# Patient Record
Sex: FEMALE | Race: WHITE | NOT HISPANIC OR LATINO | Employment: FULL TIME | URBAN - METROPOLITAN AREA
[De-identification: names, ages, dates, MRNs, and addresses within clinical notes are randomized per-mention and may not be internally consistent; named-entity substitution may affect disease eponyms.]

---

## 2017-02-13 ENCOUNTER — GYNECOLOGY VISIT (OUTPATIENT)
Dept: OBGYN | Facility: CLINIC | Age: 32
End: 2017-02-13
Payer: OTHER MISCELLANEOUS

## 2017-02-13 DIAGNOSIS — N93.8 DUB (DYSFUNCTIONAL UTERINE BLEEDING): ICD-10-CM

## 2017-02-13 LAB — IN CLINIC OB SCAN: NORMAL

## 2017-02-13 PROCEDURE — 99203 OFFICE O/P NEW LOW 30 MIN: CPT | Mod: 25 | Performed by: OBSTETRICS & GYNECOLOGY

## 2017-02-13 PROCEDURE — 76830 TRANSVAGINAL US NON-OB: CPT | Performed by: OBSTETRICS & GYNECOLOGY

## 2017-02-13 NOTE — MR AVS SNAPSHOT
Murray Chavez   2017 9:00 AM   Gynecology Visit   MRN: [id number]    Department:  [facility]   Dept Phone:  [phone]    Description:  Female : 1985   Provider:  Barbra Ulloa M.D.           Allergies as of 2017     No Known Allergies      You were diagnosed with     DUB (dysfunctional uterine bleeding)   [[id number]]         Vital Signs     Smoking Status                   Never Smoker            Basic Information     Date Of Birth Sex Race Ethnicity Preferred Language    1985 Female White Non- English      Health Maintenance     Patient has no pending health maintenance at this time      Results     POCT US - In Clinic OB Scan                   Current Immunizations     Tuberculin Skin Test 10/29/2013  4:25 PM      Below and/or attached are the medications your provider expects you to take. Review all of your home medications and newly ordered medications with your provider and/or pharmacist. Follow medication instructions as directed by your provider and/or pharmacist. Please keep your medication list with you and share with your provider. Update the information when medications are discontinued, doses are changed, or new medications (including over-the-counter products) are added; and carry medication information at all times in the event of emergency situations     Allergies:  No Known Allergies          Medications  Valid as of: 2017 -  9:44 AM    Generic Name Brand Name Tablet Size Instructions for use    Prenatal MV-Min-Fe Fum-FA-DHA   Take  by mouth.        .                 Medicines prescribed today were sent to:     [facility]/PHARMACY #[id number] - [place] - [address] [address]    Phone: [phone] Fax: [phone]    Open 24 Hours?: No      Medication refill instructions:       If your prescription bottle indicates you have medication refills left, it is not necessary to call your provider’s office.  Please contact your pharmacy and they will refill your medication.    If your prescription bottle indicates you do not have any refills left, you may request refills at any time through one of the following ways: The online [employer] system (except Urgent Care), by calling your provider’s office, or by asking your pharmacy to contact your provider’s office with a refill request. Medication refills are processed only during regular business hours and may not be available until the next business day. Your provider may request additional information or to have a follow-up visit with you prior to refilling your medication.   *Please Note: Medication refills are assigned a new Rx number when refilled electronically. Your pharmacy may indicate that no refills were authorized even though a new prescription for the same medication is available at the pharmacy. Please request the medicine by name with the pharmacy before contacting your provider for a refill.           [employer] Access Code: Activation code not generated  Current [employer] Status: Active

## 2017-02-13 NOTE — PROGRESS NOTES
CC: Confirmation of Pregnancy    HPI: Pt is a 32 yo  lmp 12/3/16 (irregular cycle)  who presents for evaluation of amenorrhea.  She has had no bleeding since her last menses.  A urine pregnancy test was positive.  She denies vaginal spotting or pain.  She notes nausea and vomiting.      ROS: negative for dizziness, SOB, chest pain, palpitations, dysuria, vaginal discharge.    There were no vitals taken for this visit.    GENERAL: Alert, in no apparent distress  PSYCHIATRIC: Appropriate affect, intact insight and judgement.  ABDOMEN: Soft, nontender, nondistended.  No palpable masses. No hepatosplenomegaly.   No rebound or guarding.  No inguinal lymphadenopathy.  BACK: No CVA tenderness  EXTREMITIES: No edema, no calf tenderness.    GENITOURINARY:  Normal external genitalia, no lesions.  Normal urethral meatus, no masses or tenderness.  Normal bladder without fullness or masses.  Vagina well estrogenized, no vaginal discharge or lesions.  Cervix normal length, nontender.  Uterus normal size, shape, and contour, nontender.  Adnexa nontender, no masses.  Normal anus and perineum.      TRANSVAGINAL ULTRASOUND - performed and interpreted by me    Clement intrauterine pregnancy with CRL measuring 38.1 mm, c/w 11 +1/7 weeks EGA, positive fetal cardiac activity noted. EDC 9/3/17     No fluid in cul de sac.    Ovaries and cervix appear grossly normal.  Cervix 3.8 cm.    ASSESSMENT/PLAN:  1. DUB visit - Clement IUP at 11+1/7  wks.  Prenatal Vitamins.  F/U for NOB appointment. EDC  Will be 9/3/17, as LMP irregular.   2. Nausea and vomiting of pregnancy - Declegis samples given.

## 2017-03-07 ENCOUNTER — TELEPHONE (OUTPATIENT)
Dept: OBGYN | Facility: CLINIC | Age: 32
End: 2017-03-07

## 2017-03-07 NOTE — TELEPHONE ENCOUNTER
----- Message from Your Healthcare Team sent at 3/6/2017  7:36 PM PST -----  Regarding: Non-Urgent Medical Question  Contact: [phone]  I have been having some very strong painful cramps all over my stomach. At first it started as left sided pain and pelvis pressure. Its been going on since friday. It is not constant, but it takes my breath away. too early for those supriya hick,  I cannot say I have had these types of pains before. Still having some issues with bloating, and loose stool Nirali just learned to live with. I am not spotting at all, just hoping it my body arranging for the baby. This was unlike either 2 pregnancies.

## 2017-03-07 NOTE — TELEPHONE ENCOUNTER
Patient called back I asked her symptoms today and she is not having any as of today, but I stated if you begin to feel like this again to go to [facility] to be evaluated. Pt states this was her determining day as to if she was going to go to the hospital, but so far so good. i told the pt she may need to be monitored or give IV fluid so please do not wait around to see if she cn get in to the office when going through that much pain. Dr. Ulloa was on call yesterday 3/6/17 but pt did not return the cll until today. Pt verbalized understanding and had no further questions

## 2017-03-17 ENCOUNTER — HOSPITAL ENCOUNTER (OUTPATIENT)
Facility: MEDICAL CENTER | Age: 32
End: 2017-03-17
Attending: OBSTETRICS & GYNECOLOGY
Payer: COMMERCIAL

## 2017-03-17 ENCOUNTER — TELEPHONE (OUTPATIENT)
Dept: OBGYN | Facility: CLINIC | Age: 32
End: 2017-03-17

## 2017-03-17 ENCOUNTER — INITIAL PRENATAL (OUTPATIENT)
Dept: OBGYN | Facility: CLINIC | Age: 32
End: 2017-03-17
Payer: COMMERCIAL

## 2017-03-17 VITALS — SYSTOLIC BLOOD PRESSURE: 122 MMHG | WEIGHT: 223 LBS | DIASTOLIC BLOOD PRESSURE: 82 MMHG

## 2017-03-17 DIAGNOSIS — Z34.82 ENCOUNTER FOR SUPERVISION OF OTHER NORMAL PREGNANCY IN SECOND TRIMESTER: ICD-10-CM

## 2017-03-17 PROCEDURE — 59401 PR NEW OB VISIT: CPT | Performed by: OBSTETRICS & GYNECOLOGY

## 2017-03-17 PROCEDURE — 88175 CYTOPATH C/V AUTO FLUID REDO: CPT

## 2017-03-17 PROCEDURE — 87491 CHLMYD TRACH DNA AMP PROBE: CPT

## 2017-03-17 PROCEDURE — 87591 N.GONORRHOEAE DNA AMP PROB: CPT

## 2017-03-17 NOTE — TELEPHONE ENCOUNTER
"Genevieve called from lab said that the Thinprep Vial was not closed all the way and leaked all over the bag. Going to attempt to run the pap smear but may need possible recollect.   Genevieve faxed over form stating that the pap is ours and it does say \"Murray Chavez\" and has her \"MRN\"    "

## 2017-03-17 NOTE — PROGRESS NOTES
Subjective:      Murray Chavez is a 31 y.o. female who presents with No chief complaint on file.        CC: NOB exam    HPI 32 yo  @ 15+5/7 wks, ARANZA 17 by 11 wk sono presents for NOB exam.  Nausea and vomiting resolved.  Having some migraines - stopped taking Gabapentin and Zoloft.     complications:  1. Migraines  2. Hx of gestational htn    ROS REVIEW OF SYSTEMS:    Pertinent positives and negatives mentioned in HPI.    All other systems reviewed and are negative.     Objective:     /82 mmHg  Wt 101.152 kg (223 lb)     Physical Exam     See prenatal physical.        Assessment/Plan:     1. Encounter for supervision of other normal pregnancy in second trimester  Continue PNV.  Declines CF.   - PRENATAL PANEL 3+HIV+UACXI; Future  - THINPREP RFLX HPV ASCUS W/CTNG; Future  - AFP TETRA; Future  - US-OB 2ND 3RD TRI COMPLETE; Future    F/U 4 wks.

## 2017-03-17 NOTE — MR AVS SNAPSHOT
Murray Chavez   3/17/2017 8:45 AM   Initial Prenatal   MRN: [id number]    Department:  [facility]   Dept Phone:  [phone]    Description:  Female : 1985   Provider:  Barbra Ulloa M.D.           Allergies as of 3/17/2017     No Known Allergies      You were diagnosed with     Encounter for supervision of other normal pregnancy in second trimester   [[id number]]         Vital Signs     Blood Pressure Weight Smoking Status             122/82 mmHg 101.152 kg (223 lb) Never Smoker          Basic Information     Date Of Birth Sex Race Ethnicity Preferred Language    1985 Female White Non- English      Your appointments     May 08, 2017 10:45 AM   OB Follow Up with Barbra Ulloa M.D.   [facility] ([address])    [address] [place] [address], [facility] [address]  [facility] [id number] [phone]            2017  8:30 AM   OB Follow Up with Barbra Ulloa M.D.   [facility] ([address])    [address] [place] [address], [address] [id number] [phone]              Health Maintenance        Date Due Completion Dates    IMM DTaP/Tdap/Td Vaccine (1 - Tdap) 2006 ---    PAP SMEAR 2008 ---    IMM INFLUENZA (1) 2016 ---            Current Immunizations     Tuberculin Skin Test 10/29/2013  4:25 PM      Below and/or attached are the medications your provider expects you to take. Review all of your home medications and newly ordered medications with your provider and/or pharmacist. Follow medication instructions as directed by your provider and/or pharmacist. Please keep your medication list with you and share with your provider. Update the information when medications are discontinued, doses are changed, or new medications (including over-the-counter products) are added; and carry medication information at all times in the event of emergency situations     Allergies:  No Known Allergies          Medications  Valid as of: March  17, 2017 -  9:08 AM    Generic Name Brand Name Tablet Size Instructions for use    Prenatal MV-Min-Fe Fum-FA-DHA   Take  by mouth.        .                 Medicines prescribed today were sent to:     [facility]/PHARMACY #[id number] - [place], [place] - [address] [facility]    [address] [facility] [place] [address]    Phone: [phone] Fax: [phone]    Open 24 Hours?: No      Medication refill instructions:       If your prescription bottle indicates you have medication refills left, it is not necessary to call your provider’s office. Please contact your pharmacy and they will refill your medication.    If your prescription bottle indicates you do not have any refills left, you may request refills at any time through one of the following ways: The online [employer] system (except Urgent Care), by calling your provider’s office, or by asking your pharmacy to contact your provider’s office with a refill request. Medication refills are processed only during regular business hours and may not be available until the next business day. Your provider may request additional information or to have a follow-up visit with you prior to refilling your medication.   *Please Note: Medication refills are assigned a new Rx number when refilled electronically. Your pharmacy may indicate that no refills were authorized even though a new prescription for the same medication is available at the pharmacy. Please request the medicine by name with the pharmacy before contacting your provider for a refill.        Your To Do List     Future Labs/Procedures Complete By Expires    AFP TETRA  As directed 3/17/2018    PRENATAL PANEL 3+HIV+UACXI  As directed 3/17/2018    THINPREP RFLX HPV ASCUS W/CTNG  As directed 3/17/2018    US-OB 2ND 3RD TRI COMPLETE  As directed 3/17/2018         [employer] Access Code: Activation code not generated  Current [employer] Status: Active

## 2017-03-20 DIAGNOSIS — Z34.82 ENCOUNTER FOR SUPERVISION OF OTHER NORMAL PREGNANCY IN SECOND TRIMESTER: ICD-10-CM

## 2017-03-22 LAB
C TRACH DNA GENITAL QL NAA+PROBE: NEGATIVE
CYTOLOGY REG CYTOL: NORMAL
N GONORRHOEA DNA GENITAL QL NAA+PROBE: NEGATIVE
SPECIMEN SOURCE: NORMAL

## 2017-04-03 ENCOUNTER — TELEPHONE (OUTPATIENT)
Dept: OBGYN | Facility: CLINIC | Age: 32
End: 2017-04-03

## 2017-04-05 NOTE — TELEPHONE ENCOUNTER
Consulted with Dr funk he advised pt to go to Urgent care to get evlauated   Pt has been drinking 8-10 cups a day or more of water  BP: 04/04/17 - 120/84  BP: 04/05/17 - 138/78  Heartrate: 04/05/17 - 140    Pt verbalized understanding about going to get evaluated

## 2017-04-06 LAB
BUN SERPL-MCNC: 7 MG/DL
CREAT SERPL-MCNC: 0.6 MG/DL
HBV SURFACE AG SERPL QL IA: NON REACTIVE
HCT VFR BLD AUTO: 36.1 %
HGB BLD-MCNC: 12.5 G/DL
HIV 1 0 2 IC ZHVIC: NON REACTIVE
PLATELET # BLD AUTO: 303 10*3/UL
RUBV IGG SERPL IA-ACNC: NORMAL

## 2017-04-07 ENCOUNTER — HOSPITAL ENCOUNTER (OUTPATIENT)
Dept: RADIOLOGY | Facility: MEDICAL CENTER | Age: 32
End: 2017-04-07
Attending: OBSTETRICS & GYNECOLOGY
Payer: COMMERCIAL

## 2017-04-07 DIAGNOSIS — Z34.82 ENCOUNTER FOR SUPERVISION OF OTHER NORMAL PREGNANCY IN SECOND TRIMESTER: ICD-10-CM

## 2017-04-07 PROCEDURE — 76805 OB US >/= 14 WKS SNGL FETUS: CPT

## 2017-04-09 DIAGNOSIS — Z34.82 ENCOUNTER FOR SUPERVISION OF OTHER NORMAL PREGNANCY IN SECOND TRIMESTER: ICD-10-CM

## 2017-04-10 ENCOUNTER — TELEPHONE (OUTPATIENT)
Dept: OBGYN | Facility: CLINIC | Age: 32
End: 2017-04-10

## 2017-04-10 NOTE — TELEPHONE ENCOUNTER
----- Message from Barbra Ulloa M.D. sent at 4/9/2017  9:15 AM PDT -----  Please call patient and inform her that the US looks good, but they were not able to see everything.  I have ordered a repeat US to be done in 2-3 weeks to complete the fetal survey.

## 2017-04-11 ENCOUNTER — ROUTINE PRENATAL (OUTPATIENT)
Dept: OBGYN | Facility: CLINIC | Age: 32
End: 2017-04-11
Payer: COMMERCIAL

## 2017-04-11 VITALS — DIASTOLIC BLOOD PRESSURE: 60 MMHG | SYSTOLIC BLOOD PRESSURE: 98 MMHG | WEIGHT: 226 LBS

## 2017-04-11 DIAGNOSIS — R00.2 PALPITATIONS: ICD-10-CM

## 2017-04-11 DIAGNOSIS — O13.2 GESTATIONAL HYPERTENSION WITHOUT SIGNIFICANT PROTEINURIA IN SECOND TRIMESTER: ICD-10-CM

## 2017-04-11 PROBLEM — O13.9 GESTATIONAL HYPERTENSION WITHOUT SIGNIFICANT PROTEINURIA: Status: ACTIVE | Noted: 2017-04-11

## 2017-04-11 PROBLEM — G43.909 MIGRAINES: Status: ACTIVE | Noted: 2017-04-11

## 2017-04-11 PROCEDURE — 90040 PR PRENATAL FOLLOW UP: CPT | Performed by: OBSTETRICS & GYNECOLOGY

## 2017-04-11 NOTE — Clinical Note
April 11, 2017       Patient: Murray Chavez   YOB: 1985   Date of Visit: 4/11/2017         To Whom It May Concern:    It is my medical opinion that Murray Chavez must ambulate every 2 hours when traveling.    If you have any questions or concerns, please don't hesitate to call [phone]          Sincerely,          Barbra Ulloa M.D.  Electronically Signed

## 2017-04-11 NOTE — PROGRESS NOTES
31 y.o.  19w2d The patient is here for routine obstetrical followup.She denies contractions, vaginal bleeding, or leaking of fluid.  She c/o intermittent palpitations with pulse in 130s-140s.  Also notes sporadic elevation in BP at work.      She was seen at urgent care and started on propranolol and hctz for /70.  Now BP is 98/60.  I have advised her to stop these medications immediately. Plan to take BP at work daily, and f/u in office for BP check in one week.  If BPs 150s/90s-100s, will consider starting labetalol.      Referal submitted to cardiology for palpitations and intermittent tachycardia.    Fetal survey reviewed - incomplete.  Repeat study ordered 2-3 weeks.  Prenatal labs reviewed.     Declines AFP and CF testing.     Followup in 1 week

## 2017-04-11 NOTE — PROGRESS NOTES
Pt here for OB F/V. Good fetal movement. Denies LOF, VB.  PAP WNL  PNP done at PCP - pt bring in copy today  Decided to not get AFP done  Need US follow up- order slip given  PCP put her on BP meds- brings them with her today

## 2017-04-11 NOTE — MR AVS SNAPSHOT
Murray Chavez   2017 10:15 AM   Routine Prenatal   MRN: [id number]    Department:  [facility]   Dept Phone:  [phone]    Description:  Female : 1985   Provider:  Barbra Ulloa M.D.           Allergies as of 2017     No Known Allergies      Vital Signs     Blood Pressure Weight Smoking Status             98/60 mmHg 102.513 kg (226 lb) Never Smoker          Basic Information     Date Of Birth Sex Race Ethnicity Preferred Language    1985 Female White Non- English      Your appointments     2017  1:15 PM   OB Follow Up with Barbra Ulloa M.D.   [facility] ([address])    [address] [place] [address], [address] [place] [id number] [phone]            2017  8:45 AM   OB Follow Up with Barbra Ulloa M.D.   [facility] ([address])    [address] [place] [address], [address] [place] [phone]            2017  8:45 AM   OB Follow Up with Barbra Ulloa M.D.   [facility] ([address])    [address] [place] [address], [address] [place] [phone]              Health Maintenance        Date Due Completion Dates    IMM DTaP/Tdap/Td Vaccine (1 - Tdap) 2006 ---    PAP SMEAR 3/17/2020 3/17/2017            Current Immunizations     Tuberculin Skin Test 10/29/2013  4:25 PM      Below and/or attached are the medications your provider expects you to take. Review all of your home medications and newly ordered medications with your provider and/or pharmacist. Follow medication instructions as directed by your provider and/or pharmacist. Please keep your medication list with you and share with your provider. Update the information when medications are discontinued, doses are changed, or new medications (including over-the-counter products) are added; and carry medication information at all times in the event of emergency situations     Allergies:  No Known Allergies            Medications  Valid as of: April 11, 2017 - 10:48 AM    Generic Name Brand Name Tablet Size Instructions for use    Prenatal MV-Min-Fe Fum-FA-DHA   Take  by mouth.        .                 Medicines prescribed today were sent to:     [facility]/PHARMACY #[id number] - [place], [place] - [address] [facility]    [address] [facility] [place] [address]    Phone: [phone] Fax: [phone]    Open 24 Hours?: No      Medication refill instructions:       If your prescription bottle indicates you have medication refills left, it is not necessary to call your provider’s office. Please contact your pharmacy and they will refill your medication.    If your prescription bottle indicates you do not have any refills left, you may request refills at any time through one of the following ways: The online [employer] system (except Urgent Care), by calling your provider’s office, or by asking your pharmacy to contact your provider’s office with a refill request. Medication refills are processed only during regular business hours and may not be available until the next business day. Your provider may request additional information or to have a follow-up visit with you prior to refilling your medication.   *Please Note: Medication refills are assigned a new Rx number when refilled electronically. Your pharmacy may indicate that no refills were authorized even though a new prescription for the same medication is available at the pharmacy. Please request the medicine by name with the pharmacy before contacting your provider for a refill.           [employer] Access Code: Activation code not generated  Current [employer] Status: Active

## 2017-04-19 ENCOUNTER — ROUTINE PRENATAL (OUTPATIENT)
Dept: OBGYN | Facility: CLINIC | Age: 32
End: 2017-04-19
Payer: COMMERCIAL

## 2017-04-19 VITALS — SYSTOLIC BLOOD PRESSURE: 138 MMHG | WEIGHT: 226 LBS | DIASTOLIC BLOOD PRESSURE: 76 MMHG

## 2017-04-19 DIAGNOSIS — G43.919 INTRACTABLE MIGRAINE WITHOUT STATUS MIGRAINOSUS, UNSPECIFIED MIGRAINE TYPE: ICD-10-CM

## 2017-04-19 PROCEDURE — 90040 PR PRENATAL FOLLOW UP: CPT | Performed by: OBSTETRICS & GYNECOLOGY

## 2017-04-19 RX ORDER — BUTALBITAL, ACETAMINOPHEN AND CAFFEINE 50; 325; 40 MG/1; MG/1; MG/1
1 TABLET ORAL EVERY 4 HOURS PRN
Qty: 10 TAB | Refills: 0 | Status: ON HOLD | OUTPATIENT
Start: 2017-04-19 | End: 2017-08-31

## 2017-04-19 NOTE — MR AVS SNAPSHOT
Murray Chavez   2017 1:15 PM   Routine Prenatal   MRN: [id number]    Department[facility]   Dept Phone:  [phone]    Description:  Female : 1985   Provider:  Barbra Ulloa M.D.           Allergies as of 2017     No Known Allergies      You were diagnosed with     Intractable migraine without status migrainosus, unspecified migraine type   [[id number]]         Vital Signs     Blood Pressure Weight Smoking Status             138/76 mmHg 102.513 kg (226 lb) Never Smoker          Basic Information     Date Of Birth Sex Race Ethnicity Preferred Language    1985 Female White Non- English      Your appointments     2017  3:30 PM   US PREG 30 with [facility] US 1   [facility] ([place])    [address] [address]  [facility] [phone]           For [facility] patients only: 1. Please arrive 15 min prior to your appointment time. 2. If you're late, you will be rescheduled for the next available appointment. 3. If you need to reschedule your appointment, please call us at [phone] 48 hours prior to your appointment. 4. Do not bring children as they will not be allowed in exam room. 5. Only one family member may be present in room during exam. 6. The exam will be 30-60 minutes depending on the exam ordered by the physician. 7. The sonographer is not allowed to discuss findings during the exam. Your provider will go over the results with you at your next appointment. 8. The purpose of this ultrasound is to determine if baby is healthy. Diagnostic ultrasounds are NOT to determine the gender of the baby. 9. NO photography or video recording is allowed in exam room. 10. NO cell phones allowed in the exam room. INFORMACION SOBRE CAMPOS ULTRASONIDO 1. Por favor de llegar 15 minutos antes de campos lobo. 2. Si llega tarde, le tenemos que cambiar la lobo para otra fecha. 3. Si necesita cambiar campos lobo, por favor llame 48 horas antes de la  lobo. [phone] 4. Por favor no traer niños. No se permiten en cuarto de Ultrasonido. 5. Solamente se permite xochitl persona en el cuarto danyel el examen. 6. El examen dura 30-60 minutos, dependiendo del examen ordenado por el Doctor. 7. El Sonógrafo no está autorizado hablar sobre burger examen. Burger doctor o partera le va explicar los resultados en burger próxima lobo. 8. El propósito del Ultrasonido es para determinar si burger benita viene saludable. No es para determinar el sexo de burger benita. 9. Por favor no fotos o cámaras de grabar. 10. No celulares permitidos en el cuarto de examen.            Jun 01, 2017  8:45 AM   OB Follow Up with Barbra Ulloa M.D.   [facility] [address] [address]   [phone]              Problem List              ICD-10-CM Priority Class Noted - Resolved    Migraines G43.909   4/11/2017 - Present    Gestational hypertension without significant proteinuria O13.9   4/11/2017 - Present      Health Maintenance        Date Due Completion Dates    IMM DTaP/Tdap/Td Vaccine (1 - Tdap) 12/25/2006 ---    PAP SMEAR 3/17/2020 3/17/2017            Current Immunizations     Tuberculin Skin Test 10/29/2013  4:25 PM      Below and/or attached are the medications your provider expects you to take. Review all of your home medications and newly ordered medications with your provider and/or pharmacist. Follow medication instructions as directed by your provider and/or pharmacist. Please keep your medication list with you and share with your provider. Update the information when medications are discontinued, doses are changed, or new medications (including over-the-counter products) are added; and carry medication information at all times in the event of emergency situations     Allergies:  No Known Allergies          Medications  Valid as of: April 19, 2017 -  2:25 PM    Generic Name Brand Name Tablet Size Instructions for use     Butalbital-APAP-Caffeine (Tab) FIORICET -40 MG Take 1 Tab by mouth every four hours as needed for Headache.        Prenatal MV-Min-Fe Fum-FA-DHA   Take  by mouth.        .                 Medicines prescribed today were sent to:     [facility]/PHARMACY #[id number] - [place], [place] - [address] [facility]    [address] [facility] [place] [address]    Phone: [phone] Fax: [phone]    Open 24 Hours?: No      Medication refill instructions:       If your prescription bottle indicates you have medication refills left, it is not necessary to call your provider’s office. Please contact your pharmacy and they will refill your medication.    If your prescription bottle indicates you do not have any refills left, you may request refills at any time through one of the following ways: The online [employer] system (except Urgent Care), by calling your provider’s office, or by asking your pharmacy to contact your provider’s office with a refill request. Medication refills are processed only during regular business hours and may not be available until the next business day. Your provider may request additional information or to have a follow-up visit with you prior to refilling your medication.   *Please Note: Medication refills are assigned a new Rx number when refilled electronically. Your pharmacy may indicate that no refills were authorized even though a new prescription for the same medication is available at the pharmacy. Please request the medicine by name with the pharmacy before contacting your provider for a refill.           [employer] Access Code: Activation code not generated  Current [employer] Status: Active

## 2017-04-19 NOTE — Clinical Note
April 19, 2017       Patient: Murray Chavez   YOB: 1985   Date of Visit: 4/19/2017         To Whom It May Concern:  Murray Chavez to be off Monday thru Thursday (04/17-04/20/17) this week due to complications of pregnancy; returning on Friday 04/21/2017    If you have any questions or concerns, please don't hesitate to call [phone]          Sincerely,          Barbra Ulloa M.D.  Electronically Signed

## 2017-04-25 ENCOUNTER — ROUTINE PRENATAL (OUTPATIENT)
Dept: OBGYN | Facility: CLINIC | Age: 32
End: 2017-04-25
Payer: COMMERCIAL

## 2017-04-25 VITALS — DIASTOLIC BLOOD PRESSURE: 72 MMHG | WEIGHT: 228 LBS | SYSTOLIC BLOOD PRESSURE: 120 MMHG

## 2017-04-25 DIAGNOSIS — O09.92 SUPERVISION OF HIGH-RISK PREGNANCY, SECOND TRIMESTER: ICD-10-CM

## 2017-04-25 PROCEDURE — 90040 PR PRENATAL FOLLOW UP: CPT | Performed by: OBSTETRICS & GYNECOLOGY

## 2017-04-25 NOTE — MR AVS SNAPSHOT
Murray Chavez   2017 8:45 AM   Routine Prenatal   MRN: [id number]    Department:  [facility]   Dept Phone:  [phone]    Description:  Female : 1985   Provider:  Barbra Ulloa M.D.           Allergies as of 2017     No Known Allergies      Vital Signs     Blood Pressure Weight Smoking Status             120/72 mmHg 103.42 kg (228 lb) Never Smoker          Basic Information     Date Of Birth Sex Race Ethnicity Preferred Language    1985 Female White Non- English      Your appointments     2017  3:30 PM   US PREG 30 with [facility] US 1   [facility]AGING [place] ([place])    [address] [place] [address]  [facility] [address] [phone]           For [facility] patients only: 1. Please arrive 15 min prior to your appointment time. 2. If you're late, you will be rescheduled for the next available appointment. 3. If you need to reschedule your appointment, please call us at [phone] 48 hours prior to your appointment. 4. Do not bring children as they will not be allowed in exam room. 5. Only one family member may be present in room during exam. 6. The exam will be 30-60 minutes depending on the exam ordered by the physician. 7. The sonographer is not allowed to discuss findings during the exam. Your provider will go over the results with you at your next appointment. 8. The purpose of this ultrasound is to determine if baby is healthy. Diagnostic ultrasounds are NOT to determine the gender of the baby. 9. NO photography or video recording is allowed in exam room. 10. NO cell phones allowed in the exam room. INFORMACION SOBRE CAMPOS ULTRASONIDO 1. Por favor de llegar 15 minutos antes de campos lobo. 2. Si llega tarde, le tenemos que cambiar la lobo para otra fecha. 3. Si necesita cambiar campos lobo, por favor llame 48 horas antes de la lobo. [phone] 4. Por favor no traer niños. No se permiten en cuarto de Ultrasonido. 5. Solamente se permite xochitl persona  en el cuarto danyel el examen. 6. El examen dura 30-60 minutos, dependiendo del examen ordenado por el Doctor. 7. El Sonógrafo no está autorizado hablar sobre burger examen. Burger doctor o partera le va explicar los resultados en burger próxima lobo. 8. El propósito del Ultrasonido es para determinar si burger benita viene saludable. No es para determinar el sexo de burger benita. 9. Por favor no fotos o cámaras de grabar. 10. No celulares permitidos en el cuarto de examen.            Jun 01, 2017  8:45 AM   OB Follow Up with Barbra Ulloa M.D.   [facility] [address], [address]  [facility] [address]   [phone]              Problem List              ICD-10-CM Priority Class Noted - Resolved    Migraines G43.909   4/11/2017 - Present    Gestational hypertension without significant proteinuria O13.9   4/11/2017 - Present      Health Maintenance        Date Due Completion Dates    IMM DTaP/Tdap/Td Vaccine (1 - Tdap) 12/25/2006 ---    PAP SMEAR 3/17/2020 3/17/2017            Current Immunizations     Tuberculin Skin Test 10/29/2013  4:25 PM      Below and/or attached are the medications your provider expects you to take. Review all of your home medications and newly ordered medications with your provider and/or pharmacist. Follow medication instructions as directed by your provider and/or pharmacist. Please keep your medication list with you and share with your provider. Update the information when medications are discontinued, doses are changed, or new medications (including over-the-counter products) are added; and carry medication information at all times in the event of emergency situations     Allergies:  No Known Allergies          Medications  Valid as of: April 25, 2017 -  9:37 AM    Generic Name Brand Name Tablet Size Instructions for use    Butalbital-APAP-Caffeine (Tab) FIORICET -40 MG Take 1 Tab by mouth every four hours as needed for Headache.        Prenatal MV-Min-Fe  Fum-FA-DHA   Take  by mouth.        .                 Medicines prescribed today were sent to:     [facility]ARMACY #[id number] - [place], [place] - [address]    [address] [place] [address]    Phone: [phone] Fax: [phone]    Open 24 Hours?: No      Medication refill instructions:       If your prescription bottle indicates you have medication refills left, it is not necessary to call your provider’s office. Please contact your pharmacy and they will refill your medication.    If your prescription bottle indicates you do not have any refills left, you may request refills at any time through one of the following ways: The online Enevate system (except Urgent Care), by calling your provider’s office, or by asking your pharmacy to contact your provider’s office with a refill request. Medication refills are processed only during regular business hours and may not be available until the next business day. Your provider may request additional information or to have a follow-up visit with you prior to refilling your medication.   *Please Note: Medication refills are assigned a new Rx number when refilled electronically. Your pharmacy may indicate that no refills were authorized even though a new prescription for the same medication is available at the pharmacy. Please request the medicine by name with the pharmacy before contacting your provider for a refill.           Enevate Access Code: Activation code not generated  Current Enevate Status: Active

## 2017-04-25 NOTE — PROGRESS NOTES
31 y.o.  21w2d The patient is here for routine obstetrical followup. She reports good fetal movement. She denies contractions, vaginal bleeding, or leaking of fluid.  Continues with migraines, but they are less frequent.  Palpitations have improved and she is feeling much better.     The patient's pregnancy is complicated by   Patient Active Problem List    Diagnosis Date Noted   • Migraines 2017   • Gestational hypertension without significant proteinuria 2017     Declines quad screen.  Fetal survey scheduled this week.      Followup in 4 weeks

## 2017-04-27 ENCOUNTER — HOSPITAL ENCOUNTER (OUTPATIENT)
Dept: RADIOLOGY | Facility: MEDICAL CENTER | Age: 32
End: 2017-04-27
Attending: OBSTETRICS & GYNECOLOGY
Payer: COMMERCIAL

## 2017-04-27 DIAGNOSIS — Z34.82 ENCOUNTER FOR SUPERVISION OF OTHER NORMAL PREGNANCY IN SECOND TRIMESTER: ICD-10-CM

## 2017-04-27 PROCEDURE — 76815 OB US LIMITED FETUS(S): CPT

## 2017-05-01 NOTE — PROGRESS NOTES
31 y.o.  22w0d The patient is here for routine obstetrical followup. She reports good fetal movement. She denies contractions, vaginal bleeding, or leaking of fluid.  She has multiple complaints today.  C/O a migraine for 4 days, not relieved by tylenol.  Also c/o palpitations - had a holter at her place of work which showed occasional ventricular ectopy.  Also feeling tired, dizzy, and bloated and stressed from work.     The patient's pregnancy is complicated by   Patient Active Problem List    Diagnosis Date Noted   • Migraines 2017   • Gestational hypertension without significant proteinuria 2017     Recommend increasing hydration, one cup of strong coffee, and fioricet prn headache.  If symptoms worsen, consider neurology referral.    Has f/u with cardiologist scheduled for PVCs.  BPs normal off meds.     Note given for work.    Followup in 1 week

## 2017-05-23 ENCOUNTER — TELEPHONE (OUTPATIENT)
Dept: OBGYN | Facility: CLINIC | Age: 32
End: 2017-05-23

## 2017-05-23 DIAGNOSIS — Z34.93 NORMAL PREGNANCY IN THIRD TRIMESTER: ICD-10-CM

## 2017-06-01 ENCOUNTER — ROUTINE PRENATAL (OUTPATIENT)
Dept: OBGYN | Facility: CLINIC | Age: 32
End: 2017-06-01
Payer: COMMERCIAL

## 2017-06-01 VITALS — SYSTOLIC BLOOD PRESSURE: 104 MMHG | DIASTOLIC BLOOD PRESSURE: 62 MMHG | WEIGHT: 230 LBS

## 2017-06-01 DIAGNOSIS — K21.9 GASTROESOPHAGEAL REFLUX DISEASE, ESOPHAGITIS PRESENCE NOT SPECIFIED: ICD-10-CM

## 2017-06-01 PROCEDURE — 90040 PR PRENATAL FOLLOW UP: CPT | Performed by: OBSTETRICS & GYNECOLOGY

## 2017-06-01 RX ORDER — OMEPRAZOLE 20 MG/1
20 CAPSULE, DELAYED RELEASE ORAL DAILY
Qty: 30 CAP | Refills: 4 | Status: ON HOLD | OUTPATIENT
Start: 2017-06-01 | End: 2017-10-09

## 2017-06-01 NOTE — PROGRESS NOTES
Pt here for OB F/V. Good fetal movement. Denies LOF, VB.  CO acid reflux- otc not helping anymore  CO stomach cramps and swelling  28 week labs not done- will reprint lab slips

## 2017-06-01 NOTE — MR AVS SNAPSHOT
Murray Chavez   2017 8:45 AM   Routine Prenatal   MRN: [id number]    Department:  [facility]   Dept Phone:  [phone]    Description:  Female : 1985   Provider:  Barbra Ulloa M.D.           Allergies as of 2017     No Known Allergies      Vital Signs     Blood Pressure Weight Smoking Status             104/62 mmHg 104.327 kg (230 lb) Never Smoker          Basic Information     Date Of Birth Sex Race Ethnicity Preferred Language    1985 Female White Non- English      Your appointments     Jul 10, 2017  9:30 AM   OB Follow Up with Barbra Ulloa M.D.   [facility] ([address])    [address] [place] [address], [address]  [facility] [id number] [phone]            Aug 07, 2017  1:30 PM   OB Follow Up with Barbra Ulloa M.D.   [facility] ([address])    [address] [place] [address], [address]  [facility] [id number] [phone]            Aug 21, 2017  9:00 AM   OB Follow Up with Barbra Ulloa M.D.   [facility] ([address])    [address] [place] [address], [address]  [facility] [id number] [phone]              Problem List              ICD-10-CM Priority Class Noted - Resolved    Migraines G43.909   2017 - Present    Gestational hypertension without significant proteinuria O13.9   2017 - Present      Health Maintenance        Date Due Completion Dates    IMM DTaP/Tdap/Td Vaccine (1 - Tdap) 2006 ---    PAP SMEAR 3/17/2020 3/17/2017            Current Immunizations     Tuberculin Skin Test 10/29/2013  4:25 PM      Below and/or attached are the medications your provider expects you to take. Review all of your home medications and newly ordered medications with your provider and/or pharmacist. Follow medication instructions as directed by your provider and/or pharmacist. Please keep your medication list with you and share with your provider. Update the information when medications are discontinued,  doses are changed, or new medications (including over-the-counter products) are added; and carry medication information at all times in the event of emergency situations     Allergies:  No Known Allergies          Medications  Valid as of: June 01, 2017 -  9:03 AM    Generic Name Brand Name Tablet Size Instructions for use    Butalbital-APAP-Caffeine (Tab) FIORICET -40 MG Take 1 Tab by mouth every four hours as needed for Headache.        Prenatal MV-Min-Fe Fum-FA-DHA   Take  by mouth.        .                 Medicines prescribed today were sent to:     [facility]/PHARMACY #[id number] - [place], [place] - [address] [facility]    [address] [facility] [place] [address]    Phone: [phone] Fax: [phone]    Open 24 Hours?: No      Medication refill instructions:       If your prescription bottle indicates you have medication refills left, it is not necessary to call your provider’s office. Please contact your pharmacy and they will refill your medication.    If your prescription bottle indicates you do not have any refills left, you may request refills at any time through one of the following ways: The online JoopLoop system (except Urgent Care), by calling your provider’s office, or by asking your pharmacy to contact your provider’s office with a refill request. Medication refills are processed only during regular business hours and may not be available until the next business day. Your provider may request additional information or to have a follow-up visit with you prior to refilling your medication.   *Please Note: Medication refills are assigned a new Rx number when refilled electronically. Your pharmacy may indicate that no refills were authorized even though a new prescription for the same medication is available at the pharmacy. Please request the medicine by name with the pharmacy before contacting your provider for a refill.           JoopLoop Access Code: Activation code not generated  Current JoopLoop Status: Active

## 2017-06-01 NOTE — PROGRESS NOTES
31 y.o.  26w4d The patient is here for routine obstetrical followup. She reports good fetal movement. She denies regular contractions, vaginal bleeding, or leaking of fluid.  C/O severe reflux symptoms unrelieved by pepcid and tums.  Still having intermittent palpitations.  Continues with migraines, manageable with tylenol, prn fioricet, hydration, and rest.     The patient's pregnancy is complicated by   Patient Active Problem List    Diagnosis Date Noted   • Migraines 2017   • Gestational hypertension without significant proteinuria 2017   Trace pedal edema bilaterally.  No calf tenderness.    28 wk labs ordered.  Omeprazole 20 mg daily for reflux.  Reviewed diet.     Followup in 4 weeks   labor precautions were discussed with patient  Fetal kick counts were discussed with patient

## 2017-06-20 ENCOUNTER — HOSPITAL ENCOUNTER (OUTPATIENT)
Facility: MEDICAL CENTER | Age: 32
End: 2017-06-20
Attending: OBSTETRICS & GYNECOLOGY | Admitting: OBSTETRICS & GYNECOLOGY
Payer: COMMERCIAL

## 2017-06-20 ENCOUNTER — TELEPHONE (OUTPATIENT)
Dept: OBGYN | Facility: CLINIC | Age: 32
End: 2017-06-20

## 2017-06-20 VITALS — DIASTOLIC BLOOD PRESSURE: 56 MMHG | SYSTOLIC BLOOD PRESSURE: 118 MMHG | HEART RATE: 91 BPM

## 2017-06-20 LAB
ALBUMIN SERPL BCP-MCNC: 3.3 G/DL (ref 3.2–4.9)
ALBUMIN/GLOB SERPL: 1.1 G/DL
ALP SERPL-CCNC: 82 U/L (ref 30–99)
ALT SERPL-CCNC: 10 U/L (ref 2–50)
ANION GAP SERPL CALC-SCNC: 10 MMOL/L (ref 0–11.9)
APPEARANCE UR: CLEAR
AST SERPL-CCNC: 12 U/L (ref 12–45)
BASOPHILS # BLD AUTO: 0.5 % (ref 0–1.8)
BASOPHILS # BLD: 0.06 K/UL (ref 0–0.12)
BILIRUB SERPL-MCNC: 0.4 MG/DL (ref 0.1–1.5)
BUN SERPL-MCNC: 6 MG/DL (ref 8–22)
CALCIUM SERPL-MCNC: 9.2 MG/DL (ref 8.5–10.5)
CHLORIDE SERPL-SCNC: 109 MMOL/L (ref 96–112)
CO2 SERPL-SCNC: 18 MMOL/L (ref 20–33)
COLOR UR AUTO: YELLOW
CREAT SERPL-MCNC: 0.54 MG/DL (ref 0.5–1.4)
CREAT UR-MCNC: 48.3 MG/DL
EOSINOPHIL # BLD AUTO: 0.06 K/UL (ref 0–0.51)
EOSINOPHIL NFR BLD: 0.5 % (ref 0–6.9)
ERYTHROCYTE [DISTWIDTH] IN BLOOD BY AUTOMATED COUNT: 42.8 FL (ref 35.9–50)
GFR SERPL CREATININE-BSD FRML MDRD: >60 ML/MIN/1.73 M 2
GLOBULIN SER CALC-MCNC: 3 G/DL (ref 1.9–3.5)
GLUCOSE SERPL-MCNC: 109 MG/DL (ref 65–99)
GLUCOSE UR QL STRIP.AUTO: NEGATIVE MG/DL
HCT VFR BLD AUTO: 35.2 % (ref 37–47)
HGB BLD-MCNC: 12 G/DL (ref 12–16)
IMM GRANULOCYTES # BLD AUTO: 0.18 K/UL (ref 0–0.11)
IMM GRANULOCYTES NFR BLD AUTO: 1.4 % (ref 0–0.9)
KETONES UR QL STRIP.AUTO: NEGATIVE MG/DL
LEUKOCYTE ESTERASE UR QL STRIP.AUTO: NEGATIVE
LYMPHOCYTES # BLD AUTO: 2.24 K/UL (ref 1–4.8)
LYMPHOCYTES NFR BLD: 16.9 % (ref 22–41)
MCH RBC QN AUTO: 31.1 PG (ref 27–33)
MCHC RBC AUTO-ENTMCNC: 34.1 G/DL (ref 33.6–35)
MCV RBC AUTO: 91.2 FL (ref 81.4–97.8)
MONOCYTES # BLD AUTO: 0.84 K/UL (ref 0–0.85)
MONOCYTES NFR BLD AUTO: 6.3 % (ref 0–13.4)
NEUTROPHILS # BLD AUTO: 9.88 K/UL (ref 2–7.15)
NEUTROPHILS NFR BLD: 74.4 % (ref 44–72)
NITRITE UR QL STRIP.AUTO: NEGATIVE
NRBC # BLD AUTO: 0 K/UL
NRBC BLD AUTO-RTO: 0 /100 WBC
PH UR STRIP.AUTO: 5.5 [PH]
PLATELET # BLD AUTO: 287 K/UL (ref 164–446)
PMV BLD AUTO: 10.2 FL (ref 9–12.9)
POTASSIUM SERPL-SCNC: 3.6 MMOL/L (ref 3.6–5.5)
PROT SERPL-MCNC: 6.3 G/DL (ref 6–8.2)
PROT UR QL STRIP: NEGATIVE MG/DL
PROT UR-MCNC: 4.2 MG/DL (ref 0–15)
PROT/CREAT UR: 87 MG/G (ref 10–107)
RBC # BLD AUTO: 3.86 M/UL (ref 4.2–5.4)
RBC UR QL AUTO: NEGATIVE
SODIUM SERPL-SCNC: 137 MMOL/L (ref 135–145)
SP GR UR: 1.01
URATE SERPL-MCNC: 4.5 MG/DL (ref 1.9–8.2)
WBC # BLD AUTO: 13.3 K/UL (ref 4.8–10.8)

## 2017-06-20 PROCEDURE — 80053 COMPREHEN METABOLIC PANEL: CPT

## 2017-06-20 PROCEDURE — 84156 ASSAY OF PROTEIN URINE: CPT

## 2017-06-20 PROCEDURE — 84550 ASSAY OF BLOOD/URIC ACID: CPT

## 2017-06-20 PROCEDURE — 81002 URINALYSIS NONAUTO W/O SCOPE: CPT

## 2017-06-20 PROCEDURE — 85025 COMPLETE CBC W/AUTO DIFF WBC: CPT

## 2017-06-20 PROCEDURE — 59025 FETAL NON-STRESS TEST: CPT | Performed by: OBSTETRICS & GYNECOLOGY

## 2017-06-20 PROCEDURE — 36415 COLL VENOUS BLD VENIPUNCTURE: CPT

## 2017-06-20 PROCEDURE — 82570 ASSAY OF URINE CREATININE: CPT

## 2017-06-20 NOTE — PROGRESS NOTES
OB Triage Note        Pt ID:   Murray Chavez is a 31 y.o. 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  She checked her BP and found it to be 150/90.  She came straight in to be evaluated.    OB ROS:  Contractions: occasional   LOF: no   Vag dc: no    Vaginal bleeding: no   Fetal movement:  Yes     Patient Active Problem List    Diagnosis Date Noted   • Migraines 2017   • Gestational hypertension without significant proteinuria 2017       Past Medical History   Diagnosis Date   • Migraine    • Urinary tract infection, site not specified      bladder infections   • HPV in female      DX      No past surgical history on file.  OB History    Para Term  AB SAB TAB Ectopic Multiple Living   3 2 2       2      # Outcome Date GA Lbr Severo/2nd Weight Sex Delivery Anes PTL Lv   3 Current            2 Term 11 40w0d  3.714 kg (8 lb 3 oz) M Vag-Spont None N Y   1 Term 10/06/07 40w0d  2.892 kg (6 lb 6 oz) F Vag-Spont EPI N Y        Social History     Social History   • Marital Status:      Spouse Name: N/A   • Number of Children: N/A   • Years of Education: N/A     Occupational History   • Not on file.     Social History Main Topics   • Smoking status: Never Smoker    • Smokeless tobacco: Not on file   • Alcohol Use: No   • Drug Use: No   • Sexual Activity:     Partners: Male     Other Topics Concern   • Not on file     Social History Narrative     Allergies: Review of patient's allergies indicates no known allergies.  No current facility-administered medications for this encounter.        Objective:      Blood pressure 116/58, pulse 90.    General:   no acute distress, alert   Skin:   normal   :     Lungs:   CTA bilateral   Heart:  Regular in rate.   Abdomen:   gravid, NT   EFW:  ND   Pelvis:  adequate with gynecoid  pelvis   NST: Baseline: 135 BPM  Variability:  moderate  Accels:  present  Decels:  absent  Tocometry: contractions rarely present   Uterine Size: S=D   Presentations: Unsure   Cervix:     Dilation: Closed    Effacement: Long    Station:  Floating    Consistency: Firm    Position: Middle     Lab Review  PIH profile: normal    Recent Results (from the past 5880 hour(s))   POCT US - In Clinic OB Scan    Collection Time: 02/13/17  9:43 AM   Result Value Ref Range    In Clinic OB Scan     THINPREP RFLX HPV ASCUS W/CTNG    Collection Time: 03/17/17  9:07 AM   Result Value Ref Range    Cytology Reg See Path Report     Source Cervical     C. trachomatis by PCR Negative Negative    N. gonorrhoeae by PCR Negative Negative   BUN    Collection Time: 04/06/17 12:00 AM   Result Value Ref Range    Bun 7    CREATININE    Collection Time: 04/06/17 12:00 AM   Result Value Ref Range    Creatinine 0.6    PLATELET COUNT    Collection Time: 04/06/17 12:00 AM   Result Value Ref Range    Platelet Count 303    RUBELLA ABS IGG    Collection Time: 04/06/17 12:00 AM   Result Value Ref Range    Rubella IgG Antibody IMMUNE    HCT    Collection Time: 04/06/17 12:00 AM   Result Value Ref Range    Hematocrit 36.1    HGB    Collection Time: 04/06/17 12:00 AM   Result Value Ref Range    Hemoglobin 12.5    HIV ANTIBODIES    Collection Time: 04/06/17 12:00 AM   Result Value Ref Range    HIV 1,0,2 IC NON REACTIVE    HEP B SURFACE ANTIGEN    Collection Time: 04/06/17 12:00 AM   Result Value Ref Range    Hepatitis B Surface Antigen NON REACTIVE    PROTEIN/CREAT RATIO URINE    Collection Time: 06/20/17 10:50 AM   Result Value Ref Range    Total Protein, Urine 4.2 0.0 - 15.0 mg/dL    Creatinine, Random Urine 48.30 mg/dL    Protein Creatinine Ratio 87 10 - 107 mg/g   POC UA    Collection Time: 06/20/17 11:15 AM   Result Value Ref Range    POC Color Yellow     POC Appearance Clear     POC Glucose Negative Negative mg/dL    POC Ketones Negative Negative mg/dL     POC Specific Gravity 1.010 1.005-1.030    POC Blood Negative Negative    POC Urine PH 5.5 5.0-8.0    POC Protein Negative Negative mg/dL    POC Nitrites Negative Negative    POC Leukocyte Esterase Negative Negative   CBC WITH DIFFERENTIAL    Collection Time: 06/20/17 11:55 AM   Result Value Ref Range    WBC 13.3 (H) 4.8 - 10.8 K/uL    RBC 3.86 (L) 4.20 - 5.40 M/uL    Hemoglobin 12.0 12.0 - 16.0 g/dL    Hematocrit 35.2 (L) 37.0 - 47.0 %    MCV 91.2 81.4 - 97.8 fL    MCH 31.1 27.0 - 33.0 pg    MCHC 34.1 33.6 - 35.0 g/dL    RDW 42.8 35.9 - 50.0 fL    Platelet Count 287 164 - 446 K/uL    MPV 10.2 9.0 - 12.9 fL    Neutrophils-Polys 74.40 (H) 44.00 - 72.00 %    Lymphocytes 16.90 (L) 22.00 - 41.00 %    Monocytes 6.30 0.00 - 13.40 %    Eosinophils 0.50 0.00 - 6.90 %    Basophils 0.50 0.00 - 1.80 %    Immature Granulocytes 1.40 (H) 0.00 - 0.90 %    Nucleated RBC 0.00 /100 WBC    Neutrophils (Absolute) 9.88 (H) 2.00 - 7.15 K/uL    Lymphs (Absolute) 2.24 1.00 - 4.80 K/uL    Monos (Absolute) 0.84 0.00 - 0.85 K/uL    Eos (Absolute) 0.06 0.00 - 0.51 K/uL    Baso (Absolute) 0.06 0.00 - 0.12 K/uL    Immature Granulocytes (abs) 0.18 (H) 0.00 - 0.11 K/uL    NRBC (Absolute) 0.00 K/uL   COMP METABOLIC PANEL    Collection Time: 06/20/17 11:55 AM   Result Value Ref Range    Sodium 137 135 - 145 mmol/L    Potassium 3.6 3.6 - 5.5 mmol/L    Chloride 109 96 - 112 mmol/L    Co2 18 (L) 20 - 33 mmol/L    Anion Gap 10.0 0.0 - 11.9    Glucose 109 (H) 65 - 99 mg/dL    Bun 6 (L) 8 - 22 mg/dL    Creatinine 0.54 0.50 - 1.40 mg/dL    Calcium 9.2 8.5 - 10.5 mg/dL    AST(SGOT) 12 12 - 45 U/L    ALT(SGPT) 10 2 - 50 U/L    Alkaline Phosphatase 82 30 - 99 U/L    Total Bilirubin 0.4 0.1 - 1.5 mg/dL    Albumin 3.3 3.2 - 4.9 g/dL    Total Protein 6.3 6.0 - 8.2 g/dL    Globulin 3.0 1.9 - 3.5 g/dL    A-G Ratio 1.1 g/dL   URIC ACID    Collection Time: 06/20/17 11:55 AM   Result Value Ref Range    Uric Acid 4.5 1.9 - 8.2 mg/dL   ESTIMATED GFR    Collection  Time: 06/20/17 11:55 AM   Result Value Ref Range    GFR If African American >60 >60 mL/min/1.73 m 2    GFR If Non African American >60 >60 mL/min/1.73 m 2        Assessment:   Murray Chavez at 29w2d  Labor status: Not in labor.  No evidence of Preeclampsia  Pt does have gestational hypertension       Plan:   Home with preeclampsia precautions  FU with Dr. Ulloa this week.

## 2017-06-20 NOTE — PROGRESS NOTES
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 Herrera called orders for labs and will watch the pt for a few hours  1155- pih labs drawn, all bp's beside the 1st one have been WNL, pt does c/o migraine HA that she has been dealing with her whole pregnancy  1330- labs back called Dr. Herrera with results she will see the pt and let me know the plan  1400- Dr. Herrera at bedside sve closed/thick, orders to d/c home   1410- pt given discharge instructions, pt verbalized understanding, pt discharged home with friend

## 2017-06-20 NOTE — TELEPHONE ENCOUNTER
Patient called and states her last BP check was 160/110, and she is experincing swelling and has moderate protein in her urine. Please call patient

## 2017-06-26 ENCOUNTER — ROUTINE PRENATAL (OUTPATIENT)
Dept: OBGYN | Facility: CLINIC | Age: 32
End: 2017-06-26
Payer: COMMERCIAL

## 2017-06-26 VITALS — SYSTOLIC BLOOD PRESSURE: 130 MMHG | DIASTOLIC BLOOD PRESSURE: 80 MMHG | WEIGHT: 233 LBS

## 2017-06-26 DIAGNOSIS — G43.909 MIGRAINE WITHOUT STATUS MIGRAINOSUS, NOT INTRACTABLE, UNSPECIFIED MIGRAINE TYPE: ICD-10-CM

## 2017-06-26 DIAGNOSIS — O13.3 GESTATIONAL HYPERTENSION WITHOUT SIGNIFICANT PROTEINURIA IN THIRD TRIMESTER: ICD-10-CM

## 2017-06-26 PROCEDURE — 90040 PR PRENATAL FOLLOW UP: CPT | Performed by: OBSTETRICS & GYNECOLOGY

## 2017-06-26 NOTE — MR AVS SNAPSHOT
Murray Chavez   2017 1:15 PM   Routine Prenatal   MRN: [id number]    Department[facility]  [facility]   Dept Phone:  [phone]    Description:  Female : 1985   Provider:  Barbra Ulloa M.D.           Allergies as of 2017     No Known Allergies      You were diagnosed with     Gestational hypertension without significant proteinuria in third trimester   [198535]       Migraine without status migrainosus, not intractable, unspecified migraine type   [[id number]]         Vital Signs     Blood Pressure Weight Smoking Status             130/80 mmHg 105.688 kg (233 lb) Never Smoker          Basic Information     Date Of Birth Sex Race Ethnicity Preferred Language    1985 Female White Non- English      Your appointments     Jul 10, 2017  9:30 AM   OB Follow Up with Barbra Ulloa M.D.   [facility] ([address])    [address] [place] [id number] [phone]            2017 10:15 AM   OB Follow Up with Barbra Ulloa M.D.   [facility] ([address])    [address] [place] [id number] [phone]            Aug 07, 2017  1:30 PM   OB Follow Up with Barbra Ulloa M.D.   [facility] ([address])    [address] [place] [phone]   [phone]            Aug 21, 2017  9:00 AM   OB Follow Up with Barbra Ulloa M.D.   [facility] ([address])    [address] [place] [id number] [phone]              Problem List              ICD-10-CM Priority Class Noted - Resolved    Migraines G43.909   2017 - Present    Gestational hypertension without significant proteinuria O13.9   2017 - Present      Health Maintenance        Date Due Completion Dates    IMM DTaP/Tdap/Td Vaccine (1 - Tdap) 2006 ---    PAP SMEAR 3/17/2020 3/17/2017            Current Immunizations     Tuberculin Skin Test  10/29/2013  4:25 PM      Below and/or attached are the medications your provider expects you to take. Review all of your home medications and newly ordered medications with your provider and/or pharmacist. Follow medication instructions as directed by your provider and/or pharmacist. Please keep your medication list with you and share with your provider. Update the information when medications are discontinued, doses are changed, or new medications (including over-the-counter products) are added; and carry medication information at all times in the event of emergency situations     Allergies:  No Known Allergies          Medications  Valid as of: June 26, 2017 -  1:36 PM    Generic Name Brand Name Tablet Size Instructions for use    Butalbital-APAP-Caffeine (Tab) FIORICET -40 MG Take 1 Tab by mouth every four hours as needed for Headache.        Omeprazole (CAPSULE DELAYED RELEASE) PRILOSEC 20 MG Take 1 Cap by mouth every day.        Prenatal MV-Min-Fe Fum-FA-DHA   Take  by mouth.        .                 Medicines prescribed today were sent to:     [facility]/PHARMACY #[id number] - [place], [place] - [address] [address]    Phone: [phone] Fax: [phone]    Open 24 Hours?: No      Medication refill instructions:       If your prescription bottle indicates you have medication refills left, it is not necessary to call your provider’s office. Please contact your pharmacy and they will refill your medication.    If your prescription bottle indicates you do not have any refills left, you may request refills at any time through one of the following ways: The online [employer] system (except Urgent Care), by calling your provider’s office, or by asking your pharmacy to contact your provider’s office with a refill request. Medication refills are processed only during regular business hours and may not be available until the next business day. Your provider may request additional information or to have  a follow-up visit with you prior to refilling your medication.   *Please Note: Medication refills are assigned a new Rx number when refilled electronically. Your pharmacy may indicate that no refills were authorized even though a new prescription for the same medication is available at the pharmacy. Please request the medicine by name with the pharmacy before contacting your provider for a refill.           Cortexymehart Access Code: Activation code not generated  Current Dovo Status: Active

## 2017-06-26 NOTE — PROGRESS NOTES
31 y.o.  30w1d The patient is here for routine obstetrical followup. She reports good fetal movement. She denies contractions, vaginal bleeding, or leaking of fluid. C/O pelvic pressure and daily headaches.  Seen on L&D on  for elevated BP at work.  Not coping well with work, stressed, and exhausted.  Headache 4/10 intensity, taking Fioricet prn.     The patient's pregnancy is complicated by   Patient Active Problem List    Diagnosis Date Noted   • Migraines 2017   • Gestational hypertension without significant proteinuria 2017     Cx - closed/thick/high  Ext - 1+ pedal edema.    Note given for work due to daily headaches and intermittent elevated BP at work.  If headaches do no improve on bedrest, pt will f/u with neurology for discussion of resuming suppression medications.     Urged pt to complete 28 wk labs.       Followup in 2 weeks   labor precautions were discussed with patient  Fetal kick counts were discussed with patient

## 2017-07-07 ENCOUNTER — HOSPITAL ENCOUNTER (OUTPATIENT)
Dept: LAB | Facility: MEDICAL CENTER | Age: 32
End: 2017-07-07
Attending: OBSTETRICS & GYNECOLOGY
Payer: COMMERCIAL

## 2017-07-07 DIAGNOSIS — Z34.93 NORMAL PREGNANCY IN THIRD TRIMESTER: ICD-10-CM

## 2017-07-07 LAB
BASOPHILS # BLD AUTO: 0.2 % (ref 0–1.8)
BASOPHILS # BLD: 0.04 K/UL (ref 0–0.12)
EOSINOPHIL # BLD AUTO: 0.08 K/UL (ref 0–0.51)
EOSINOPHIL NFR BLD: 0.5 % (ref 0–6.9)
ERYTHROCYTE [DISTWIDTH] IN BLOOD BY AUTOMATED COUNT: 46.5 FL (ref 35.9–50)
GLUCOSE 1H P 50 G GLC PO SERPL-MCNC: 96 MG/DL (ref 70–139)
HCT VFR BLD AUTO: 40.7 % (ref 37–47)
HGB BLD-MCNC: 13.3 G/DL (ref 12–16)
IMM GRANULOCYTES # BLD AUTO: 0.17 K/UL (ref 0–0.11)
IMM GRANULOCYTES NFR BLD AUTO: 1 % (ref 0–0.9)
LYMPHOCYTES # BLD AUTO: 2.27 K/UL (ref 1–4.8)
LYMPHOCYTES NFR BLD: 13.9 % (ref 22–41)
MCH RBC QN AUTO: 31.3 PG (ref 27–33)
MCHC RBC AUTO-ENTMCNC: 32.7 G/DL (ref 33.6–35)
MCV RBC AUTO: 95.8 FL (ref 81.4–97.8)
MONOCYTES # BLD AUTO: 1.06 K/UL (ref 0–0.85)
MONOCYTES NFR BLD AUTO: 6.5 % (ref 0–13.4)
NEUTROPHILS # BLD AUTO: 12.7 K/UL (ref 2–7.15)
NEUTROPHILS NFR BLD: 77.9 % (ref 44–72)
NRBC # BLD AUTO: 0 K/UL
NRBC BLD AUTO-RTO: 0 /100 WBC
PLATELET # BLD AUTO: 312 K/UL (ref 164–446)
PMV BLD AUTO: 11 FL (ref 9–12.9)
RBC # BLD AUTO: 4.25 M/UL (ref 4.2–5.4)
TREPONEMA PALLIDUM IGG+IGM AB [PRESENCE] IN SERUM OR PLASMA BY IMMUNOASSAY: NON REACTIVE
WBC # BLD AUTO: 16.3 K/UL (ref 4.8–10.8)

## 2017-07-07 PROCEDURE — 36415 COLL VENOUS BLD VENIPUNCTURE: CPT

## 2017-07-07 PROCEDURE — 86780 TREPONEMA PALLIDUM: CPT

## 2017-07-07 PROCEDURE — 85025 COMPLETE CBC W/AUTO DIFF WBC: CPT

## 2017-07-07 PROCEDURE — 82950 GLUCOSE TEST: CPT

## 2017-07-10 ENCOUNTER — ROUTINE PRENATAL (OUTPATIENT)
Dept: OBGYN | Facility: CLINIC | Age: 32
End: 2017-07-10
Payer: COMMERCIAL

## 2017-07-10 VITALS — DIASTOLIC BLOOD PRESSURE: 62 MMHG | SYSTOLIC BLOOD PRESSURE: 102 MMHG | WEIGHT: 234 LBS

## 2017-07-10 DIAGNOSIS — O13.3 GESTATIONAL HYPERTENSION WITHOUT SIGNIFICANT PROTEINURIA IN THIRD TRIMESTER: ICD-10-CM

## 2017-07-10 PROCEDURE — 90040 PR PRENATAL FOLLOW UP: CPT | Performed by: OBSTETRICS & GYNECOLOGY

## 2017-07-10 PROCEDURE — 90715 TDAP VACCINE 7 YRS/> IM: CPT | Performed by: OBSTETRICS & GYNECOLOGY

## 2017-07-10 PROCEDURE — 90471 IMMUNIZATION ADMIN: CPT | Performed by: OBSTETRICS & GYNECOLOGY

## 2017-07-10 NOTE — PROGRESS NOTES
31 y.o.  32w1d The patient is here for routine obstetrical followup. She reports good fetal movement. She denies regular contractions, vaginal bleeding, or leaking of fluid.  Continues with daily headaches - has seen neurology.  She does not want to take the gabapentin or zoloft, which she uses to prevent headaches when not pregnant.  She takes fioricet prn.  Feeling much better when not working.  Under a lot of stress -  Her mother needs heart surgery.     The patient's pregnancy is complicated by   Patient Active Problem List    Diagnosis Date Noted   • Migraines 2017   • Gestational hypertension without significant proteinuria 2017     28 wk labs reviewed - normal.   Pedal edema improved - 1+ bilaterally, no calf tenderness.     tdap today.    Followup in 2 weeks   labor precautions were discussed with patient  Fetal kick counts were discussed with patient     84.8

## 2017-07-10 NOTE — MR AVS SNAPSHOT
Murray Chavez   7/10/2017 9:30 AM   Routine Prenatal   MRN: [id number]    Department:  [facility]   Dept Phone:  [phone]    Description:  Female : 1985   Provider:  Barbra Ulloa M.D.           Allergies as of 7/10/2017     No Known Allergies      You were diagnosed with     Gestational hypertension without significant proteinuria in third trimester   [840005]         Vital Signs     Blood Pressure Weight Smoking Status             102/62 mmHg 106.142 kg (234 lb) Never Smoker          Basic Information     Date Of Birth Sex Race Ethnicity Preferred Language    1985 Female White Non- English      Your appointments     2017 10:15 AM   OB Follow Up with Barbra Ulloa M.D.   [facility] ([address])    [address] [place] [address], [address] [id number] [phone]            Aug 07, 2017  1:30 PM   OB Follow Up with Barbra Ulloa M.D.   [facility] ([address])    [address] [place] [address], [address] [id number] [phone]            Aug 21, 2017  9:00 AM   OB Follow Up with Barbra Ulloa M.D.   [facility] ([address])    [address] [place] [address], [address] [id number] [phone]              Problem List              ICD-10-CM Priority Class Noted - Resolved    Migraines G43.909   2017 - Present    Gestational hypertension without significant proteinuria O13.9   2017 - Present      Health Maintenance        Date Due Completion Dates    IMM DTaP/Tdap/Td Vaccine (1 - Tdap) 2006 ---    IMM INFLUENZA (1) 2017 ---    PAP SMEAR 3/17/2020 3/17/2017            Current Immunizations     Tdap Vaccine 7/10/2017  9:45 AM    Tuberculin Skin Test 10/29/2013  4:25 PM      Below and/or attached are the medications your provider expects you to take. Review all of your home medications and newly ordered medications with your provider and/or pharmacist. Follow medication  instructions as directed by your provider and/or pharmacist. Please keep your medication list with you and share with your provider. Update the information when medications are discontinued, doses are changed, or new medications (including over-the-counter products) are added; and carry medication information at all times in the event of emergency situations     Allergies:  No Known Allergies          Medications  Valid as of: July 10, 2017 - 10:55 AM    Generic Name Brand Name Tablet Size Instructions for use    Butalbital-APAP-Caffeine (Tab) FIORICET -40 MG Take 1 Tab by mouth every four hours as needed for Headache.        Omeprazole (CAPSULE DELAYED RELEASE) PRILOSEC 20 MG Take 1 Cap by mouth every day.        Prenatal MV-Min-Fe Fum-FA-DHA   Take  by mouth.        .                 Medicines prescribed today were sent to:     [facility]/PHARMACY #[id number] - [place], [place] - [address] [id number] [facility] [address]    Phone: [phone] Fax: [phone]    Open 24 Hours?: No      Medication refill instructions:       If your prescription bottle indicates you have medication refills left, it is not necessary to call your provider’s office. Please contact your pharmacy and they will refill your medication.    If your prescription bottle indicates you do not have any refills left, you may request refills at any time through one of the following ways: The online Mr[employer] system (except Urgent Care), by calling your provider’s office, or by asking your pharmacy to contact your provider’s office with a refill request. Medication refills are processed only during regular business hours and may not be available until the next business day. Your provider may request additional information or to have a follow-up visit with you prior to refilling your medication.   *Please Note: Medication refills are assigned a new Rx number when refilled electronically. Your pharmacy may indicate that no refills were authorized even  though a new prescription for the same medication is available at the pharmacy. Please request the medicine by name with the pharmacy before contacting your provider for a refill.           [employer]ou[employer] Access Code: Activation code not generated  Current [employer] Status: Active

## 2017-07-10 NOTE — Clinical Note
July 10, 2017       Patient: Murray Chavez   YOB: 1985   Date of Visit: 7/10/2017         To Whom It May Concern:    It is my medical opinion that Murray Chavez remain out of work due to gestational hypertension, migraines, and contractions.    If you have any questions or concerns, please don't hesitate to call [phone]          Sincerely,          Barbra Ulloa M.D.  Electronically Signed

## 2017-07-28 ENCOUNTER — ROUTINE PRENATAL (OUTPATIENT)
Dept: OBGYN | Facility: CLINIC | Age: 32
End: 2017-07-28
Payer: COMMERCIAL

## 2017-07-28 VITALS — DIASTOLIC BLOOD PRESSURE: 82 MMHG | SYSTOLIC BLOOD PRESSURE: 122 MMHG | WEIGHT: 236 LBS

## 2017-07-28 DIAGNOSIS — O13.3 GESTATIONAL HYPERTENSION WITHOUT SIGNIFICANT PROTEINURIA IN THIRD TRIMESTER: ICD-10-CM

## 2017-07-28 PROCEDURE — 90040 PR PRENATAL FOLLOW UP: CPT | Performed by: OBSTETRICS & GYNECOLOGY

## 2017-07-28 NOTE — MR AVS SNAPSHOT
Murray Chavez   2017 9:45 AM   Routine Prenatal   MRN: [id number]    Department:  [facility]   Dept Phone:  [phone]    Description:  Female : 1985   Provider:  Barbra Ulloa M.D.           Allergies as of 2017     No Known Allergies      You were diagnosed with     Gestational hypertension without significant proteinuria in third trimester   [088533]         Vital Signs     Blood Pressure Weight Smoking Status             122/82 mmHg 107.049 kg (236 lb) Never Smoker          Basic Information     Date Of Birth Sex Race Ethnicity Preferred Language    1985 Female White Non- English      Your appointments     Aug 03, 2017  8:30 AM   OB Follow Up with OBGYN [facility]   [facility] ([address])    [address] [place] [address], [address]  [facility] [id number] [phone]            Aug 07, 2017  1:30 PM   OB Follow Up with Barbra Ulloa M.D.   [facility] ([address])    [address] [place] [address], [address]  [facility] [id number] [phone]            Aug 21, 2017  9:00 AM   OB Follow Up with Barbra Ulloa M.D.   [facility] ([address])    [address] [place] [address], [address]  [facility] [address] [phone]              Problem List              ICD-10-CM Priority Class Noted - Resolved    Migraines G43.909   2017 - Present    Gestational hypertension without significant proteinuria O13.9   2017 - Present      Health Maintenance        Date Due Completion Dates    IMM INFLUENZA (1) 2017 ---    PAP SMEAR 3/17/2020 3/17/2017    IMM DTaP/Tdap/Td Vaccine (2 - Td) 7/10/2027 7/10/2017            Current Immunizations     Tdap Vaccine 7/10/2017  9:45 AM    Tuberculin Skin Test 10/29/2013  4:25 PM      Below and/or attached are the medications your provider expects you to take. Review all of your home medications and newly ordered medications with your provider and/or pharmacist. Follow  medication instructions as directed by your provider and/or pharmacist. Please keep your medication list with you and share with your provider. Update the information when medications are discontinued, doses are changed, or new medications (including over-the-counter products) are added; and carry medication information at all times in the event of emergency situations     Allergies:  No Known Allergies          Medications  Valid as of: July 28, 2017 - 10:00 AM    Generic Name Brand Name Tablet Size Instructions for use    Butalbital-APAP-Caffeine (Tab) FIORICET -40 MG Take 1 Tab by mouth every four hours as needed for Headache.        Omeprazole (CAPSULE DELAYED RELEASE) PRILOSEC 20 MG Take 1 Cap by mouth every day.        Prenatal MV-Min-Fe Fum-FA-DHA   Take  by mouth.        .                 Medicines prescribed today were sent to:     [facility]/PHARMACY #[id number] - [place], [place] - [address] [address] [facility] [address]    Phone: [phone] Fax: [phone]    Open 24 Hours?: No      Medication refill instructions:       If your prescription bottle indicates you have medication refills left, it is not necessary to call your provider’s office. Please contact your pharmacy and they will refill your medication.    If your prescription bottle indicates you do not have any refills left, you may request refills at any time through one of the following ways: The online [employer] system (except Urgent Care), by calling your provider’s office, or by asking your pharmacy to contact your provider’s office with a refill request. Medication refills are processed only during regular business hours and may not be available until the next business day. Your provider may request additional information or to have a follow-up visit with you prior to refilling your medication.   *Please Note: Medication refills are assigned a new Rx number when refilled electronically. Your pharmacy may indicate that no refills were  authorized even though a new prescription for the same medication is available at the pharmacy. Please request the medicine by name with the pharmacy before contacting your provider for a refill.           T[employer] Access Code: Activation code not generated  Current [employer] Status: Active

## 2017-07-28 NOTE — PROGRESS NOTES
31 y.o.  34w5d The patient is here for routine obstetrical followup. She reports good fetal movement. She denie vaginal bleeding or leaking of fluid.  Having 1-2 contractions per hour when active. Continues with nearly daily headaches.  Started back on Neurontin by neurologist.  She is very stressed - mother having bypass surgery today.      The patient's pregnancy is complicated by   Patient Active Problem List    Diagnosis Date Noted   • Migraines 2017   • Gestational hypertension without significant proteinuria 2017         Followup in 1 week   labor precautions were discussed with patient  Fetal kick counts were discussed with patient

## 2017-08-03 ENCOUNTER — HOSPITAL ENCOUNTER (OUTPATIENT)
Facility: MEDICAL CENTER | Age: 32
End: 2017-08-03
Attending: NURSE PRACTITIONER
Payer: COMMERCIAL

## 2017-08-03 ENCOUNTER — ROUTINE PRENATAL (OUTPATIENT)
Dept: OBGYN | Facility: CLINIC | Age: 32
End: 2017-08-03
Payer: COMMERCIAL

## 2017-08-03 VITALS — DIASTOLIC BLOOD PRESSURE: 82 MMHG | SYSTOLIC BLOOD PRESSURE: 128 MMHG | WEIGHT: 233 LBS

## 2017-08-03 DIAGNOSIS — O13.3 GESTATIONAL HYPERTENSION WITHOUT SIGNIFICANT PROTEINURIA IN THIRD TRIMESTER: ICD-10-CM

## 2017-08-03 DIAGNOSIS — Z34.83 SUPERVISION OF NORMAL INTRAUTERINE PREGNANCY IN MULTIGRAVIDA IN THIRD TRIMESTER: ICD-10-CM

## 2017-08-03 PROCEDURE — 90040 PR PRENATAL FOLLOW UP: CPT | Performed by: NURSE PRACTITIONER

## 2017-08-03 PROCEDURE — 87653 STREP B DNA AMP PROBE: CPT

## 2017-08-03 NOTE — PROGRESS NOTES
S: Murray Chavez at [id number] for OB  follow up visit.  Patient  Has complaints of vaginal pressure, and wondering about b/p today as swelling   Fetal movement is +  Denies any loss of fluid, vaginal bleeding or contractions.    O: VSS  Urine dip Negative for all protein negative   See above values  Cervical exam closed/0/ floating    A: multip 35w4d  Elevated b/ps    P:   3rd  Trimester Guidance and comfort measures, diet and exercise review.  Labs: GBS  Fetal Kick Count continue   RTC in 1 week    labor s/s reviewed

## 2017-08-03 NOTE — MR AVS SNAPSHOT
Murray Chavez   8/3/2017 8:30 AM   Routine Prenatal   MRN: [id number]    Department:  [facility]   Dept Phone:  [phone]    Description:  Female : 1985   Provider:  JAYME Gerber           Allergies as of 8/3/2017     No Known Allergies      You were diagnosed with     Gestational hypertension without significant proteinuria in third trimester   [[id number]]       Supervision of normal intrauterine pregnancy in multigravida in third trimester   [2549733]         Vital Signs     Blood Pressure Weight Smoking Status             128/82 mmHg 105.688 kg (233 lb) Never Smoker          Basic Information     Date Of Birth Sex Race Ethnicity Preferred Language    1985 Female White Non- English      Your appointments     Aug 07, 2017  1:30 PM   OB Follow Up with Barbra Ulloa M.D.   [facility] ([address])    [address] [place] [address], [address]  [facility] [phone] [phone]            Aug 21, 2017  9:00 AM   OB Follow Up with Barbra Ulloa M.D.   [facility] ([address])    [address] [place] [address], [address]  [facility] [id number] [phone]              Problem List              ICD-10-CM Priority Class Noted - Resolved    Migraines G43.909   2017 - Present    Gestational hypertension without significant proteinuria O13.9   2017 - Present    Supervision of normal intrauterine pregnancy in multigravida in third trimester Z34.83   8/3/2017 - Present      Health Maintenance        Date Due Completion Dates    IMM INFLUENZA (1) 2017 ---    PAP SMEAR 3/17/2020 3/17/2017    IMM DTaP/Tdap/Td Vaccine (2 - Td) 7/10/2027 7/10/2017            Current Immunizations     Tdap Vaccine 7/10/2017  9:45 AM    Tuberculin Skin Test 10/29/2013  4:25 PM      Below and/or attached are the medications your provider expects you to take. Review all of your home medications and newly ordered medications with your provider and/or  pharmacist. Follow medication instructions as directed by your provider and/or pharmacist. Please keep your medication list with you and share with your provider. Update the information when medications are discontinued, doses are changed, or new medications (including over-the-counter products) are added; and carry medication information at all times in the event of emergency situations     Allergies:  No Known Allergies          Medications  Valid as of: August 03, 2017 -  9:12 AM    Generic Name Brand Name Tablet Size Instructions for use    Butalbital-APAP-Caffeine (Tab) FIORICET -40 MG Take 1 Tab by mouth every four hours as needed for Headache.        Omeprazole (CAPSULE DELAYED RELEASE) PRILOSEC 20 MG Take 1 Cap by mouth every day.        Prenatal MV-Min-Fe Fum-FA-DHA   Take  by mouth.        .                 Medicines prescribed today were sent to:     [facility]/PHARMACY #[id number] - [place], [place] - [address] [address] [facility] [address]    Phone: [phone] Fax: [phone]    Open 24 Hours?: No      Medication refill instructions:       If your prescription bottle indicates you have medication refills left, it is not necessary to call your provider’s office. Please contact your pharmacy and they will refill your medication.    If your prescription bottle indicates you do not have any refills left, you may request refills at any time through one of the following ways: The online Cream.HR system (except Urgent Care), by calling your provider’s office, or by asking your pharmacy to contact your provider’s office with a refill request. Medication refills are processed only during regular business hours and may not be available until the next business day. Your provider may request additional information or to have a follow-up visit with you prior to refilling your medication.   *Please Note: Medication refills are assigned a new Rx number when refilled electronically. Your pharmacy may indicate that no  refills were authorized even though a new prescription for the same medication is available at the pharmacy. Please request the medicine by name with the pharmacy before contacting your provider for a refill.        Your To Do List     Future Labs/Procedures Complete By Expires    GRP B STREP, BY PCR (GOODEN BROTH)  As directed 8/4/2018         MyChart Access Code: Activation code not generated  Current [employer] Status: Active

## 2017-08-04 LAB — GP B STREP DNA SPEC QL NAA+PROBE: NEGATIVE

## 2017-08-07 ENCOUNTER — ROUTINE PRENATAL (OUTPATIENT)
Dept: OBGYN | Facility: CLINIC | Age: 32
End: 2017-08-07
Payer: COMMERCIAL

## 2017-08-07 VITALS — DIASTOLIC BLOOD PRESSURE: 82 MMHG | SYSTOLIC BLOOD PRESSURE: 142 MMHG | WEIGHT: 233 LBS

## 2017-08-07 DIAGNOSIS — O13.3 GESTATIONAL HYPERTENSION WITHOUT SIGNIFICANT PROTEINURIA IN THIRD TRIMESTER: ICD-10-CM

## 2017-08-07 DIAGNOSIS — Z34.83 SUPERVISION OF NORMAL INTRAUTERINE PREGNANCY IN MULTIGRAVIDA IN THIRD TRIMESTER: ICD-10-CM

## 2017-08-07 PROCEDURE — 90040 PR PRENATAL FOLLOW UP: CPT | Performed by: OBSTETRICS & GYNECOLOGY

## 2017-08-07 NOTE — PROGRESS NOTES
31 y.o.  36w1d The patient is here for routine obstetrical followup. She reports good fetal movement. She denies contractions, vaginal bleeding, or leaking of fluid.  Continues with intermittent headaches.  No scotoma, RUQ pain.  Continues with bilateral LE edema.    The patient's pregnancy is complicated by   Patient Active Problem List    Diagnosis Date Noted   • Supervision of normal intrauterine pregnancy in multigravida in third trimester 2017   • Migraines 2017   • Gestational hypertension without significant proteinuria 2017     Repeat /80 with proper sized cuff.  Cephalic presentation confirmed with limited ABD US.    GBS - neg  Followup in 1 week   labor/PIH precautions were discussed with patient  Fetal kick counts were discussed with patient

## 2017-08-07 NOTE — MR AVS SNAPSHOT
Murray Chavez   2017 1:30 PM   Routine Prenatal   MRN: [id number]    Department[facility]  [facility]   Dept Phone:  [phone]    Description:  Female : 1985   Provider:  Barbra Ulloa M.D.           Allergies as of 2017     No Known Allergies      You were diagnosed with     Supervision of normal intrauterine pregnancy in multigravida in third trimester   [0968925]       Gestational hypertension without significant proteinuria in third trimester   [[id number]]         Vital Signs     Blood Pressure Weight Smoking Status             142/82 mmHg 105.688 kg (233 lb) Never Smoker          Basic Information     Date Of Birth Sex Race Ethnicity Preferred Language    1985 Female White Non- English      Your appointments     Aug 14, 2017 10:30 AM   OB Follow Up with Fernando Wen M.D.   [facility] ([address])    [address] [place] [address], [address] [phone] [phone]            Aug 21, 2017  9:00 AM   OB Follow Up with Barbra Ulloa M.D.   [facility] ([address])    [address] [place] [address], [address] [phone] [phone]              Problem List              ICD-10-CM Priority Class Noted - Resolved    Migraines G43.909   2017 - Present    Gestational hypertension without significant proteinuria O13.9   2017 - Present    Supervision of normal intrauterine pregnancy in multigravida in third trimester Z34.83   8/3/2017 - Present      Health Maintenance        Date Due Completion Dates    IMM INFLUENZA (1) 2017 ---    PAP SMEAR 3/17/2020 3/17/2017    IMM DTaP/Tdap/Td Vaccine (2 - Td) 7/10/2027 7/10/2017            Current Immunizations     Tdap Vaccine 7/10/2017  9:45 AM    Tuberculin Skin Test 10/29/2013  4:25 PM      Below and/or attached are the medications your provider expects you to take. Review all of your home medications and newly ordered medications with your provider and/or  pharmacist. Follow medication instructions as directed by your provider and/or pharmacist. Please keep your medication list with you and share with your provider. Update the information when medications are discontinued, doses are changed, or new medications (including over-the-counter products) are added; and carry medication information at all times in the event of emergency situations     Allergies:  No Known Allergies          Medications  Valid as of: August 07, 2017 -  1:41 PM    Generic Name Brand Name Tablet Size Instructions for use    Butalbital-APAP-Caffeine (Tab) FIORICET -40 MG Take 1 Tab by mouth every four hours as needed for Headache.        Omeprazole (CAPSULE DELAYED RELEASE) PRILOSEC 20 MG Take 1 Cap by mouth every day.        Prenatal MV-Min-Fe Fum-FA-DHA   Take  by mouth.        .                 Medicines prescribed today were sent to:     [facility]/PHARMACY #[id number] - [place], [place] - [address] [id number] [facility] [address]    Phone: [phone] Fax: [phone]    Open 24 Hours?: No      Medication refill instructions:       If your prescription bottle indicates you have medication refills left, it is not necessary to call your provider’s office. Please contact your pharmacy and they will refill your medication.    If your prescription bottle indicates you do not have any refills left, you may request refills at any time through one of the following ways: The online Voiceit system (except Urgent Care), by calling your provider’s office, or by asking your pharmacy to contact your provider’s office with a refill request. Medication refills are processed only during regular business hours and may not be available until the next business day. Your provider may request additional information or to have a follow-up visit with you prior to refilling your medication.   *Please Note: Medication refills are assigned a new Rx number when refilled electronically. Your pharmacy may indicate that no  refills were authorized even though a new prescription for the same medication is available at the pharmacy. Please request the medicine by name with the pharmacy before contacting your provider for a refill.           [employer] Access Code: Activation code not generated  Current [employer] Status: Active

## 2017-08-10 ENCOUNTER — HOSPITAL ENCOUNTER (OUTPATIENT)
Facility: MEDICAL CENTER | Age: 32
End: 2017-08-10
Attending: OBSTETRICS & GYNECOLOGY | Admitting: OBSTETRICS & GYNECOLOGY
Payer: COMMERCIAL

## 2017-08-10 VITALS — HEART RATE: 74 BPM | SYSTOLIC BLOOD PRESSURE: 114 MMHG | DIASTOLIC BLOOD PRESSURE: 78 MMHG | TEMPERATURE: 97.9 F

## 2017-08-10 LAB
APPEARANCE UR: CLEAR
COLOR UR AUTO: YELLOW
CRYSTALS AMN MICRO: NORMAL
GLUCOSE UR QL STRIP.AUTO: NEGATIVE MG/DL
KETONES UR QL STRIP.AUTO: ABNORMAL MG/DL
LEUKOCYTE ESTERASE UR QL STRIP.AUTO: NEGATIVE
NITRITE UR QL STRIP.AUTO: NEGATIVE
PH UR STRIP.AUTO: 6 [PH]
PROT UR QL STRIP: NEGATIVE MG/DL
RBC UR QL AUTO: NEGATIVE
SP GR UR: 1.01

## 2017-08-10 PROCEDURE — 89060 EXAM SYNOVIAL FLUID CRYSTALS: CPT

## 2017-08-10 PROCEDURE — 81002 URINALYSIS NONAUTO W/O SCOPE: CPT

## 2017-08-10 PROCEDURE — 59025 FETAL NON-STRESS TEST: CPT | Performed by: OBSTETRICS & GYNECOLOGY

## 2017-08-11 NOTE — PROGRESS NOTES
EDC 9/3 36w4d. Pt presents to L&D with complaints of SROM. PT taken to [facility] for assessment.      TOCO and EFM applied. PT stated that she has a history of PIH and has had a few high blood pressures and protein in her urine this pregnancy. Pt stated that she has a history of severe migraines that she has continued to take her medication approved by her nuerologist and OB during the pregnancy. Pt stated that she took her medication this morning but has not had the relief she normally would. Pt was by the pool most of the day but stated she was hydrating really well. Pt reported possible SROM after swimming. PT stated she noticed some mucous/pink tinged fluid on her bathing suit aprox. 30 mins after getting out of the pool. PT reports +FM. Pt stated she has also been feel a lot of pelvic pressure and some cramping.      SSE no pooling noted, FERN collected. SVE fingertip/thick. Will set BP to take every 10 mins. UA results, see chart.      Fern results back (negative). Will update Dr. Homar Brown once out of delivery.     Pt updated, pt stated she is feeling better and not feeling the cramping as much.      Dr. Homar Brown updated on pt status, orders for discharge received.      Pt educated on pre-term labor precautions and when to return. Pt verbalized understanding. Pt discharged home in stable condition.

## 2017-08-14 ENCOUNTER — TELEPHONE (OUTPATIENT)
Dept: OBGYN | Facility: CLINIC | Age: 32
End: 2017-08-14

## 2017-08-14 ENCOUNTER — ROUTINE PRENATAL (OUTPATIENT)
Dept: OBGYN | Facility: CLINIC | Age: 32
End: 2017-08-14
Payer: COMMERCIAL

## 2017-08-14 VITALS — WEIGHT: 233 LBS | DIASTOLIC BLOOD PRESSURE: 82 MMHG | SYSTOLIC BLOOD PRESSURE: 138 MMHG

## 2017-08-14 DIAGNOSIS — O13.3 GESTATIONAL HYPERTENSION WITHOUT SIGNIFICANT PROTEINURIA IN THIRD TRIMESTER: ICD-10-CM

## 2017-08-14 DIAGNOSIS — Z34.83 SUPERVISION OF NORMAL INTRAUTERINE PREGNANCY IN MULTIGRAVIDA IN THIRD TRIMESTER: ICD-10-CM

## 2017-08-14 PROCEDURE — 90040 PR PRENATAL FOLLOW UP: CPT | Performed by: OBSTETRICS & GYNECOLOGY

## 2017-08-14 NOTE — PROGRESS NOTES
OB Followup;    37w1d    Patient Active Problem List    Diagnosis Date Noted   • Supervision of normal intrauterine pregnancy in multigravida in third trimester 08/03/2017   • Migraines 04/11/2017   • Gestational hypertension without significant proteinuria 04/11/2017       Filed Vitals:    08/14/17 1023   BP: 138/82   Weight: 105.688 kg (233 lb)       Patient presents for followup of OB care. Currently doing well . Good fetal movement no leakage of fluid no contractions or vaginal bleeding        Size equals dates, normal fetal heart rate    Patient states she is having 3 contractions per hour     cervix-1.5 cm/thick        Labor precautions given  Increase oral hydration    Followup in  1 weeks     Patient requests elective induction of labor-order placed to schedule induction with Dr. Ulloa on 8/31

## 2017-08-14 NOTE — PROGRESS NOTES
Pt here for OB F/V. Good fetal movement. Denies LOF, VB.  L+D 8/10 for SROM- Fern test neg, NST  GBS NEG  CO headache today

## 2017-08-14 NOTE — MR AVS SNAPSHOT
Murray Chavez   2017 10:30 AM   Routine Prenatal   MRN: [id number]    Department:  [facility]   Dept Phone:  [phone]    Description:  Female : 1985   Provider:  Fernando Wen M.D.           Allergies as of 2017     No Known Allergies      You were diagnosed with     Supervision of normal intrauterine pregnancy in multigravida in third trimester   [3312185]       Gestational hypertension without significant proteinuria in third trimester   [807649]         Vital Signs     Blood Pressure Weight Smoking Status             138/82 mmHg 105.688 kg (233 lb) Never Smoker          Basic Information     Date Of Birth Sex Race Ethnicity Preferred Language    1985 Female White Non- English      Your appointments     Aug 21, 2017  9:00 AM   OB Follow Up with Barbra Ulloa M.D.   [facility] ([address], [address]  [facility] [address] [phone]              Problem List              ICD-10-CM Priority Class Noted - Resolved    Migraines G43.909   2017 - Present    Gestational hypertension without significant proteinuria O13.9   2017 - Present    Supervision of normal intrauterine pregnancy in multigravida in third trimester Z34.83   8/3/2017 - Present      Health Maintenance        Date Due Completion Dates    IMM INFLUENZA (1) 2017 ---    PAP SMEAR 3/17/2020 3/17/2017    IMM DTaP/Tdap/Td Vaccine (2 - Td) 7/10/2027 7/10/2017            Current Immunizations     Tdap Vaccine 7/10/2017  9:45 AM    Tuberculin Skin Test 10/29/2013  4:25 PM      Below and/or attached are the medications your provider expects you to take. Review all of your home medications and newly ordered medications with your provider and/or pharmacist. Follow medication instructions as directed by your provider and/or pharmacist. Please keep your medication list with you and share with your provider. Update the information when  medications are discontinued, doses are changed, or new medications (including over-the-counter products) are added; and carry medication information at all times in the event of emergency situations     Allergies:  No Known Allergies          Medications  Valid as of: August 14, 2017 - 11:29 AM    Generic Name Brand Name Tablet Size Instructions for use    Butalbital-APAP-Caffeine (Tab) FIORICET -40 MG Take 1 Tab by mouth every four hours as needed for Headache.        Omeprazole (CAPSULE DELAYED RELEASE) PRILOSEC 20 MG Take 1 Cap by mouth every day.        Prenatal MV-Min-Fe Fum-FA-DHA   Take  by mouth.        .                 Medicines prescribed today were sent to:     [id number] - [place] [place] [address]    [address] [address]    Phone: [phone] Fax: [phone]    Open 24 Hours?: No      Medication refill instructions:       If your prescription bottle indicates you have medication refills left, it is not necessary to call your provider’s office. Please contact your pharmacy and they will refill your medication.    If your prescription bottle indicates you do not have any refills left, you may request refills at any time through one of the following ways: The online [employer] system (except Urgent Care), by calling your provider’s office, or by asking your pharmacy to contact your provider’s office with a refill request. Medication refills are processed only during regular business hours and may not be available until the next business day. Your provider may request additional information or to have a follow-up visit with you prior to refilling your medication.   *Please Note: Medication refills are assigned a new Rx number when refilled electronically. Your pharmacy may indicate that no refills were authorized even though a new prescription for the same medication is available at the pharmacy. Please request the medicine by name with the pharmacy before contacting your  provider for a refill.        Your To Do List     Future Labs/Procedures Complete By Expires    INDUCTION OF LABOR  As directed 8/14/2018         MyChart Access Code: Activation code not generated  Current [employer] Status: Active

## 2017-08-14 NOTE — TELEPHONE ENCOUNTER
I spoke to patient and listened to her concerns. i will follow up with tomorrow after speaking to Dr. Ulloa

## 2017-08-15 ENCOUNTER — HOSPITAL ENCOUNTER (OUTPATIENT)
Facility: MEDICAL CENTER | Age: 32
End: 2017-08-15
Attending: OBSTETRICS & GYNECOLOGY | Admitting: OBSTETRICS & GYNECOLOGY
Payer: COMMERCIAL

## 2017-08-15 ENCOUNTER — TELEPHONE (OUTPATIENT)
Dept: OBGYN | Facility: CLINIC | Age: 32
End: 2017-08-15

## 2017-08-15 VITALS
TEMPERATURE: 98.4 F | DIASTOLIC BLOOD PRESSURE: 89 MMHG | RESPIRATION RATE: 16 BRPM | HEART RATE: 109 BPM | SYSTOLIC BLOOD PRESSURE: 136 MMHG

## 2017-08-15 LAB — CRYSTALS AMN MICRO: NORMAL

## 2017-08-15 PROCEDURE — 59025 FETAL NON-STRESS TEST: CPT | Performed by: OBSTETRICS & GYNECOLOGY

## 2017-08-15 PROCEDURE — 89060 EXAM SYNOVIAL FLUID CRYSTALS: CPT

## 2017-08-15 NOTE — TELEPHONE ENCOUNTER
Pt called back and stated that she had more concerning things happen throughout the night like several gushes of fluid which is completely involuntary and it is not clear or blood tinged. Pt wants to know why this is continuing to happen? Pt has already been to labor and delivery for this. Pt states this is happening while getting dressed also her blood pressure was high last night. Pt needs some reassurance after yesterday with Dr. Wen. Can you please advise? Pt states she will go back to l &d if you want her too.

## 2017-08-16 NOTE — PROGRESS NOTES
1625-pt presents from home with c/o decreased fetal movement, some uc's and possibly leaking fluid, no c/o bleeding or other pain, placed on external monitors, vs taken, SSE performed, no pooling noted, rolf slide prepared and sent, SVE 1-2/thick/ballotable, audible movement on monitors at this time, pt states that she is feeling much more movement now  1705-rolf back negative, TC Dr Wen, report given, discharge orders received  1715-pt discharged home with labor precautions, verbalized understanding, left ambulatory with family

## 2017-08-17 ENCOUNTER — TELEPHONE (OUTPATIENT)
Dept: OBGYN | Facility: CLINIC | Age: 32
End: 2017-08-17

## 2017-08-17 NOTE — TELEPHONE ENCOUNTER
CECIL Lea from [employer] called about some disability paperwork for patient. She can be reach at [phone]

## 2017-08-21 ENCOUNTER — ROUTINE PRENATAL (OUTPATIENT)
Dept: OBGYN | Facility: CLINIC | Age: 32
End: 2017-08-21
Payer: COMMERCIAL

## 2017-08-21 ENCOUNTER — HOSPITAL ENCOUNTER (OUTPATIENT)
Facility: MEDICAL CENTER | Age: 32
End: 2017-08-21
Attending: OBSTETRICS & GYNECOLOGY | Admitting: OBSTETRICS & GYNECOLOGY
Payer: COMMERCIAL

## 2017-08-21 VITALS
SYSTOLIC BLOOD PRESSURE: 100 MMHG | DIASTOLIC BLOOD PRESSURE: 53 MMHG | HEIGHT: 62 IN | BODY MASS INDEX: 46.01 KG/M2 | WEIGHT: 250 LBS | HEART RATE: 85 BPM

## 2017-08-21 VITALS — SYSTOLIC BLOOD PRESSURE: 120 MMHG | DIASTOLIC BLOOD PRESSURE: 62 MMHG | WEIGHT: 235 LBS

## 2017-08-21 DIAGNOSIS — Z34.83 SUPERVISION OF NORMAL INTRAUTERINE PREGNANCY IN MULTIGRAVIDA IN THIRD TRIMESTER: ICD-10-CM

## 2017-08-21 DIAGNOSIS — O13.3 GESTATIONAL HYPERTENSION WITHOUT SIGNIFICANT PROTEINURIA IN THIRD TRIMESTER: ICD-10-CM

## 2017-08-21 LAB
ALBUMIN SERPL BCP-MCNC: 3.2 G/DL (ref 3.2–4.9)
ALBUMIN/GLOB SERPL: 1 G/DL
ALP SERPL-CCNC: 121 U/L (ref 30–99)
ALT SERPL-CCNC: 9 U/L (ref 2–50)
ANION GAP SERPL CALC-SCNC: 11 MMOL/L (ref 0–11.9)
AST SERPL-CCNC: 14 U/L (ref 12–45)
BASOPHILS # BLD AUTO: 0.1 % (ref 0–1.8)
BASOPHILS # BLD: 0.01 K/UL (ref 0–0.12)
BILIRUB SERPL-MCNC: 0.7 MG/DL (ref 0.1–1.5)
BUN SERPL-MCNC: 5 MG/DL (ref 8–22)
CALCIUM SERPL-MCNC: 9.7 MG/DL (ref 8.5–10.5)
CHLORIDE SERPL-SCNC: 109 MMOL/L (ref 96–112)
CO2 SERPL-SCNC: 16 MMOL/L (ref 20–33)
CREAT SERPL-MCNC: 0.51 MG/DL (ref 0.5–1.4)
EOSINOPHIL # BLD AUTO: 0.05 K/UL (ref 0–0.51)
EOSINOPHIL NFR BLD: 0.5 % (ref 0–6.9)
ERYTHROCYTE [DISTWIDTH] IN BLOOD BY AUTOMATED COUNT: 45.8 FL (ref 35.9–50)
GFR SERPL CREATININE-BSD FRML MDRD: >60 ML/MIN/1.73 M 2
GLOBULIN SER CALC-MCNC: 3.2 G/DL (ref 1.9–3.5)
GLUCOSE SERPL-MCNC: 95 MG/DL (ref 65–99)
HCT VFR BLD AUTO: 37.6 % (ref 37–47)
HGB BLD-MCNC: 12.9 G/DL (ref 12–16)
IMM GRANULOCYTES # BLD AUTO: 0.09 K/UL (ref 0–0.11)
IMM GRANULOCYTES NFR BLD AUTO: 0.8 % (ref 0–0.9)
LYMPHOCYTES # BLD AUTO: 1.86 K/UL (ref 1–4.8)
LYMPHOCYTES NFR BLD: 16.9 % (ref 22–41)
MCH RBC QN AUTO: 31.3 PG (ref 27–33)
MCHC RBC AUTO-ENTMCNC: 34.3 G/DL (ref 33.6–35)
MCV RBC AUTO: 91.3 FL (ref 81.4–97.8)
MONOCYTES # BLD AUTO: 0.96 K/UL (ref 0–0.85)
MONOCYTES NFR BLD AUTO: 8.7 % (ref 0–13.4)
NEUTROPHILS # BLD AUTO: 8.05 K/UL (ref 2–7.15)
NEUTROPHILS NFR BLD: 73 % (ref 44–72)
NRBC # BLD AUTO: 0 K/UL
NRBC BLD AUTO-RTO: 0 /100 WBC
PLATELET # BLD AUTO: 258 K/UL (ref 164–446)
PMV BLD AUTO: 10.9 FL (ref 9–12.9)
POTASSIUM SERPL-SCNC: 3.8 MMOL/L (ref 3.6–5.5)
PROT SERPL-MCNC: 6.4 G/DL (ref 6–8.2)
RBC # BLD AUTO: 4.12 M/UL (ref 4.2–5.4)
SODIUM SERPL-SCNC: 136 MMOL/L (ref 135–145)
URATE SERPL-MCNC: 5.4 MG/DL (ref 1.9–8.2)
WBC # BLD AUTO: 11 K/UL (ref 4.8–10.8)

## 2017-08-21 PROCEDURE — 700111 HCHG RX REV CODE 636 W/ 250 OVERRIDE (IP): Performed by: OBSTETRICS & GYNECOLOGY

## 2017-08-21 PROCEDURE — 84550 ASSAY OF BLOOD/URIC ACID: CPT

## 2017-08-21 PROCEDURE — 85025 COMPLETE CBC W/AUTO DIFF WBC: CPT

## 2017-08-21 PROCEDURE — 700105 HCHG RX REV CODE 258

## 2017-08-21 PROCEDURE — 90040 PR PRENATAL FOLLOW UP: CPT | Performed by: OBSTETRICS & GYNECOLOGY

## 2017-08-21 PROCEDURE — 59025 FETAL NON-STRESS TEST: CPT | Performed by: OBSTETRICS & GYNECOLOGY

## 2017-08-21 PROCEDURE — 80053 COMPREHEN METABOLIC PANEL: CPT

## 2017-08-21 PROCEDURE — 96374 THER/PROPH/DIAG INJ IV PUSH: CPT

## 2017-08-21 RX ORDER — SODIUM CHLORIDE, SODIUM LACTATE, POTASSIUM CHLORIDE, CALCIUM CHLORIDE 600; 310; 30; 20 MG/100ML; MG/100ML; MG/100ML; MG/100ML
1000 INJECTION, SOLUTION INTRAVENOUS CONTINUOUS
Status: DISCONTINUED | OUTPATIENT
Start: 2017-08-21 | End: 2017-08-21 | Stop reason: HOSPADM

## 2017-08-21 RX ORDER — SODIUM CHLORIDE, SODIUM LACTATE, POTASSIUM CHLORIDE, CALCIUM CHLORIDE 600; 310; 30; 20 MG/100ML; MG/100ML; MG/100ML; MG/100ML
INJECTION, SOLUTION INTRAVENOUS
Status: COMPLETED
Start: 2017-08-21 | End: 2017-08-21

## 2017-08-21 RX ADMIN — SODIUM CHLORIDE, SODIUM LACTATE, POTASSIUM CHLORIDE, CALCIUM CHLORIDE 1000 ML: 600; 310; 30; 20 INJECTION, SOLUTION INTRAVENOUS at 09:55

## 2017-08-21 RX ADMIN — PROCHLORPERAZINE EDISYLATE 10 MG: 5 INJECTION INTRAMUSCULAR; INTRAVENOUS at 10:30

## 2017-08-21 RX ADMIN — SODIUM CHLORIDE, POTASSIUM CHLORIDE, SODIUM LACTATE AND CALCIUM CHLORIDE 1000 ML: 600; 310; 30; 20 INJECTION, SOLUTION INTRAVENOUS at 09:55

## 2017-08-21 NOTE — PROGRESS NOTES
Pt here for OB F/V. Good fetal movement. Denies  VB.  CO LOF, UC's every 10 mins for most of the weekend + migraine  IOL 8/31 9 am- pt given instructions

## 2017-08-21 NOTE — PROGRESS NOTES
1040) report received from JERROD Carrillo RN  1100) Pt trying to rest, pt states that the compazine made her feel agitated and that she wants to try to sleep it off.    Monitors adjusted  1140) Pt states that her agitation has subsided and her headache is gone.  POC discussed  1200) Lab results and BP's within normal limits.  Dr Ulloa updated, order received for discharge  1230) discharge instructions provided, pt comfortable with POC.  Reviewed signs/symptoms of preeclampsia.  Pt stable discharged home.

## 2017-08-21 NOTE — MR AVS SNAPSHOT
Murray Chavez   2017 9:00 AM   Routine Prenatal   MRN: [id number]    [facility]   Dept Phone:  [phone]    Description:  Female : 1985   Provider:  Barbra Ulloa M.D.           Allergies as of 2017     No Known Allergies      You were diagnosed with     Supervision of normal intrauterine pregnancy in multigravida in third trimester   [[id number]]       Gestational hypertension without significant proteinuria in third trimester   [[id number]]         Vital Signs     Blood Pressure Weight Smoking Status             120/62 mmHg 106.595 kg (235 lb) Never Smoker          Basic Information     Date Of Birth Sex Race Ethnicity Preferred Language    1985 Female White Non- English      Your appointments     Aug 31, 2017  9:00 AM   Induction Of Labor with NON-SURGICAL L&D   LAB[facility] & DELIVERY [facility] (--)    [address] [place] [id number] [phone]              Problem List              ICD-10-CM Priority Class Noted - Resolved    Migraines G43.909   2017 - Present    Gestational hypertension without significant proteinuria O13.9   2017 - Present    Supervision of normal intrauterine pregnancy in multigravida in third trimester Z34.83   8/3/2017 - Present      Health Maintenance        Date Due Completion Dates    IMM INFLUENZA (1) 2017 ---    PAP SMEAR 3/17/2020 3/17/2017    IMM DTaP/Tdap/Td Vaccine (2 - Td) 7/10/2027 7/10/2017            Current Immunizations     Tdap Vaccine 7/10/2017  9:45 AM    Tuberculin Skin Test 10/29/2013  4:25 PM      Below and/or attached are the medications your provider expects you to take. Review all of your home medications and newly ordered medications with your provider and/or pharmacist. Follow medication instructions as directed by your provider and/or pharmacist. Please keep your medication list with you and share with your provider. Update the information when medications are discontinued, doses are  changed, or new medications (including over-the-counter products) are added; and carry medication information at all times in the event of emergency situations     Allergies:  No Known Allergies          Medications  Valid as of: August 21, 2017 -  9:15 AM    Generic Name Brand Name Tablet Size Instructions for use    Butalbital-APAP-Caffeine (Tab) FIORICET -40 MG Take 1 Tab by mouth every four hours as needed for Headache.        Omeprazole (CAPSULE DELAYED RELEASE) PRILOSEC 20 MG Take 1 Cap by mouth every day.        Prenatal MV-Min-Fe Fum-FA-DHA   Take  by mouth.        .                 Medicines prescribed today were sent to:     [facility]/PHARMACY #[id number] - [place], [place] - [address] [facility]    [address] [address]    Phone: [phone] Fax: [phone]    Open 24 Hours?: No      Medication refill instructions:       If your prescription bottle indicates you have medication refills left, it is not necessary to call your provider’s office. Please contact your pharmacy and they will refill your medication.    If your prescription bottle indicates you do not have any refills left, you may request refills at any time through one of the following ways: The online [employer] system (except Urgent Care), by calling your provider’s office, or by asking your pharmacy to contact your provider’s office with a refill request. Medication refills are processed only during regular business hours and may not be available until the next business day. Your provider may request additional information or to have a follow-up visit with you prior to refilling your medication.   *Please Note: Medication refills are assigned a new Rx number when refilled electronically. Your pharmacy may indicate that no refills were authorized even though a new prescription for the same medication is available at the pharmacy. Please request the medicine by name with the pharmacy before contacting your provider for a refill.           [employer]  Access Code: Activation code not generated  Current MyChart Status: Active

## 2017-08-21 NOTE — PROGRESS NOTES
0940  edc 9-3-17 38.1 weeks was sent over from [facility] for headache since Friday evening.  Pt states she has a history of migraines.  Pt denies any visual disturbances or epigastric pain.  Reflexes 2+ bilaterally on lower extremities.  No clonus noted.      Dr. Ulloa updated on blood pressure reading.  Orders received for LR, compazine, and preeclampsia labs.      1040 Report to Sangeeta Marr RN.  POC discussed.

## 2017-08-28 ENCOUNTER — HOSPITAL ENCOUNTER (OUTPATIENT)
Facility: MEDICAL CENTER | Age: 32
End: 2017-08-28
Attending: OBSTETRICS & GYNECOLOGY | Admitting: OBSTETRICS & GYNECOLOGY
Payer: COMMERCIAL

## 2017-08-28 VITALS — SYSTOLIC BLOOD PRESSURE: 124 MMHG | DIASTOLIC BLOOD PRESSURE: 82 MMHG | TEMPERATURE: 97.8 F | HEART RATE: 110 BPM

## 2017-08-28 PROCEDURE — 59025 FETAL NON-STRESS TEST: CPT | Performed by: NURSE PRACTITIONER

## 2017-08-29 NOTE — PROGRESS NOTES
EDC 9/3 39w1d. Pt presents to L&D with complaints of UC starting around 1500. PT taken to triage for assessment.      TOCO and EFM applied, VSS. PT reports +FM, denies LOF or VB. PT stated she has had increased discharge continuously over the last month but stated no gushes or continual leaking.      Prior to SVE, no fluid noted. SVE 2/thick/-3. Will update Dr. Blanca on pt status.      Dr. Blanca updated on pt status, orders received to discharge pt home.      Pt educated on labor precautions and when to return. Pt has a scheduled induction for Thursday.      Pt discharged home in stable condition

## 2017-08-31 ENCOUNTER — HOSPITAL ENCOUNTER (INPATIENT)
Facility: MEDICAL CENTER | Age: 32
LOS: 4 days | End: 2017-09-04
Attending: OBSTETRICS & GYNECOLOGY | Admitting: OBSTETRICS & GYNECOLOGY
Payer: COMMERCIAL

## 2017-08-31 LAB
ALBUMIN SERPL BCP-MCNC: 3.1 G/DL (ref 3.2–4.9)
ALBUMIN/GLOB SERPL: 1.1 G/DL
ALP SERPL-CCNC: 110 U/L (ref 30–99)
ALT SERPL-CCNC: 7 U/L (ref 2–50)
ANION GAP SERPL CALC-SCNC: 9 MMOL/L (ref 0–11.9)
AST SERPL-CCNC: 14 U/L (ref 12–45)
BASOPHILS # BLD AUTO: 0.3 % (ref 0–1.8)
BASOPHILS # BLD: 0.03 K/UL (ref 0–0.12)
BILIRUB SERPL-MCNC: 0.7 MG/DL (ref 0.1–1.5)
BUN SERPL-MCNC: 4 MG/DL (ref 8–22)
CALCIUM SERPL-MCNC: 9.6 MG/DL (ref 8.5–10.5)
CHLORIDE SERPL-SCNC: 109 MMOL/L (ref 96–112)
CO2 SERPL-SCNC: 18 MMOL/L (ref 20–33)
CREAT SERPL-MCNC: 0.5 MG/DL (ref 0.5–1.4)
EOSINOPHIL # BLD AUTO: 0.06 K/UL (ref 0–0.51)
EOSINOPHIL NFR BLD: 0.5 % (ref 0–6.9)
ERYTHROCYTE [DISTWIDTH] IN BLOOD BY AUTOMATED COUNT: 45.6 FL (ref 35.9–50)
GFR SERPL CREATININE-BSD FRML MDRD: >60 ML/MIN/1.73 M 2
GLOBULIN SER CALC-MCNC: 2.9 G/DL (ref 1.9–3.5)
GLUCOSE SERPL-MCNC: 75 MG/DL (ref 65–99)
HCT VFR BLD AUTO: 35.6 % (ref 37–47)
HGB BLD-MCNC: 12.3 G/DL (ref 12–16)
HOLDING TUBE BB 8507: NORMAL
IMM GRANULOCYTES # BLD AUTO: 0.09 K/UL (ref 0–0.11)
IMM GRANULOCYTES NFR BLD AUTO: 0.8 % (ref 0–0.9)
LYMPHOCYTES # BLD AUTO: 1.78 K/UL (ref 1–4.8)
LYMPHOCYTES NFR BLD: 16 % (ref 22–41)
MCH RBC QN AUTO: 31.5 PG (ref 27–33)
MCHC RBC AUTO-ENTMCNC: 34.6 G/DL (ref 33.6–35)
MCV RBC AUTO: 91.3 FL (ref 81.4–97.8)
MONOCYTES # BLD AUTO: 0.81 K/UL (ref 0–0.85)
MONOCYTES NFR BLD AUTO: 7.3 % (ref 0–13.4)
NEUTROPHILS # BLD AUTO: 8.36 K/UL (ref 2–7.15)
NEUTROPHILS NFR BLD: 75.1 % (ref 44–72)
NRBC # BLD AUTO: 0 K/UL
NRBC BLD AUTO-RTO: 0 /100 WBC
PLATELET # BLD AUTO: 228 K/UL (ref 164–446)
PMV BLD AUTO: 11 FL (ref 9–12.9)
POTASSIUM SERPL-SCNC: 3.9 MMOL/L (ref 3.6–5.5)
PROT SERPL-MCNC: 6 G/DL (ref 6–8.2)
RBC # BLD AUTO: 3.9 M/UL (ref 4.2–5.4)
SODIUM SERPL-SCNC: 136 MMOL/L (ref 135–145)
URATE SERPL-MCNC: 5.4 MG/DL (ref 1.9–8.2)
WBC # BLD AUTO: 11.1 K/UL (ref 4.8–10.8)

## 2017-08-31 PROCEDURE — A9270 NON-COVERED ITEM OR SERVICE: HCPCS | Performed by: OBSTETRICS & GYNECOLOGY

## 2017-08-31 PROCEDURE — 3E0P7GC INTRODUCTION OF OTHER THERAPEUTIC SUBSTANCE INTO FEMALE REPRODUCTIVE, VIA NATURAL OR ARTIFICIAL OPENING: ICD-10-PCS | Performed by: OBSTETRICS & GYNECOLOGY

## 2017-08-31 PROCEDURE — 700105 HCHG RX REV CODE 258: Performed by: OBSTETRICS & GYNECOLOGY

## 2017-08-31 PROCEDURE — 3E033VJ INTRODUCTION OF OTHER HORMONE INTO PERIPHERAL VEIN, PERCUTANEOUS APPROACH: ICD-10-PCS | Performed by: OBSTETRICS & GYNECOLOGY

## 2017-08-31 PROCEDURE — 770002 HCHG ROOM/CARE - OB PRIVATE (112)

## 2017-08-31 PROCEDURE — 700101 HCHG RX REV CODE 250: Mod: JW

## 2017-08-31 PROCEDURE — 85025 COMPLETE CBC W/AUTO DIFF WBC: CPT

## 2017-08-31 PROCEDURE — 84550 ASSAY OF BLOOD/URIC ACID: CPT

## 2017-08-31 PROCEDURE — 80053 COMPREHEN METABOLIC PANEL: CPT

## 2017-08-31 PROCEDURE — 4A1HXCZ MONITORING OF PRODUCTS OF CONCEPTION, CARDIAC RATE, EXTERNAL APPROACH: ICD-10-PCS | Performed by: OBSTETRICS & GYNECOLOGY

## 2017-08-31 PROCEDURE — 700102 HCHG RX REV CODE 250 W/ 637 OVERRIDE(OP): Performed by: OBSTETRICS & GYNECOLOGY

## 2017-08-31 PROCEDURE — 700111 HCHG RX REV CODE 636 W/ 250 OVERRIDE (IP): Performed by: OBSTETRICS & GYNECOLOGY

## 2017-08-31 PROCEDURE — 36415 COLL VENOUS BLD VENIPUNCTURE: CPT

## 2017-08-31 PROCEDURE — 700111 HCHG RX REV CODE 636 W/ 250 OVERRIDE (IP)

## 2017-08-31 RX ORDER — CITRIC ACID/SODIUM CITRATE 334-500MG
30 SOLUTION, ORAL ORAL EVERY 6 HOURS PRN
Status: DISCONTINUED | OUTPATIENT
Start: 2017-08-31 | End: 2017-09-01 | Stop reason: HOSPADM

## 2017-08-31 RX ORDER — ACETAMINOPHEN 325 MG/1
325 TABLET ORAL EVERY 4 HOURS PRN
Status: CANCELLED | OUTPATIENT
Start: 2017-08-31

## 2017-08-31 RX ORDER — GABAPENTIN 300 MG/1
300-600 CAPSULE ORAL 2 TIMES DAILY PRN
COMMUNITY

## 2017-08-31 RX ORDER — SODIUM CHLORIDE, SODIUM LACTATE, POTASSIUM CHLORIDE, CALCIUM CHLORIDE 600; 310; 30; 20 MG/100ML; MG/100ML; MG/100ML; MG/100ML
INJECTION, SOLUTION INTRAVENOUS CONTINUOUS
Status: DISPENSED | OUTPATIENT
Start: 2017-08-31 | End: 2017-08-31

## 2017-08-31 RX ORDER — SODIUM CHLORIDE, SODIUM LACTATE, POTASSIUM CHLORIDE, CALCIUM CHLORIDE 600; 310; 30; 20 MG/100ML; MG/100ML; MG/100ML; MG/100ML
INJECTION, SOLUTION INTRAVENOUS
Status: ACTIVE
Start: 2017-08-31 | End: 2017-08-31

## 2017-08-31 RX ORDER — ALUMINA, MAGNESIA, AND SIMETHICONE 2400; 2400; 240 MG/30ML; MG/30ML; MG/30ML
30 SUSPENSION ORAL EVERY 6 HOURS PRN
Status: DISCONTINUED | OUTPATIENT
Start: 2017-08-31 | End: 2017-09-01 | Stop reason: HOSPADM

## 2017-08-31 RX ORDER — HYDROXYZINE 50 MG/1
50 TABLET, FILM COATED ORAL EVERY 6 HOURS PRN
Status: DISCONTINUED | OUTPATIENT
Start: 2017-08-31 | End: 2017-09-01 | Stop reason: HOSPADM

## 2017-08-31 RX ORDER — IBUPROFEN 800 MG/1
800 TABLET ORAL EVERY 8 HOURS PRN
Status: CANCELLED | OUTPATIENT
Start: 2017-08-31

## 2017-08-31 RX ORDER — GABAPENTIN 300 MG/1
300 CAPSULE ORAL DAILY
Status: DISCONTINUED | OUTPATIENT
Start: 2017-09-01 | End: 2017-09-04 | Stop reason: HOSPADM

## 2017-08-31 RX ORDER — OXYCODONE HYDROCHLORIDE 5 MG/1
5 TABLET ORAL EVERY 4 HOURS PRN
Status: CANCELLED | OUTPATIENT
Start: 2017-08-31

## 2017-08-31 RX ORDER — OXYCODONE HYDROCHLORIDE 10 MG/1
10 TABLET ORAL EVERY 4 HOURS PRN
Status: CANCELLED | OUTPATIENT
Start: 2017-08-31

## 2017-08-31 RX ADMIN — SODIUM CHLORIDE, POTASSIUM CHLORIDE, SODIUM LACTATE AND CALCIUM CHLORIDE: 600; 310; 30; 20 INJECTION, SOLUTION INTRAVENOUS at 14:31

## 2017-08-31 RX ADMIN — MISOPROSTOL 25 MCG: 100 TABLET ORAL at 10:02

## 2017-08-31 RX ADMIN — SODIUM CHLORIDE, POTASSIUM CHLORIDE, SODIUM LACTATE AND CALCIUM CHLORIDE: 600; 310; 30; 20 INJECTION, SOLUTION INTRAVENOUS at 18:19

## 2017-08-31 RX ADMIN — SODIUM CHLORIDE, POTASSIUM CHLORIDE, SODIUM LACTATE AND CALCIUM CHLORIDE 1000 ML: 600; 310; 30; 20 INJECTION, SOLUTION INTRAVENOUS at 09:45

## 2017-08-31 RX ADMIN — Medication 1 MILLI-UNITS/MIN: at 14:30

## 2017-08-31 ASSESSMENT — PATIENT HEALTH QUESTIONNAIRE - PHQ9
1. LITTLE INTEREST OR PLEASURE IN DOING THINGS: NOT AT ALL
SUM OF ALL RESPONSES TO PHQ QUESTIONS 1-9: 0
SUM OF ALL RESPONSES TO PHQ9 QUESTIONS 1 AND 2: 0
2. FEELING DOWN, DEPRESSED, IRRITABLE, OR HOPELESS: NOT AT ALL

## 2017-08-31 ASSESSMENT — LIFESTYLE VARIABLES
EVER_SMOKED: YES
DO YOU DRINK ALCOHOL: NO
ALCOHOL_USE: NO

## 2017-08-31 NOTE — CARE PLAN
Problem: Knowledge Deficit  Goal: Patient/Support person demonstrates understanding regarding the progression of labor, available options and participates in decision-making process    Intervention: Assess patient's preparation, knowledge level, and expectations/birth plan  Pt would like to not have anything for pain and will let me know if she changes her mind  Intervention: Instruct patient/support person in basic interpretation of fetal monitor  Pt and FOB in basic interpretation of fetal monitors, verbalized understanding

## 2017-08-31 NOTE — PROGRESS NOTES
0920-pt presents from home for elective IOL, no c/o leaking, bleeding, pain or uc's, states baby is moving normally, placed on external monitors, vs taken,   1000-Dr Ulloa in room, SVE 1/thick/high, cytotec ordered, pt and family educated, verbalized understanding,  1010-cytotec placed per orders  1105-up to walk  1210-back on monitors,   1320-up to walk\  1410-back on monitors, SVE 2-3/50/-3, report given to Dr Ulloa, wants pitocin started  1430-pitocin started per orders, pt and family educated, verbalized understanding  1900-report given to MARK Reveles RN, POC discussed

## 2017-08-31 NOTE — H&P
History and Physical      Murray Chavez is a 31 y.o. year old female  at 39w4d who presents for elective IOL.    Subjective:   positive  For CTXS.   negative Feels pain   negative for LOF  negative for vaginal bleeding.   positive for fetal movement    ROS: Pertinent items are noted in HPI.    Past Medical History:   Diagnosis Date   • HPV in female     DX    • Migraine    • Urinary tract infection, site not specified     bladder infections     No past surgical history on file.  OB History    Para Term  AB Living   3 2 2     2   SAB TAB Ectopic Molar Multiple Live Births             2      # Outcome Date GA Lbr Severo/2nd Weight Sex Delivery Anes PTL Lv   3 Current            2 Term 11 40w0d  3.714 kg (8 lb 3 oz) M Vag-Spont None N SHAMIR   1 Term 10/06/07 40w0d  2.892 kg (6 lb 6 oz) F Vag-Spont EPI N SHAMIR        Social History     Social History   • Marital status:      Spouse name: N/A   • Number of children: N/A   • Years of education: N/A     Occupational History   • Not on file.     Social History Main Topics   • Smoking status: Never Smoker   • Smokeless tobacco: Not on file   • Alcohol use No   • Drug use: No   • Sexual activity: Yes     Partners: Male     Other Topics Concern   • Not on file     Social History Narrative   • No narrative on file     Allergies: Review of patient's allergies indicates no known allergies.    Current Facility-Administered Medications:   •  LACTATED RINGERS IV SOLN, , , ,     Prenatal care with [facility] starting at 15 wks with following problems:  Patient Active Problem List    Diagnosis Date Noted   • Supervision of normal intrauterine pregnancy in multigravida in third trimester 2017   • Migraines 2017   • Gestational hypertension without significant proteinuria 2017               Objective:      There were no vitals taken for this visit.    General:   no acute distress   Skin:   normal   HEENT:  PERRLA   Lungs:   CTA bilateral    Heart:   S1, S2 normal, no murmur, click, rub or gallop, regular rate and rhythm, brisk carotid upstroke without bruits, peripheral pulses very brisk, chest is clear without rales or wheezing, no pedal edema, no JVD, no hepatosplenomegaly   Abdomen:   gravid, NT   EFW:  3600 grams   Pelvis:  adequate with gynecoid pelvis   FHT:  140s BPM, moderate variability, + accels   Uterine Size: S=D   Presentations: Cephalic   Cervix:     Dilation: 1cm    Effacement: Long    Station:  Floating    Consistency: Soft    Position: Posterior     Lab Review  Lab:   Blood type: A+     Recent Results (from the past 5880 hour(s))   POCT US - In Clinic OB Scan    Collection Time: 02/13/17  9:43 AM   Result Value Ref Range    In Clinic OB Scan     THINPREP RFLX HPV ASCUS W/CTNG    Collection Time: 03/17/17  9:07 AM   Result Value Ref Range    Cytology Reg See Path Report     Source Cervical     C. trachomatis by PCR Negative Negative    N. gonorrhoeae by PCR Negative Negative   BUN    Collection Time: 04/06/17 12:00 AM   Result Value Ref Range    Bun 7    CREATININE    Collection Time: 04/06/17 12:00 AM   Result Value Ref Range    Creatinine 0.6    PLATELET COUNT    Collection Time: 04/06/17 12:00 AM   Result Value Ref Range    Platelet Count 303    RUBELLA ABS IGG    Collection Time: 04/06/17 12:00 AM   Result Value Ref Range    Rubella IgG Antibody IMMUNE    HCT    Collection Time: 04/06/17 12:00 AM   Result Value Ref Range    Hematocrit 36.1    HGB    Collection Time: 04/06/17 12:00 AM   Result Value Ref Range    Hemoglobin 12.5    HIV ANTIBODIES    Collection Time: 04/06/17 12:00 AM   Result Value Ref Range    HIV 1,0,2 IC NON REACTIVE    HEP B SURFACE ANTIGEN    Collection Time: 04/06/17 12:00 AM   Result Value Ref Range    Hepatitis B Surface Antigen NON REACTIVE    PROTEIN/CREAT RATIO URINE    Collection Time: 06/20/17 10:50 AM   Result Value Ref Range    Total Protein, Urine 4.2 0.0 - 15.0 mg/dL    Creatinine, Random Urine  48.30 mg/dL    Protein Creatinine Ratio 87 10 - 107 mg/g   POC UA    Collection Time: 06/20/17 11:15 AM   Result Value Ref Range    POC Color Yellow     POC Appearance Clear     POC Glucose Negative Negative mg/dL    POC Ketones Negative Negative mg/dL    POC Specific Gravity 1.010 1.005 - 1.030    POC Blood Negative Negative    POC Urine PH 5.5 5.0 - 8.0    POC Protein Negative Negative mg/dL    POC Nitrites Negative Negative    POC Leukocyte Esterase Negative Negative   CBC WITH DIFFERENTIAL    Collection Time: 06/20/17 11:55 AM   Result Value Ref Range    WBC 13.3 (H) 4.8 - 10.8 K/uL    RBC 3.86 (L) 4.20 - 5.40 M/uL    Hemoglobin 12.0 12.0 - 16.0 g/dL    Hematocrit 35.2 (L) 37.0 - 47.0 %    MCV 91.2 81.4 - 97.8 fL    MCH 31.1 27.0 - 33.0 pg    MCHC 34.1 33.6 - 35.0 g/dL    RDW 42.8 35.9 - 50.0 fL    Platelet Count 287 164 - 446 K/uL    MPV 10.2 9.0 - 12.9 fL    Neutrophils-Polys 74.40 (H) 44.00 - 72.00 %    Lymphocytes 16.90 (L) 22.00 - 41.00 %    Monocytes 6.30 0.00 - 13.40 %    Eosinophils 0.50 0.00 - 6.90 %    Basophils 0.50 0.00 - 1.80 %    Immature Granulocytes 1.40 (H) 0.00 - 0.90 %    Nucleated RBC 0.00 /100 WBC    Neutrophils (Absolute) 9.88 (H) 2.00 - 7.15 K/uL    Lymphs (Absolute) 2.24 1.00 - 4.80 K/uL    Monos (Absolute) 0.84 0.00 - 0.85 K/uL    Eos (Absolute) 0.06 0.00 - 0.51 K/uL    Baso (Absolute) 0.06 0.00 - 0.12 K/uL    Immature Granulocytes (abs) 0.18 (H) 0.00 - 0.11 K/uL    NRBC (Absolute) 0.00 K/uL   COMP METABOLIC PANEL    Collection Time: 06/20/17 11:55 AM   Result Value Ref Range    Sodium 137 135 - 145 mmol/L    Potassium 3.6 3.6 - 5.5 mmol/L    Chloride 109 96 - 112 mmol/L    Co2 18 (L) 20 - 33 mmol/L    Anion Gap 10.0 0.0 - 11.9    Glucose 109 (H) 65 - 99 mg/dL    Bun 6 (L) 8 - 22 mg/dL    Creatinine 0.54 0.50 - 1.40 mg/dL    Calcium 9.2 8.5 - 10.5 mg/dL    AST(SGOT) 12 12 - 45 U/L    ALT(SGPT) 10 2 - 50 U/L    Alkaline Phosphatase 82 30 - 99 U/L    Total Bilirubin 0.4 0.1 - 1.5 mg/dL     Albumin 3.3 3.2 - 4.9 g/dL    Total Protein 6.3 6.0 - 8.2 g/dL    Globulin 3.0 1.9 - 3.5 g/dL    A-G Ratio 1.1 g/dL   URIC ACID    Collection Time: 06/20/17 11:55 AM   Result Value Ref Range    Uric Acid 4.5 1.9 - 8.2 mg/dL   ESTIMATED GFR    Collection Time: 06/20/17 11:55 AM   Result Value Ref Range    GFR If African American >60 >60 mL/min/1.73 m 2    GFR If Non African American >60 >60 mL/min/1.73 m 2   GLUCOSE 1HR GESTATIONAL    Collection Time: 07/07/17 10:36 AM   Result Value Ref Range    Glucose, Post Dose 96 70 - 139 mg/dL   CBC WITH DIFFERENTIAL    Collection Time: 07/07/17 10:36 AM   Result Value Ref Range    WBC 16.3 (H) 4.8 - 10.8 K/uL    RBC 4.25 4.20 - 5.40 M/uL    Hemoglobin 13.3 12.0 - 16.0 g/dL    Hematocrit 40.7 37.0 - 47.0 %    MCV 95.8 81.4 - 97.8 fL    MCH 31.3 27.0 - 33.0 pg    MCHC 32.7 (L) 33.6 - 35.0 g/dL    RDW 46.5 35.9 - 50.0 fL    Platelet Count 312 164 - 446 K/uL    MPV 11.0 9.0 - 12.9 fL    Neutrophils-Polys 77.90 (H) 44.00 - 72.00 %    Lymphocytes 13.90 (L) 22.00 - 41.00 %    Monocytes 6.50 0.00 - 13.40 %    Eosinophils 0.50 0.00 - 6.90 %    Basophils 0.20 0.00 - 1.80 %    Immature Granulocytes 1.00 (H) 0.00 - 0.90 %    Nucleated RBC 0.00 /100 WBC    Neutrophils (Absolute) 12.70 (H) 2.00 - 7.15 K/uL    Lymphs (Absolute) 2.27 1.00 - 4.80 K/uL    Monos (Absolute) 1.06 (H) 0.00 - 0.85 K/uL    Eos (Absolute) 0.08 0.00 - 0.51 K/uL    Baso (Absolute) 0.04 0.00 - 0.12 K/uL    Immature Granulocytes (abs) 0.17 (H) 0.00 - 0.11 K/uL    NRBC (Absolute) 0.00 K/uL   T.PALLIDUM AB EIA    Collection Time: 07/07/17 10:36 AM   Result Value Ref Range    Syphilis, Treponemal Qual Non Reactive Non Reactive   GRP B STREP, BY PCR (GOODEN BROTH)    Collection Time: 08/03/17  9:00 AM   Result Value Ref Range    Strep Gp B DNA PCR Negative Negative   FERN TEST    Collection Time: 08/10/17  8:30 PM   Result Value Ref Range    Fern Test On Amniotic Fluid see below Not present   POC UA    Collection Time: 08/10/17   8:34 PM   Result Value Ref Range    POC Color Yellow     POC Appearance Clear     POC Glucose Negative Negative mg/dL    POC Ketones Trace (A) Negative mg/dL    POC Specific Gravity 1.015 1.005 - 1.030    POC Blood Negative Negative    POC Urine PH 6.0 5.0 - 8.0    POC Protein Negative Negative mg/dL    POC Nitrites Negative Negative    POC Leukocyte Esterase Negative Negative   FERN TEST    Collection Time: 08/15/17  4:40 PM   Result Value Ref Range    Fern Test On Amniotic Fluid see below Not present   CBC WITH DIFFERENTIAL    Collection Time: 08/21/17 10:00 AM   Result Value Ref Range    WBC 11.0 (H) 4.8 - 10.8 K/uL    RBC 4.12 (L) 4.20 - 5.40 M/uL    Hemoglobin 12.9 12.0 - 16.0 g/dL    Hematocrit 37.6 37.0 - 47.0 %    MCV 91.3 81.4 - 97.8 fL    MCH 31.3 27.0 - 33.0 pg    MCHC 34.3 33.6 - 35.0 g/dL    RDW 45.8 35.9 - 50.0 fL    Platelet Count 258 164 - 446 K/uL    MPV 10.9 9.0 - 12.9 fL    Neutrophils-Polys 73.00 (H) 44.00 - 72.00 %    Lymphocytes 16.90 (L) 22.00 - 41.00 %    Monocytes 8.70 0.00 - 13.40 %    Eosinophils 0.50 0.00 - 6.90 %    Basophils 0.10 0.00 - 1.80 %    Immature Granulocytes 0.80 0.00 - 0.90 %    Nucleated RBC 0.00 /100 WBC    Neutrophils (Absolute) 8.05 (H) 2.00 - 7.15 K/uL    Lymphs (Absolute) 1.86 1.00 - 4.80 K/uL    Monos (Absolute) 0.96 (H) 0.00 - 0.85 K/uL    Eos (Absolute) 0.05 0.00 - 0.51 K/uL    Baso (Absolute) 0.01 0.00 - 0.12 K/uL    Immature Granulocytes (abs) 0.09 0.00 - 0.11 K/uL    NRBC (Absolute) 0.00 K/uL   COMP METABOLIC PANEL    Collection Time: 08/21/17 10:00 AM   Result Value Ref Range    Sodium 136 135 - 145 mmol/L    Potassium 3.8 3.6 - 5.5 mmol/L    Chloride 109 96 - 112 mmol/L    Co2 16 (L) 20 - 33 mmol/L    Anion Gap 11.0 0.0 - 11.9    Glucose 95 65 - 99 mg/dL    Bun 5 (L) 8 - 22 mg/dL    Creatinine 0.51 0.50 - 1.40 mg/dL    Calcium 9.7 8.5 - 10.5 mg/dL    AST(SGOT) 14 12 - 45 U/L    ALT(SGPT) 9 2 - 50 U/L    Alkaline Phosphatase 121 (H) 30 - 99 U/L    Total Bilirubin  0.7 0.1 - 1.5 mg/dL    Albumin 3.2 3.2 - 4.9 g/dL    Total Protein 6.4 6.0 - 8.2 g/dL    Globulin 3.2 1.9 - 3.5 g/dL    A-G Ratio 1.0 g/dL   URIC ACID    Collection Time: 17 10:00 AM   Result Value Ref Range    Uric Acid 5.4 1.9 - 8.2 mg/dL   ESTIMATED GFR    Collection Time: 17 10:00 AM   Result Value Ref Range    GFR If African American >60 >60 mL/min/1.73 m 2    GFR If Non African American >60 >60 mL/min/1.73 m 2        Assessment:   Murray Chavez at 39w4d  Labor status: not in labor.  Requests elective IOL due to multiple somatic complaints.  Fetal status reassuring.  Cervix unfavorable. Pt understands she is at higher risk of prolonged labor, infection, and C/S due to elective IOL with unfavorable cervix.  She wishes to proceed with IOL.     Obstetrical history significant for   Patient Active Problem List    Diagnosis Date Noted   • Supervision of normal intrauterine pregnancy in multigravida in third trimester 2017   • Migraines 2017   • Gestational hypertension without significant proteinuria 2017   .      Plan:     Admit to L&D  GBS negative  Cytotec for cervical ripening.  Pitocin when favorable.   Anticipate .

## 2017-09-01 LAB
ERYTHROCYTE [DISTWIDTH] IN BLOOD BY AUTOMATED COUNT: 46.1 FL (ref 35.9–50)
HCT VFR BLD AUTO: 32.9 % (ref 37–47)
HGB BLD-MCNC: 11.2 G/DL (ref 12–16)
MCH RBC QN AUTO: 31.5 PG (ref 27–33)
MCHC RBC AUTO-ENTMCNC: 34 G/DL (ref 33.6–35)
MCV RBC AUTO: 92.4 FL (ref 81.4–97.8)
PLATELET # BLD AUTO: 208 K/UL (ref 164–446)
PMV BLD AUTO: 11 FL (ref 9–12.9)
RBC # BLD AUTO: 3.56 M/UL (ref 4.2–5.4)
WBC # BLD AUTO: 16.5 K/UL (ref 4.8–10.8)

## 2017-09-01 PROCEDURE — 770002 HCHG ROOM/CARE - OB PRIVATE (112)

## 2017-09-01 PROCEDURE — 700102 HCHG RX REV CODE 250 W/ 637 OVERRIDE(OP)

## 2017-09-01 PROCEDURE — 700111 HCHG RX REV CODE 636 W/ 250 OVERRIDE (IP)

## 2017-09-01 PROCEDURE — 700105 HCHG RX REV CODE 258

## 2017-09-01 PROCEDURE — 700101 HCHG RX REV CODE 250

## 2017-09-01 PROCEDURE — 306828 HCHG ANES-TIME GENERAL: Performed by: OBSTETRICS & GYNECOLOGY

## 2017-09-01 PROCEDURE — 59514 CESAREAN DELIVERY ONLY: CPT

## 2017-09-01 PROCEDURE — 85027 COMPLETE CBC AUTOMATED: CPT

## 2017-09-01 PROCEDURE — A9270 NON-COVERED ITEM OR SERVICE: HCPCS | Performed by: OBSTETRICS & GYNECOLOGY

## 2017-09-01 PROCEDURE — 304964 HCHG RECOVERY ROOM TIME 1HR

## 2017-09-01 PROCEDURE — 700102 HCHG RX REV CODE 250 W/ 637 OVERRIDE(OP): Performed by: STUDENT IN AN ORGANIZED HEALTH CARE EDUCATION/TRAINING PROGRAM

## 2017-09-01 PROCEDURE — A9270 NON-COVERED ITEM OR SERVICE: HCPCS | Performed by: ANESTHESIOLOGY

## 2017-09-01 PROCEDURE — 303615 HCHG EPIDURAL/SPINAL ANESTHESIA FOR LABOR

## 2017-09-01 PROCEDURE — A9270 NON-COVERED ITEM OR SERVICE: HCPCS | Performed by: STUDENT IN AN ORGANIZED HEALTH CARE EDUCATION/TRAINING PROGRAM

## 2017-09-01 PROCEDURE — 305385 HCHG SURGICAL SERVICES 1/4 HOUR

## 2017-09-01 PROCEDURE — A9270 NON-COVERED ITEM OR SERVICE: HCPCS

## 2017-09-01 PROCEDURE — 700102 HCHG RX REV CODE 250 W/ 637 OVERRIDE(OP): Performed by: ANESTHESIOLOGY

## 2017-09-01 PROCEDURE — 36415 COLL VENOUS BLD VENIPUNCTURE: CPT

## 2017-09-01 PROCEDURE — 700112 HCHG RX REV CODE 229: Performed by: OBSTETRICS & GYNECOLOGY

## 2017-09-01 PROCEDURE — 700102 HCHG RX REV CODE 250 W/ 637 OVERRIDE(OP): Performed by: OBSTETRICS & GYNECOLOGY

## 2017-09-01 RX ORDER — OMEPRAZOLE 20 MG/1
20 CAPSULE, DELAYED RELEASE ORAL DAILY
Status: DISCONTINUED | OUTPATIENT
Start: 2017-09-01 | End: 2017-09-04 | Stop reason: HOSPADM

## 2017-09-01 RX ORDER — CITRIC ACID/SODIUM CITRATE 334-500MG
30 SOLUTION, ORAL ORAL ONCE
Status: COMPLETED | OUTPATIENT
Start: 2017-09-01 | End: 2017-09-01

## 2017-09-01 RX ORDER — SODIUM CHLORIDE, SODIUM LACTATE, POTASSIUM CHLORIDE, CALCIUM CHLORIDE 600; 310; 30; 20 MG/100ML; MG/100ML; MG/100ML; MG/100ML
INJECTION, SOLUTION INTRAVENOUS
Status: COMPLETED
Start: 2017-09-01 | End: 2017-09-01

## 2017-09-01 RX ORDER — VITAMIN A ACETATE, BETA CAROTENE, ASCORBIC ACID, CHOLECALCIFEROL, .ALPHA.-TOCOPHEROL ACETATE, DL-, THIAMINE MONONITRATE, RIBOFLAVIN, NIACINAMIDE, PYRIDOXINE HYDROCHLORIDE, FOLIC ACID, CYANOCOBALAMIN, CALCIUM CARBONATE, FERROUS FUMARATE, ZINC OXIDE, CUPRIC OXIDE 3080; 12; 120; 400; 1; 1.84; 3; 20; 22; 920; 25; 200; 27; 10; 2 [IU]/1; UG/1; MG/1; [IU]/1; MG/1; MG/1; MG/1; MG/1; MG/1; [IU]/1; MG/1; MG/1; MG/1; MG/1; MG/1
1 TABLET, FILM COATED ORAL EVERY MORNING
Status: DISCONTINUED | OUTPATIENT
Start: 2017-09-01 | End: 2017-09-04 | Stop reason: HOSPADM

## 2017-09-01 RX ORDER — METOCLOPRAMIDE HYDROCHLORIDE 5 MG/ML
INJECTION INTRAMUSCULAR; INTRAVENOUS
Status: ACTIVE
Start: 2017-09-01 | End: 2017-09-01

## 2017-09-01 RX ORDER — SIMETHICONE 80 MG
80 TABLET,CHEWABLE ORAL 4 TIMES DAILY PRN
Status: DISCONTINUED | OUTPATIENT
Start: 2017-09-01 | End: 2017-09-04 | Stop reason: HOSPADM

## 2017-09-01 RX ORDER — HYDROCODONE BITARTRATE AND ACETAMINOPHEN 10; 325 MG/1; MG/1
1 TABLET ORAL EVERY 4 HOURS PRN
Status: DISCONTINUED | OUTPATIENT
Start: 2017-09-01 | End: 2017-09-02

## 2017-09-01 RX ORDER — ROPIVACAINE HYDROCHLORIDE 2 MG/ML
INJECTION, SOLUTION EPIDURAL; INFILTRATION; PERINEURAL
Status: COMPLETED
Start: 2017-09-01 | End: 2017-09-01

## 2017-09-01 RX ORDER — SODIUM CHLORIDE, SODIUM LACTATE, POTASSIUM CHLORIDE, CALCIUM CHLORIDE 600; 310; 30; 20 MG/100ML; MG/100ML; MG/100ML; MG/100ML
INJECTION, SOLUTION INTRAVENOUS CONTINUOUS
Status: DISCONTINUED | OUTPATIENT
Start: 2017-09-01 | End: 2017-09-01

## 2017-09-01 RX ORDER — BISACODYL 10 MG
10 SUPPOSITORY, RECTAL RECTAL PRN
Status: DISCONTINUED | OUTPATIENT
Start: 2017-09-01 | End: 2017-09-04 | Stop reason: HOSPADM

## 2017-09-01 RX ORDER — CITRIC ACID/SODIUM CITRATE 334-500MG
SOLUTION, ORAL ORAL
Status: COMPLETED
Start: 2017-09-01 | End: 2017-09-01

## 2017-09-01 RX ORDER — SODIUM CHLORIDE, SODIUM LACTATE, POTASSIUM CHLORIDE, CALCIUM CHLORIDE 600; 310; 30; 20 MG/100ML; MG/100ML; MG/100ML; MG/100ML
INJECTION, SOLUTION INTRAVENOUS PRN
Status: DISCONTINUED | OUTPATIENT
Start: 2017-09-01 | End: 2017-09-04 | Stop reason: HOSPADM

## 2017-09-01 RX ORDER — METOCLOPRAMIDE HYDROCHLORIDE 5 MG/ML
10 INJECTION INTRAMUSCULAR; INTRAVENOUS EVERY 6 HOURS PRN
Status: DISCONTINUED | OUTPATIENT
Start: 2017-09-01 | End: 2017-09-02

## 2017-09-01 RX ORDER — MISOPROSTOL 200 UG/1
800 TABLET ORAL
Status: COMPLETED | OUTPATIENT
Start: 2017-09-01 | End: 2017-09-01

## 2017-09-01 RX ORDER — DOCUSATE SODIUM 100 MG/1
100 CAPSULE, LIQUID FILLED ORAL 2 TIMES DAILY PRN
Status: DISCONTINUED | OUTPATIENT
Start: 2017-09-01 | End: 2017-09-04 | Stop reason: HOSPADM

## 2017-09-01 RX ORDER — ACETAMINOPHEN 325 MG/1
325 TABLET ORAL EVERY 4 HOURS PRN
Status: DISCONTINUED | OUTPATIENT
Start: 2017-09-01 | End: 2017-09-04 | Stop reason: HOSPADM

## 2017-09-01 RX ORDER — ONDANSETRON 2 MG/ML
4 INJECTION INTRAMUSCULAR; INTRAVENOUS EVERY 6 HOURS PRN
Status: DISCONTINUED | OUTPATIENT
Start: 2017-09-01 | End: 2017-09-02

## 2017-09-01 RX ORDER — DIPHENHYDRAMINE HYDROCHLORIDE 50 MG/ML
12.5 INJECTION INTRAMUSCULAR; INTRAVENOUS EVERY 6 HOURS PRN
Status: DISCONTINUED | OUTPATIENT
Start: 2017-09-01 | End: 2017-09-02

## 2017-09-01 RX ORDER — HYDROCODONE BITARTRATE AND ACETAMINOPHEN 5; 325 MG/1; MG/1
1 TABLET ORAL EVERY 4 HOURS PRN
Status: DISCONTINUED | OUTPATIENT
Start: 2017-09-01 | End: 2017-09-02

## 2017-09-01 RX ORDER — BUPIVACAINE HYDROCHLORIDE 2.5 MG/ML
INJECTION, SOLUTION EPIDURAL; INFILTRATION; INTRACAUDAL
Status: DISCONTINUED
Start: 2017-09-01 | End: 2017-09-01

## 2017-09-01 RX ORDER — DIPHENHYDRAMINE HYDROCHLORIDE 50 MG/ML
25 INJECTION INTRAMUSCULAR; INTRAVENOUS EVERY 6 HOURS PRN
Status: DISCONTINUED | OUTPATIENT
Start: 2017-09-01 | End: 2017-09-02

## 2017-09-01 RX ORDER — KETOROLAC TROMETHAMINE 30 MG/ML
30 INJECTION, SOLUTION INTRAMUSCULAR; INTRAVENOUS EVERY 6 HOURS
Status: DISCONTINUED | OUTPATIENT
Start: 2017-09-01 | End: 2017-09-02

## 2017-09-01 RX ADMIN — HYDROCODONE BITARTRATE AND ACETAMINOPHEN 1 TABLET: 10; 325 TABLET ORAL at 23:05

## 2017-09-01 RX ADMIN — GABAPENTIN 300 MG: 300 CAPSULE ORAL at 08:19

## 2017-09-01 RX ADMIN — SIMETHICONE 80 MG: 80 TABLET, CHEWABLE ORAL at 21:16

## 2017-09-01 RX ADMIN — ROPIVACAINE HYDROCHLORIDE 10 ML: 2 INJECTION, SOLUTION EPIDURAL; INFILTRATION at 00:46

## 2017-09-01 RX ADMIN — MISOPROSTOL 800 MCG: 200 TABLET ORAL at 03:30

## 2017-09-01 RX ADMIN — DOCUSATE SODIUM 100 MG: 100 CAPSULE ORAL at 19:17

## 2017-09-01 RX ADMIN — SODIUM CHLORIDE, SODIUM LACTATE, POTASSIUM CHLORIDE, CALCIUM CHLORIDE 1000 ML: 600; 310; 30; 20 INJECTION, SOLUTION INTRAVENOUS at 00:48

## 2017-09-01 RX ADMIN — SODIUM CHLORIDE, SODIUM LACTATE, POTASSIUM CHLORIDE, CALCIUM CHLORIDE 1000 ML: 600; 310; 30; 20 INJECTION, SOLUTION INTRAVENOUS at 00:10

## 2017-09-01 RX ADMIN — HYDROCODONE BITARTRATE AND ACETAMINOPHEN 1 TABLET: 5; 325 TABLET ORAL at 19:17

## 2017-09-01 RX ADMIN — OMEPRAZOLE 20 MG: 20 CAPSULE, DELAYED RELEASE ORAL at 12:32

## 2017-09-01 RX ADMIN — SODIUM CHLORIDE, SODIUM LACTATE, POTASSIUM CHLORIDE, CALCIUM CHLORIDE 1000 ML: 600; 310; 30; 20 INJECTION, SOLUTION INTRAVENOUS at 02:29

## 2017-09-01 RX ADMIN — HYDROCODONE BITARTRATE AND ACETAMINOPHEN 1 TABLET: 5; 325 TABLET ORAL at 09:40

## 2017-09-01 RX ADMIN — SIMETHICONE 80 MG: 80 TABLET, CHEWABLE ORAL at 14:16

## 2017-09-01 RX ADMIN — SODIUM CHLORIDE, POTASSIUM CHLORIDE, SODIUM LACTATE AND CALCIUM CHLORIDE 1000 ML: 600; 310; 30; 20 INJECTION, SOLUTION INTRAVENOUS at 00:10

## 2017-09-01 RX ADMIN — HYDROCODONE BITARTRATE AND ACETAMINOPHEN 1 TABLET: 5; 325 TABLET ORAL at 14:15

## 2017-09-01 RX ADMIN — Medication 30 ML: at 02:28

## 2017-09-01 RX ADMIN — SODIUM CHLORIDE, POTASSIUM CHLORIDE, SODIUM LACTATE AND CALCIUM CHLORIDE 1000 ML: 600; 310; 30; 20 INJECTION, SOLUTION INTRAVENOUS at 00:48

## 2017-09-01 RX ADMIN — SODIUM CITRATE AND CITRIC ACID MONOHYDRATE 30 ML: 500; 334 SOLUTION ORAL at 02:28

## 2017-09-01 RX ADMIN — HYDROCODONE BITARTRATE AND ACETAMINOPHEN 1 TABLET: 5; 325 TABLET ORAL at 06:03

## 2017-09-01 RX ADMIN — SODIUM CHLORIDE, POTASSIUM CHLORIDE, SODIUM LACTATE AND CALCIUM CHLORIDE 1000 ML: 600; 310; 30; 20 INJECTION, SOLUTION INTRAVENOUS at 02:29

## 2017-09-01 RX ADMIN — Medication 1 TABLET: at 08:20

## 2017-09-01 ASSESSMENT — PAIN SCALES - GENERAL
PAINLEVEL_OUTOF10: 2
PAINLEVEL_OUTOF10: 2
PAINLEVEL_OUTOF10: 4
PAINLEVEL_OUTOF10: 4
PAINLEVEL_OUTOF10: 0
PAINLEVEL_OUTOF10: 0
PAINLEVEL_OUTOF10: 7
PAINLEVEL_OUTOF10: 0
PAINLEVEL_OUTOF10: 2
PAINLEVEL_OUTOF10: 4
PAINLEVEL_OUTOF10: 0

## 2017-09-01 NOTE — CARE PLAN
Problem: Safety  Goal: Free from accidental injury    Intervention: Initiate Safety Measures  Pt aware of safety parameters.      Problem: Pain  Goal: Alleviation of Pain or a reduction in pain to the patient's comfort goal    Intervention: Initial pain assessment  Pain management discussed pt would like to go natural.

## 2017-09-01 NOTE — PROGRESS NOTES
"S: Pt very uncomfortable with contractions.    O:  Vitals:    08/31/17 0949 08/31/17 1212 08/31/17 1902 08/31/17 2244   BP:  107/54 122/70 117/61   Pulse:  86 97 88   Resp:       Temp:   36.9 °C (98.5 °F) 36.5 °C (97.7 °F)   Weight: 106.6 kg (235 lb)      Height: 1.575 m (5' 2.01\")        [place] - q 2min  EFM - 140s, moderate variability, + accels  Cx - 5 cm/75%/-2, ballotable, head not well applied.    Pit at 14    A/P: IUP @ 39+5/7 wks admitted for elective IOL.  Fetal status reassuring.  Anesthesia consult for epidural.  AROM when head well applied.   "

## 2017-09-01 NOTE — CONSULTS
RN reports that mother is on Gabapentin, per [facility] [employer] the medication is an L2 which is considered safe for breastfeeding, RN updated and will update mother.

## 2017-09-01 NOTE — PROGRESS NOTES
: Report received from ELISA Reyes RN. POC discussed.    : Dr. Ulloa at bedside to assess pt. SANDY Ochoa, SANDER at bedside assisting.    : SVE, - done per SAPPHIRE Reveles RN.    : Dr. Ulloa updated. At bedside to evaluate pt in person. SVE, /Ballotable. Bolus started for epidural.    0015: Pt reports that she feels urge to push. SVE, unchanged from previous exam.    0031: Dr. Dobbins at bedside to place epidural.    0102: BP 91/35 IV Fluids increased. Will recheck pt asymptomatic at this time.    0105: /58 pt remains asymptomatic.    0110: FHT tracing LD's, pt repositioned on far right side.    0114: LD's, pitocin tured down to 10 and Oxygen mask placed at 10L.    0118: SVE, BBOW felt. Dr. Ulloa notified. Of SVE and Repetitive LD. Will come evaluate pt in person.    0124: Dr. Ulloa at bedside to evaluate pt in person. AROM, clear. SVE, Antlip/ -2-3. MD pushing with pt with RN at bedside, tracing being reviewed while pushing. Previous intervention continued: Oxygen at 10L, And bolus continuous, pt wedge to right side. Pitocin continued per MD.    0218: Dr. Ulloa re-evaluating. POC discussed with pt for Primary  Section for Failure/ FITL to Descend per MD. Pt consented and prepped for surgery.    0500: Report given LEONCIO Casey RN.

## 2017-09-01 NOTE — PROGRESS NOTES
"S: Pt feeling pressure    O:  Vitals:    17 0949 17 1212 17 1902 17 2244   BP:  107/54 122/70 117/61   Pulse:  86 97 88   Resp:       Temp:   36.9 °C (98.5 °F) 36.5 °C (97.7 °F)   Weight: 106.6 kg (235 lb)      Height: 1.575 m (5' 2.01\")        [place] - q 3 min  EFM - 170s. Minimal variability, severe variables with pushing  Cx - C/C/-2 in LOT presentation    A/P: IUP @ 39+5/7 wks admitted for elective IOL per patient request.  Patient pushing for 1 hour with no descent.  Fetus in LOT presentation, unable to manually rotate.  Now with repetitive severe variable decelerations with pushing, fetal tachycardia, and minimal variability.  Will proceed with primary C/S for arrest of descent and fetal intolerance of labor.    Discussed with the patient indications for  delivery. The patient voiced understanding of indications for  section at this time.    Discussed with the patient the risks of  delivery. The risks include bleeding, infection, transfusion, emergency hysterectomy to control bleeding, damage to surrounding organs (bowel, bladder, ureters, nerves, vessels), need for repair or future surgery, fetal injury, unexpected pathology, anesthesia risks, and rarely death.  I also discussed with the patient the risk of wound infection, wound breakdown, and scarring. We discussed that these risks are greater in people that have diabetes or obesity.    The patient had the opportunity to ask questions regarding the procedure. All questions answered to the patient's satisfaction. Plan to proceed with  delivery.   "

## 2017-09-01 NOTE — OR SURGEON
Operative Report    PreOp Diagnosis: IUP @ 39+5/7 wks with arrest of descent, fetal intolerance of labor.     PostOp Diagnosis: Same    Procedure(s):  PRIMARY C SECTION    Surgeon(s):  Barbra Ulloa M.D.    Anesthesiologist/Type of Anesthesia:  Anesthesiologist: (Unknown)/Spinal    Surgical Staff:  Circulator: (Unknown)  Scrub Person: (Unknown)    Specimens:  * No specimens in log *    Estimated Blood Loss: 900 cc    Findings: viable female infant in LOT presentation, clear fluid, apgars 8 and 9, weight 3390 grams.  Normal uterus, tubes, ovaries.    Complications: None        9/1/2017 3:34 AM Barbra Ulloa

## 2017-09-01 NOTE — PROGRESS NOTES
Pt admitted to room S312 via gurney with FOB, infant, and L&D RN. Report received from SANDER Hoover. VSS.  in use. SCDs in use. Pt states 4/10 pain. Will put in anesthesia orders and medicate appropriately. Oriented to unit, room, infant sleeping policy, infant feeding policy, security policy, call light, skylight, emergency call light, and POC. FOB and pt express understanding. Call light within reach, encouraged to call for assistance.

## 2017-09-01 NOTE — CARE PLAN
Problem: Altered physiologic condition related to postoperative  delivery  Goal: Patient physiologically stable as evidenced by normal lochia, palpable uterine involution and vital signs within normal limits  Outcome: PROGRESSING AS EXPECTED  Patient's fundus is firm, lochia light and vital signs within defined limits. Patient educated on appropriate amounts of bleeding and when to notify RN/MD, patient verbalized understanding.     Problem: Potential for postpartum infection related to surgical incision, compromised uterine condition, urinary tract or respiratory compromise  Goal: Patient will be afebrile and free from signs and symptoms of infection  Outcome: PROGRESSING AS EXPECTED  Patient has no signs/symptoms of infection, afebrile. Patient educated about signs/symptoms of infection and when to notify MD, patient verbalized understanding.

## 2017-09-01 NOTE — PROGRESS NOTES
Patient up to bathroom, jasvir care provided, patient tolerated well. No dizziness or unsteadiness noted.  Patient back to bed.

## 2017-09-01 NOTE — PROGRESS NOTES
"S: Pt feeling contractions, coping well with pain.    O:  Vitals:    17 0921 17 0949 17 1212   BP: 132/85  107/54   Pulse: 91  86   Resp: 16     Temp: 36.9 °C (98.4 °F)     Weight:  106.6 kg (235 lb)    Height:  1.575 m (5' 2.01\")      [place] - q 3 min  EFM -140s, moderate variability, + accels  Cx - 4 cm/75%/-2 but ballotable with exam, very posterior, head not well applied.    Pit at 11    A/P: IUP @ 39+4/7 wks admitted for IOL.  Fetal status reassuring.  Continue pitocin.  Anticipate .  "

## 2017-09-01 NOTE — OP REPORT
DATE OF SERVICE:  2017    PREOPERATIVE DIAGNOSES:  1.  Intrauterine pregnancy at 39-4/7 weeks.  2.  Failed induction with arrest of descent.  3.  Fetal intolerance of labor.    POSTOPERATIVE DIAGNOSES:  1.  Intrauterine pregnancy at 39-4/7 weeks.  2.  Failed induction with arrest of descent.  3.  Fetal intolerance of labor.    PROCEDURE PERFORMED:  Primary low transverse  section via   Pfannenstiel.    SURGEON:  Barbra Ulloa MD    FIRST ASSISTANT:  Ciara Amaya, certified nurse midwife.    ANESTHESIA:  Epidural.    COMPLICATIONS:  None.    ESTIMATED BLOOD LOSS:  900 mL    URINE OUTPUT:  250 mL of clear urine.    IV FLUID:  1400 mL LR.    INDICATIONS FOR PROCEDURE:  This is a 31-year-old  3, para 2-0-0-2 at   39 +5/7 weeks who presented for elective induction of labor.  The patient   underwent induction of labor and progressed to complete-complete and -2   station.  The infant was noted to be in the left occiput transverse   presentation.  The patient pushed for 1 hour with no change in descent and was   noted to have recurrent severe variable decelerations with resulting   tachycardia and loss of [id number] variability.  The decision at this time   was made to proceed with primary  section.    FINDINGS:  Viable female infant in LOT presentation, weighing 3390 g, with   Apgars of 8 and 9, normal uterus, tubes, and ovaries bilaterally.    PROCEDURE IN DETAIL:  The patient was taken to the operating room where epidural   anesthesia was found to be adequate.  She was then prepped and draped in the   normal sterile fashion in the dorsal supine position with the leftward hip   tilt.  A Pfannenstiel skin incision was made with the scalpel and carried down   to the underlying layer of the fascia, which was nicked in the midline and   the incision was extended laterally with the Spring scissors.  The superior   aspect of the fascial incision was grasped with the Kocher clamps, elevated   and  the underlying rectus muscles dissected off sharply.  Attention was then   turned to the inferior aspect of this incision, which in a similar fashion was   grasped, tented up with Kocher clamps and the rectus muscles dissected off   sharply.  The rectus muscles were  in the midline.  The peritoneum   was identified, tented up and entered sharply with the Metzenbaum scissors.    The peritoneal incision was extended superiorly and inferiorly with good   visualization of the bladder and the bladder blade was then inserted and the   vesicouterine peritoneum identified, grasped with pickups and entered sharply   with the Metzenbaum scissors.  This incision was extended laterally and the   bladder flap was created digitally.  The bladder blade was then reinserted and   the lower uterine segment incised in a transverse fashion with the scalpel.    The uterine incision was extended laterally digitally.  The bladder blade was   removed and the infant's head was delivered atraumatically.  Then, mouth and   nose were suctioned with the bulb suction and the cord was clamped and cut.    The infant was handed off to the awaiting attendant.  Cord gases were sent.    The placenta was then removed manually and the uterus was exteriorized and   cleared of all clot and debris.  The uterine incision was repaired with 1-0   chromic in a running locked fashion.  Excellent hemostasis was observed.  The   uterus was returned to the abdomen.  The gutters were cleared of all clot and   debris.  The peritoneum was closed with 3-0 Vicryl.  The fascia was   reapproximated with 0 Vicryl.  The subcuticular fat was irrigated.  Sarthak's   layer was closed with interrupted sutures of 2-0 Vicryl and the skin was   closed with staples.  The patient tolerated the procedure well.  Sponge, lap   and needle counts were correct x3.  The patient was taken to the recovery room   in stable condition.  A dose of Cytotec was given 800 mcg MN due to  atony   noted after repair of the uterine incision.       ____________________________________     Barbra Ulloa MD [facility] / [facility]    DD:  09/01/2017 09:08:07  DT:  09/01/2017 09:48:27    D#:  [id number]  Job#:  [id number]

## 2017-09-01 NOTE — PROGRESS NOTES
0705-Report received at bedside from Sagrario BOUCHER, assumed care of patient.  Encouraged to call with needs, denies at this time.   0800-Assessment completed, fundus is firm, lochia light and vital signs within defined parameters. Bonding well with infant, breastfeeding independently.  Discussed with patient pain medication plan, patient requesting to be medicated PRN, will call for medication.  Plan of care discussed, patient verbalized understanding. Hourly rounding implemented, call light within reach.

## 2017-09-02 PROCEDURE — 700102 HCHG RX REV CODE 250 W/ 637 OVERRIDE(OP): Performed by: ANESTHESIOLOGY

## 2017-09-02 PROCEDURE — 700102 HCHG RX REV CODE 250 W/ 637 OVERRIDE(OP): Performed by: OBSTETRICS & GYNECOLOGY

## 2017-09-02 PROCEDURE — A9270 NON-COVERED ITEM OR SERVICE: HCPCS | Performed by: OBSTETRICS & GYNECOLOGY

## 2017-09-02 PROCEDURE — A9270 NON-COVERED ITEM OR SERVICE: HCPCS | Performed by: ANESTHESIOLOGY

## 2017-09-02 PROCEDURE — 700112 HCHG RX REV CODE 229: Performed by: OBSTETRICS & GYNECOLOGY

## 2017-09-02 PROCEDURE — 700102 HCHG RX REV CODE 250 W/ 637 OVERRIDE(OP): Performed by: STUDENT IN AN ORGANIZED HEALTH CARE EDUCATION/TRAINING PROGRAM

## 2017-09-02 PROCEDURE — 770002 HCHG ROOM/CARE - OB PRIVATE (112)

## 2017-09-02 PROCEDURE — A9270 NON-COVERED ITEM OR SERVICE: HCPCS | Performed by: STUDENT IN AN ORGANIZED HEALTH CARE EDUCATION/TRAINING PROGRAM

## 2017-09-02 RX ORDER — DIPHENHYDRAMINE HCL 25 MG
25 TABLET ORAL EVERY 6 HOURS PRN
Status: DISCONTINUED | OUTPATIENT
Start: 2017-09-02 | End: 2017-09-04 | Stop reason: HOSPADM

## 2017-09-02 RX ORDER — HYDROCODONE BITARTRATE AND ACETAMINOPHEN 5; 325 MG/1; MG/1
1-2 TABLET ORAL EVERY 6 HOURS PRN
Status: DISCONTINUED | OUTPATIENT
Start: 2017-09-02 | End: 2017-09-02

## 2017-09-02 RX ORDER — IBUPROFEN 600 MG/1
600 TABLET ORAL EVERY 6 HOURS PRN
Status: DISCONTINUED | OUTPATIENT
Start: 2017-09-02 | End: 2017-09-04 | Stop reason: HOSPADM

## 2017-09-02 RX ORDER — ONDANSETRON 2 MG/ML
4 INJECTION INTRAMUSCULAR; INTRAVENOUS EVERY 6 HOURS PRN
Status: DISCONTINUED | OUTPATIENT
Start: 2017-09-02 | End: 2017-09-04 | Stop reason: HOSPADM

## 2017-09-02 RX ORDER — OXYCODONE HYDROCHLORIDE 10 MG/1
10 TABLET ORAL EVERY 4 HOURS PRN
Status: DISCONTINUED | OUTPATIENT
Start: 2017-09-02 | End: 2017-09-02

## 2017-09-02 RX ORDER — HYDROCODONE BITARTRATE AND ACETAMINOPHEN 5; 325 MG/1; MG/1
1-2 TABLET ORAL EVERY 4 HOURS PRN
Status: DISCONTINUED | OUTPATIENT
Start: 2017-09-02 | End: 2017-09-04 | Stop reason: HOSPADM

## 2017-09-02 RX ORDER — OXYCODONE HYDROCHLORIDE 5 MG/1
5 TABLET ORAL EVERY 4 HOURS PRN
Status: DISCONTINUED | OUTPATIENT
Start: 2017-09-02 | End: 2017-09-02

## 2017-09-02 RX ORDER — ONDANSETRON 2 MG/ML
4 INJECTION INTRAMUSCULAR; INTRAVENOUS EVERY 6 HOURS PRN
Status: DISCONTINUED | OUTPATIENT
Start: 2017-09-02 | End: 2017-09-02

## 2017-09-02 RX ORDER — MORPHINE SULFATE 4 MG/ML
4 INJECTION, SOLUTION INTRAMUSCULAR; INTRAVENOUS
Status: DISCONTINUED | OUTPATIENT
Start: 2017-09-02 | End: 2017-09-04 | Stop reason: HOSPADM

## 2017-09-02 RX ORDER — ONDANSETRON 4 MG/1
4 TABLET, ORALLY DISINTEGRATING ORAL EVERY 6 HOURS PRN
Status: DISCONTINUED | OUTPATIENT
Start: 2017-09-02 | End: 2017-09-02

## 2017-09-02 RX ORDER — ONDANSETRON 4 MG/1
4 TABLET, ORALLY DISINTEGRATING ORAL EVERY 6 HOURS PRN
Status: DISCONTINUED | OUTPATIENT
Start: 2017-09-02 | End: 2017-09-04 | Stop reason: HOSPADM

## 2017-09-02 RX ORDER — IBUPROFEN 800 MG/1
800 TABLET ORAL EVERY 8 HOURS PRN
Status: DISCONTINUED | OUTPATIENT
Start: 2017-09-02 | End: 2017-09-02

## 2017-09-02 RX ORDER — KETOROLAC TROMETHAMINE 30 MG/ML
30 INJECTION, SOLUTION INTRAMUSCULAR; INTRAVENOUS EVERY 6 HOURS
Status: DISCONTINUED | OUTPATIENT
Start: 2017-09-02 | End: 2017-09-02

## 2017-09-02 RX ORDER — DIPHENHYDRAMINE HYDROCHLORIDE 50 MG/ML
25 INJECTION INTRAMUSCULAR; INTRAVENOUS EVERY 6 HOURS PRN
Status: DISCONTINUED | OUTPATIENT
Start: 2017-09-02 | End: 2017-09-04 | Stop reason: HOSPADM

## 2017-09-02 RX ADMIN — Medication 1 TABLET: at 08:22

## 2017-09-02 RX ADMIN — DOCUSATE SODIUM 100 MG: 100 CAPSULE ORAL at 22:27

## 2017-09-02 RX ADMIN — SIMETHICONE 80 MG: 80 TABLET, CHEWABLE ORAL at 16:21

## 2017-09-02 RX ADMIN — HYDROCODONE BITARTRATE AND ACETAMINOPHEN 1 TABLET: 5; 325 TABLET ORAL at 22:28

## 2017-09-02 RX ADMIN — SIMETHICONE 80 MG: 80 TABLET, CHEWABLE ORAL at 22:28

## 2017-09-02 RX ADMIN — GABAPENTIN 300 MG: 300 CAPSULE ORAL at 08:22

## 2017-09-02 RX ADMIN — IBUPROFEN 600 MG: 600 TABLET, FILM COATED ORAL at 16:20

## 2017-09-02 RX ADMIN — SIMETHICONE 80 MG: 80 TABLET, CHEWABLE ORAL at 08:22

## 2017-09-02 RX ADMIN — DOCUSATE SODIUM 100 MG: 100 CAPSULE ORAL at 08:22

## 2017-09-02 RX ADMIN — IBUPROFEN 600 MG: 600 TABLET, FILM COATED ORAL at 22:28

## 2017-09-02 RX ADMIN — IBUPROFEN 600 MG: 600 TABLET, FILM COATED ORAL at 10:11

## 2017-09-02 RX ADMIN — OXYCODONE HYDROCHLORIDE 10 MG: 10 TABLET ORAL at 07:10

## 2017-09-02 RX ADMIN — HYDROCODONE BITARTRATE AND ACETAMINOPHEN 1 TABLET: 10; 325 TABLET ORAL at 03:04

## 2017-09-02 RX ADMIN — OMEPRAZOLE 20 MG: 20 CAPSULE, DELAYED RELEASE ORAL at 08:22

## 2017-09-02 RX ADMIN — HYDROCODONE BITARTRATE AND ACETAMINOPHEN 1 TABLET: 5; 325 TABLET ORAL at 16:21

## 2017-09-02 RX ADMIN — SIMETHICONE 80 MG: 80 TABLET, CHEWABLE ORAL at 03:03

## 2017-09-02 RX ADMIN — HYDROCODONE BITARTRATE AND ACETAMINOPHEN 1 TABLET: 5; 325 TABLET ORAL at 11:50

## 2017-09-02 ASSESSMENT — PAIN SCALES - GENERAL
PAINLEVEL_OUTOF10: 5
PAINLEVEL_OUTOF10: 7
PAINLEVEL_OUTOF10: 5
PAINLEVEL_OUTOF10: 7
PAINLEVEL_OUTOF10: 4
PAINLEVEL_OUTOF10: 3
PAINLEVEL_OUTOF10: 5
PAINLEVEL_OUTOF10: 6
PAINLEVEL_OUTOF10: 7
PAINLEVEL_OUTOF10: 3
PAINLEVEL_OUTOF10: 5

## 2017-09-02 NOTE — CARE PLAN
Problem: Altered physiologic condition related to postoperative  delivery  Goal: Patient physiologically stable as evidenced by normal lochia, palpable uterine involution and vital signs within normal limits    Intervention: Massage fundus as necessary to prevent excessive lochia  Lochia light. Fundus firm.      Problem: Alteration in comfort related to surgical incision and/or after birth pains  Goal: Patient verbalizes acceptable pain level  Keep comfortable at rest.

## 2017-09-02 NOTE — PROGRESS NOTES
Bedside report received. Assumed pt. Care. Pt resting in bed. AAO x4.  Assessment done and WNL.  No s/s of distress noted. Fundus firm. Lochia light. Pt to be medicated for pain upon request. Call light and belongings within reach. Bed in the lowest position. Treaded socks in place. Will continue to monitor .

## 2017-09-02 NOTE — PROGRESS NOTES
"Name:   Murray Chavez   Date/Time:  2017 6:55 AM  Gestational Age:  39w6d  Admit Date:   2017  Admitting Dx:   Pregnancy  Indication for care in labor or delivery   for failure to Progress.  G3 now   Post-op day 1     Subjective:   Abdominal pain yes   Ambulating   yes  Tolerating PO  Only liquids  Flatus    no  Bleeding   Yes, lightening  Voiding   yes   Dizziness   no  Breast feeding  yes  Breast tenderness  no    Blood pressure 112/55, pulse 92, temperature 36.9 °C (98.4 °F), resp. rate 18, height 1.575 m (5' 2.01\"), weight 106.6 kg (235 lb), SpO2 94 %.  Breast Exam: Tenderness .no, Engourgement .no, Mastitis .no  Abdomen soft, non-tender. BS normal. No masses,  No organomegaly  Incisiondry and intact , staples intact  Fundus Tenderness:yes, Below umbilicus:Yes,  Extremities trace edema    Meds:   No current facility-administered medications on file prior to encounter.      Current Outpatient Prescriptions on File Prior to Encounter   Medication Sig Dispense Refill   • omeprazole (PRILOSEC) 20 MG delayed-release capsule Take 1 Cap by mouth every day. 30 Cap 4   • Prenatal MV-Min-Fe Fum-FA-DHA (PRENATAL 1 PO) Take  by mouth.         Assessment and Plan  normal postpartum course POD#1  No areas of skin breakdown/redness; surgical incision intact/healing, Voiding without difficulty,  has not passed gas yet    Continue routine postpartum care, encourage ambulation, pain control, anticipate D/C home POD#3    Melba Samuels M.D.  "

## 2017-09-02 NOTE — PROGRESS NOTES
0710-Report received at bedside from Lauryn BOUCHER, assumed care of patient.  Encouraged to call with needs, denies at this time.   1011- Assessment completed, fundus is firm, lochia light and vital signs within defined parameters. Abdominal incision is well approximated, open to air with staples in place. No signs of erythema, edema or drainage. Patient is ambulating and voiding without difficulty. Bonding well with infant, breastfeeding independently.  Discussed with patient pain medication plan, patient requesting to be medicated when medications are due, will call for medication.  Plan of care discussed, patient verbalized understanding. Hourly rounding implemented, call light within reach.

## 2017-09-03 PROCEDURE — A9270 NON-COVERED ITEM OR SERVICE: HCPCS | Performed by: STUDENT IN AN ORGANIZED HEALTH CARE EDUCATION/TRAINING PROGRAM

## 2017-09-03 PROCEDURE — 770002 HCHG ROOM/CARE - OB PRIVATE (112)

## 2017-09-03 PROCEDURE — 700102 HCHG RX REV CODE 250 W/ 637 OVERRIDE(OP): Performed by: OBSTETRICS & GYNECOLOGY

## 2017-09-03 PROCEDURE — 700112 HCHG RX REV CODE 229: Performed by: OBSTETRICS & GYNECOLOGY

## 2017-09-03 PROCEDURE — 700102 HCHG RX REV CODE 250 W/ 637 OVERRIDE(OP): Performed by: STUDENT IN AN ORGANIZED HEALTH CARE EDUCATION/TRAINING PROGRAM

## 2017-09-03 PROCEDURE — A9270 NON-COVERED ITEM OR SERVICE: HCPCS | Performed by: OBSTETRICS & GYNECOLOGY

## 2017-09-03 RX ADMIN — Medication 1 TABLET: at 09:27

## 2017-09-03 RX ADMIN — HYDROCODONE BITARTRATE AND ACETAMINOPHEN 1 TABLET: 5; 325 TABLET ORAL at 23:13

## 2017-09-03 RX ADMIN — SIMETHICONE 80 MG: 80 TABLET, CHEWABLE ORAL at 17:06

## 2017-09-03 RX ADMIN — IBUPROFEN 600 MG: 600 TABLET, FILM COATED ORAL at 04:30

## 2017-09-03 RX ADMIN — HYDROCODONE BITARTRATE AND ACETAMINOPHEN 1 TABLET: 5; 325 TABLET ORAL at 04:29

## 2017-09-03 RX ADMIN — SIMETHICONE 80 MG: 80 TABLET, CHEWABLE ORAL at 10:24

## 2017-09-03 RX ADMIN — SIMETHICONE 80 MG: 80 TABLET, CHEWABLE ORAL at 23:13

## 2017-09-03 RX ADMIN — IBUPROFEN 600 MG: 600 TABLET, FILM COATED ORAL at 23:13

## 2017-09-03 RX ADMIN — OMEPRAZOLE 20 MG: 20 CAPSULE, DELAYED RELEASE ORAL at 09:27

## 2017-09-03 RX ADMIN — GABAPENTIN 300 MG: 300 CAPSULE ORAL at 09:27

## 2017-09-03 RX ADMIN — DOCUSATE SODIUM 100 MG: 100 CAPSULE ORAL at 23:12

## 2017-09-03 RX ADMIN — IBUPROFEN 600 MG: 600 TABLET, FILM COATED ORAL at 17:06

## 2017-09-03 RX ADMIN — DOCUSATE SODIUM 100 MG: 100 CAPSULE ORAL at 09:27

## 2017-09-03 RX ADMIN — HYDROCODONE BITARTRATE AND ACETAMINOPHEN 1 TABLET: 5; 325 TABLET ORAL at 17:06

## 2017-09-03 RX ADMIN — HYDROCODONE BITARTRATE AND ACETAMINOPHEN 1 TABLET: 5; 325 TABLET ORAL at 10:24

## 2017-09-03 RX ADMIN — IBUPROFEN 600 MG: 600 TABLET, FILM COATED ORAL at 10:24

## 2017-09-03 RX ADMIN — SIMETHICONE 80 MG: 80 TABLET, CHEWABLE ORAL at 04:29

## 2017-09-03 ASSESSMENT — PAIN SCALES - GENERAL
PAINLEVEL_OUTOF10: 4
PAINLEVEL_OUTOF10: 2
PAINLEVEL_OUTOF10: 5
PAINLEVEL_OUTOF10: 5
PAINLEVEL_OUTOF10: 4
PAINLEVEL_OUTOF10: 6
PAINLEVEL_OUTOF10: 4
PAINLEVEL_OUTOF10: 3
PAINLEVEL_OUTOF10: 6
PAINLEVEL_OUTOF10: 7
PAINLEVEL_OUTOF10: 4
PAINLEVEL_OUTOF10: 3

## 2017-09-03 NOTE — PROGRESS NOTES
Bedside report received and assumed pt. Care at 1900. Pt resting in bed. AAO x4.  Assessment done and WNL.  No s/s of distress noted. Fundus firm. Lochia light. Pt to be medicated for pain around the clock. Call light and belongings within reach. Bed in the lowest position. Treaded socks in place. Will continue to monitor .

## 2017-09-03 NOTE — PROGRESS NOTES
0710-Report received at bedside from Lauryn RN, assumed care of patient. Encouraged to call with needs, denies at this time.   0930-Assessment completed, fundus is firm, lochia light and vital signs within defined parameters. Patient is ambulating and voiding without difficulty. Bonding well with infant, breastfeeding independently.  Discussed with patient pain medication plan, patient requesting to be medicated when medications are available, will call for medication.  Plan of care discussed, patient verbalized understanding. Hourly rounding implemented, call light within reach.

## 2017-09-03 NOTE — PROGRESS NOTES
"Name:   Murray Chavez   Date/Time:  9/3/2017 6:11 AM  Gestational Age:  39w6d  Admit Date:   2017  Admitting Dx:   Pregnancy  Indication for care in labor or delivery   for failure to Progress.  G3 now   Post-op day 2     Subjective:   Abdominal pain yes but improving  Ambulating   yes  Tolerating PO  Only liquids  Flatus    yes  Bleeding   Yes, lightening  Voiding   yes   Dizziness   no  Breast feeding  yes  Breast tenderness  no    Blood pressure 122/78, pulse 95, temperature 36.8 °C (98.2 °F), resp. rate 19, height 1.575 m (5' 2.01\"), weight 106.6 kg (235 lb), SpO2 94 %.  Breast Exam: Tenderness .no, Engourgement .no, Mastitis .no  Abdomen soft, non-tender. BS normal. No masses,  No organomegaly  Incisiondry and intact , staples intact  Fundus Tenderness:yes, Below umbilicus:Yes,  Extremities trace edema    Meds:   No current facility-administered medications on file prior to encounter.      Current Outpatient Prescriptions on File Prior to Encounter   Medication Sig Dispense Refill   • omeprazole (PRILOSEC) 20 MG delayed-release capsule Take 1 Cap by mouth every day. 30 Cap 4   • Prenatal MV-Min-Fe Fum-FA-DHA (PRENATAL 1 PO) Take  by mouth.         Assessment and Plan  normal postpartum course POD#2  No areas of skin breakdown/redness; surgical incision intact/healing, Voiding without difficulty,  Is passing gas, tolerating diet advancement.     Continue routine postpartum care, encourage ambulation, pain control, anticipate D/C home POD#3    Melba Samuels M.D.  "

## 2017-09-04 VITALS
BODY MASS INDEX: 43.24 KG/M2 | HEART RATE: 80 BPM | WEIGHT: 235 LBS | HEIGHT: 62 IN | TEMPERATURE: 97.7 F | OXYGEN SATURATION: 95 % | RESPIRATION RATE: 16 BRPM | SYSTOLIC BLOOD PRESSURE: 115 MMHG | DIASTOLIC BLOOD PRESSURE: 86 MMHG

## 2017-09-04 PROCEDURE — 700102 HCHG RX REV CODE 250 W/ 637 OVERRIDE(OP): Performed by: STUDENT IN AN ORGANIZED HEALTH CARE EDUCATION/TRAINING PROGRAM

## 2017-09-04 PROCEDURE — 700102 HCHG RX REV CODE 250 W/ 637 OVERRIDE(OP): Performed by: OBSTETRICS & GYNECOLOGY

## 2017-09-04 PROCEDURE — A9270 NON-COVERED ITEM OR SERVICE: HCPCS | Performed by: OBSTETRICS & GYNECOLOGY

## 2017-09-04 PROCEDURE — A9270 NON-COVERED ITEM OR SERVICE: HCPCS | Performed by: STUDENT IN AN ORGANIZED HEALTH CARE EDUCATION/TRAINING PROGRAM

## 2017-09-04 RX ORDER — HYDROCODONE BITARTRATE AND ACETAMINOPHEN 5; 325 MG/1; MG/1
1-2 TABLET ORAL EVERY 6 HOURS PRN
Qty: 30 TAB | Refills: 0 | Status: ON HOLD | OUTPATIENT
Start: 2017-09-04 | End: 2017-10-09

## 2017-09-04 RX ORDER — IBUPROFEN 600 MG/1
600 TABLET ORAL EVERY 6 HOURS PRN
Qty: 30 TAB | Refills: 1 | Status: ON HOLD | OUTPATIENT
Start: 2017-09-04 | End: 2017-10-09

## 2017-09-04 RX ORDER — LANOLIN ALCOHOL/MO/W.PET/CERES
325 CREAM (GRAM) TOPICAL
Qty: 90 TAB | Refills: 1 | Status: ON HOLD | OUTPATIENT
Start: 2017-09-04 | End: 2017-10-09

## 2017-09-04 RX ADMIN — OMEPRAZOLE 20 MG: 20 CAPSULE, DELAYED RELEASE ORAL at 08:15

## 2017-09-04 RX ADMIN — HYDROCODONE BITARTRATE AND ACETAMINOPHEN 2 TABLET: 5; 325 TABLET ORAL at 10:33

## 2017-09-04 RX ADMIN — IBUPROFEN 600 MG: 600 TABLET, FILM COATED ORAL at 05:04

## 2017-09-04 RX ADMIN — SIMETHICONE 80 MG: 80 TABLET, CHEWABLE ORAL at 05:04

## 2017-09-04 RX ADMIN — Medication 1 TABLET: at 08:15

## 2017-09-04 RX ADMIN — IBUPROFEN 600 MG: 600 TABLET, FILM COATED ORAL at 11:17

## 2017-09-04 RX ADMIN — GABAPENTIN 300 MG: 300 CAPSULE ORAL at 08:15

## 2017-09-04 RX ADMIN — HYDROCODONE BITARTRATE AND ACETAMINOPHEN 1 TABLET: 5; 325 TABLET ORAL at 05:04

## 2017-09-04 ASSESSMENT — PAIN SCALES - GENERAL
PAINLEVEL_OUTOF10: 0
PAINLEVEL_OUTOF10: 5
PAINLEVEL_OUTOF10: 3

## 2017-09-04 NOTE — DISCHARGE INSTRUCTIONS
POSTPARTUM DISCHARGE INSTRUCTIONS FOR MOM    YOB: 1985   Age: 31 y.o.               Admit Date: 2017     Discharge Date: 2017  Attending Doctor:  Barbra Ulloa M.D.                  Allergies:  Review of patient's allergies indicates no known allergies.    Discharged to home by car. Discharged via wheelchair, hospital escort: Yes.  Special equipment needed: Not Applicable and Cane  Belongings with: Personal  Be sure to schedule a follow-up appointment with your primary care doctor or any specialists as instructed.     Discharge Plan:   Influenza Vaccine Indication: Patient Refuses    REASONS TO CALL YOUR OBSTETRICIAN:    1.   Persistent fever or shaking chills (Temperature higher than 100.4)  2.   Heavy bleeding (soaking more than 1 pad per hour); Passing clots  3.   Foul odor from vagina  4.   Mastitis (Breast infection; breast pain, chills, fever, redness)  5.   Urinary pain, burning or frequency  6.   Episiotomy infection  7.   Abdominal incision infection  8.   Severe depression longer than 24 hours      HAND WASHING  · Prior to handling the baby.  · Before breastfeeding or bottle feeding baby.  · After using the bathroom.    WOUND CARE  ·  Incision:    Keep clean and dry.  Do NOT lift anything heavier than your baby for up to 6 weeks.  There should not be any opening or pus.  Remove steristrips after 5 days if still present.  · Episiotomy/Laceration:    Use tucks or Dermoplast spray, Sitz Bath as needed (6 inches of warm water), continue to use jasvir bottle until bleeding stops.    BREAST CARE:  · Wear supportive bra.  · No soap on breast or nipples if breastfeeding.  · Apply lanolin for sore cracked nipples.  Do NOT wash lanolin off prior to breastfeeding.    VAGINAL CARE  · Nothing inside vagina for 6 weeks: NO sex, No douching, No tampons.  · Bleeding may continue for 2-4 weeks.  Amount may vary.  Call your OB doctor for heavy bleeding which means soaking more than 1 pad per  "hour.    BIRTH CONTROL  · It is possible to become pregnant at any time after delivery and while breastfeeding.  · Plan to discuss with your doctor a method of birth control at your 6 week visit.    DIET AND ELIMINATION  · Eating more fiber (bran cereal, fruits, and vegetables) AND drinking lots of fluids will help to avoid constipation.  · Urinary frequency after childbirth is normal.    POSTPARTUM BLUES  During the first few days after birth, you may experience a sense of the \"blues\" including:  · Impatience, irritability, or crying    These feelings come and go quickly.  However, as many as one in ten women experience emotional symptoms known a postpartum depression.    Postpartum depression:  May start as early as the second or third day after delivery or take several weeks or months to develop.  Symptoms of \"blues\" are present, but are more intense.    ·        Loss of appetite  ·        Crying spells  ·        Feelings of hopelessness or loss of control  ·        Over concern or no concern at all about the baby  ·        Fear of touching the baby  ·        Little or no concern about your own appearance  ·        Inability to sleep or need of excessive sleep    Any depression over 24 hours should be reported to your obstetrician.      Crisis Hotline:  National Crisis Hotline:  9-314-PHEKGGN  Or 1-498.822.3299  [place] Crisis Hotline:  1-578.857.6849  Or 500-919-0150      Quit Smoking / Tobacco Use:    I understand the use of any tobacco products increases my chance of suffering from future heart disease and could cause other illnesses which may shorten my life. Quitting the use of tobacco products is the single most important thing I can do to improve my health. For further information on smoking / tobacco cessation call a Toll Free Quit Line at 1-350.888.8425 (*[employer]) or 1-680.631.1048 (American Lung Association) or you can access the web based program at www.lungusa.org.    [facility] Tobacco " Users Help Line:  (937) 262-6205       Toll Free: 1-636.129.6524  ADDITIONAL EDUCATIONAL MATERIALS GIVEN TO PATIENT:      My signature on this form indicates that:    1.  I have reviewed and understand the above information  2.  My questions regarding this information have been answered to my satisfaction.  3.  I have formulated a plan with my discharge nurse to obtain my prescribed medication for home.

## 2017-09-04 NOTE — PROGRESS NOTES
Full assessment complete. Education done on postpartum and  care, all questions and concerns were addressed. No PIV access.     Lower abdominal surgical incision assessed, approximated with staples, no s/s of infection. Education done on home care, pt verbalizes an understadning.  Father to bring care seat from this RN to assess.     Discussed, pain well controlled at this time per pt with PO pain medication. Pt would like this RN to bring in Norco and Ibuprofen when available  Discharge order received, awaiting d/c order for infant

## 2017-09-04 NOTE — DISCHARGE SUMMARY
Discharge Summary:      Murray Chavez      Admit Date:   2017  Discharge Date:  2017     Admitting diagnosis:  Pregnancy  Indication for care in labor or delivery  Failure to Progress.  Discharge Diagnosis: Status post  for failure to progress.  Pregnancy Complications: gestational HTN  Tubal Ligation:  no        History:  Past Medical History:   Diagnosis Date   • HPV in female     DX    • Migraine    • Urinary tract infection, site not specified     bladder infections     OB History    Para Term  AB Living   3 2 2     2   SAB TAB Ectopic Molar Multiple Live Births             2      # Outcome Date GA Lbr Severo/2nd Weight Sex Delivery Anes PTL Lv   3 Current            2 Term 11 40w0d  3.714 kg (8 lb 3 oz) M Vag-Spont None N SHAMIR   1 Term 10/06/07 40w0d  2.892 kg (6 lb 6 oz) F Vag-Spont EPI N SHAMIR           Review of patient's allergies indicates no known allergies.  Patient Active Problem List    Diagnosis Date Noted   • Indication for care in labor or delivery 2017   • Supervision of normal intrauterine pregnancy in multigravida in third trimester 2017   • Migraines 2017   • Gestational hypertension without significant proteinuria 2017        Hospital Course:   31 y.o. , now para 3, was admitted with the above mentioned diagnosis, underwent Induction of Labor,  for failure to progress. Patient postpartum course was unremarkable, with progressive advancement in diet , ambulation and toleration of oral analgesia. Patient without complaints today and desires discharge.      Vitals:    17 0800 17 0800 17   BP: (!) 99/54 122/78 127/62 107/65   Pulse: 90 95 68 76   Resp: 18 19 18 18   Temp: 36.8 °C (98.2 °F) 36.8 °C (98.2 °F) 36.7 °C (98 °F) 36.7 °C (98 °F)   SpO2: 98% 94% (!) 68% 93%   Weight:       Height:           Current Facility-Administered Medications   Medication Dose   • morphine (pf) 4  mg/ml injection 4 mg  4 mg   • ondansetron (ZOFRAN) syringe/vial injection 4 mg  4 mg    Or   • ondansetron (ZOFRAN ODT) dispertab 4 mg  4 mg   • diphenhydrAMINE (BENADRYL) tablet/capsule 25 mg  25 mg    Or   • diphenhydrAMINE (BENADRYL) injection 25 mg  25 mg   • ibuprofen (MOTRIN) tablet 600 mg  600 mg   • hydrocodone-acetaminophen (NORCO) 5-325 MG per tablet 1-2 Tab  1-2 Tab   • LR infusion     • PRN oxytocin (PITOCIN) (20 Units/1000 mL) PRN for excessive uterine bleeding - See Admin Instr  125-999 mL/hr   • docusate sodium (COLACE) capsule 100 mg  100 mg   • bisacodyl (DULCOLAX) suppository 10 mg  10 mg   • magnesium hydroxide (MILK OF MAGNESIA) suspension 30 mL  30 mL   • simethicone (MYLICON) chewable tab 80 mg  80 mg   • prenatal plus vitamin (STUARTNATAL 1+1) 27-1 MG tablet 1 Tab  1 Tab   • acetaminophen (TYLENOL) tablet 325 mg  325 mg   • omeprazole (PRILOSEC) capsule 20 mg  20 mg   • gabapentin (NEURONTIN) capsule 300 mg  300 mg       Exam:  Breast Exam: Inspection negative. No nipple discharge or bleeding. No masses or nodularity palpable  Abdomen: Abdomen soft, non-tender. BS normal. No masses,  No organomegaly  Fundus Non Tender: yes  Incision: dry and intact, healing well with good reapproximation, no evidence of infection, separation or keloid formation.  Perineum: perineum intact  Extremity: extremities, peripheral pulses and reflexes normal     Labs:  Recent Labs      09/01/17   1151   WBC  16.5*   RBC  3.56*   HEMOGLOBIN  11.2*   HEMATOCRIT  32.9*   MCV  92.4   MCH  31.5   MCHC  34.0   RDW  46.1   PLATELETCT  208   MPV  11.0        Activity:   Discharge to home  Pelvic Rest x 6 weeks    Assessment:  normal postpartum course  Discharge Assessment: Taking adequate diet and fluids     Follow up: .[facility] or [facility] in 5 weeks for vaginal ; 1 week for incision check.      Discharge Meds:   Current Outpatient Prescriptions   Medication Sig Dispense Refill   • ibuprofen (MOTRIN) 600 MG Tab Take  1 Tab by mouth every 6 hours as needed for Moderate Pain (cramping). 30 Tab 1   • hydrocodone-acetaminophen (NORCO) 5-325 MG Tab per tablet Take 1-2 Tabs by mouth every 6 hours as needed (severe pain). 30 Tab 0       Rolf Medley M.D.

## 2017-09-04 NOTE — PROGRESS NOTES
Staples removed from abdominal surgical incision, pt tolerated well. Covered incision with steri strips, educations done on home care, with emphasis on s/s of infection,  all questions and concerns addressed

## 2017-09-11 ENCOUNTER — POST PARTUM (OUTPATIENT)
Dept: OBGYN | Facility: MEDICAL CENTER | Age: 32
End: 2017-09-11
Payer: COMMERCIAL

## 2017-09-11 VITALS
DIASTOLIC BLOOD PRESSURE: 74 MMHG | BODY MASS INDEX: 39.11 KG/M2 | HEIGHT: 62 IN | SYSTOLIC BLOOD PRESSURE: 110 MMHG | WEIGHT: 212.5 LBS

## 2017-09-11 DIAGNOSIS — Z98.891 STATUS POST PRIMARY LOW TRANSVERSE CESAREAN SECTION: ICD-10-CM

## 2017-09-11 DIAGNOSIS — F41.9 ANXIETY: ICD-10-CM

## 2017-09-11 DIAGNOSIS — Z51.89 VISIT FOR WOUND CHECK: ICD-10-CM

## 2017-09-11 PROBLEM — O13.9 GESTATIONAL HYPERTENSION WITHOUT SIGNIFICANT PROTEINURIA: Status: RESOLVED | Noted: 2017-04-11 | Resolved: 2017-09-11

## 2017-09-11 PROBLEM — Z34.83 SUPERVISION OF NORMAL INTRAUTERINE PREGNANCY IN MULTIGRAVIDA IN THIRD TRIMESTER: Status: RESOLVED | Noted: 2017-08-03 | Resolved: 2017-09-11

## 2017-09-11 PROCEDURE — 99024 POSTOP FOLLOW-UP VISIT: CPT | Performed by: OBSTETRICS & GYNECOLOGY

## 2017-09-11 RX ORDER — HYDROXYZINE PAMOATE 25 MG/1
25 CAPSULE ORAL 3 TIMES DAILY PRN
Qty: 30 CAP | Refills: 1 | Status: ON HOLD | OUTPATIENT
Start: 2017-09-11 | End: 2017-10-09

## 2017-09-11 NOTE — PROGRESS NOTES
"Chief Complaint   Patient presents with   • Postpartum care       History of present illness:   31 y.o. S/p primary LTCS 1 week presents for wound chec  Doing well  No pain  (+) anxiety  No VB  (+) breast feeding  Review of systems:  Pertinent positives documented in HPI and all other systems reviewed & are negative    All PMH, PSH, allergies, social history and FH reviewed and updated today:  Past Medical History:   Diagnosis Date   • HPV in female     DX 2008   • Migraine    • Urinary tract infection, site not specified     bladder infections       Past Surgical History:   Procedure Laterality Date   • PRIMARY C SECTION  9/1/2017    Procedure: PRIMARY C SECTION;  Surgeon: Barbra Ulloa M.D.;  Location: LABOR AND DELIVERY;  Service: Obstetrics       Allergies: No Known Allergies    Social History     Social History   • Marital status:      Spouse name: N/A   • Number of children: N/A   • Years of education: N/A     Occupational History   • Not on file.     Social History Main Topics   • Smoking status: Former Smoker     Types: Cigarettes     Quit date: 8/31/2009   • Smokeless tobacco: Never Used   • Alcohol use No   • Drug use: No   • Sexual activity: Yes     Partners: Male     Other Topics Concern   • Not on file     Social History Narrative   • No narrative on file       History reviewed. No pertinent family history.    Physical exam:  Blood pressure 110/74, height 1.575 m (5' 2\"), weight 96.4 kg (212 lb 8 oz), not currently breastfeeding.    General:appears stated age, is in no apparent distress, is well developed and well nourished  Head: normocephalic, non-tender  Abdomen: Bowel sounds positive, nondistended, soft, nontender x4, no rebound or guarding. No organomegaly. No masses.  INCISION - DRY/CLEAN/INTACT  Skin: No rashes, or ulcers or lesions seen  Psychiatric: Patient shows appropriate affect, is alert and oriented x3, intact judgment and insight.  1. Anxiety  hydrOXYzine (VISTARIL) 25 MG Cap   2. " Visit for wound check     3. Status post primary low transverse  section     4. Continue to breast feed  5. PNV  6. Keep wound dry  7. 6 week PP

## 2017-09-27 ENCOUNTER — POST PARTUM (OUTPATIENT)
Dept: OBGYN | Facility: CLINIC | Age: 32
End: 2017-09-27
Payer: COMMERCIAL

## 2017-09-27 VITALS
WEIGHT: 210 LBS | SYSTOLIC BLOOD PRESSURE: 112 MMHG | DIASTOLIC BLOOD PRESSURE: 72 MMHG | HEIGHT: 62 IN | BODY MASS INDEX: 38.64 KG/M2

## 2017-09-27 DIAGNOSIS — Z51.89 VISIT FOR WOUND CHECK: ICD-10-CM

## 2017-09-27 PROCEDURE — 90050 PR POSTPARTUM VISIT: CPT | Performed by: OBSTETRICS & GYNECOLOGY

## 2017-09-27 NOTE — LETTER
September 27, 2017              Murray Chavez is currently being cared for at [facility].  Patient was seen today for post partum appointment today. Patient may return to work without restrictions on 10/31/17.         Thank you,          Barbra Ulloa M.D.

## 2017-09-27 NOTE — PROGRESS NOTES
"Murray Chavez Is a 31 y.o.  status post  delivery on 17. Patient is here today for an incision check. Currently the patient is without complaints.  She reports Normal  BM.  Normal  appetite without nausea and vomiting. She denies fever. Pain is under good control. Breast feeding.  Minimal lochia.    /72   Ht 1.575 m (5' 2\")   Wt 95.3 kg (210 lb)   Breastfeeding? Yes   BMI 38.41 kg/m²   ABD Abdomen soft, non-tender. BS normal. No masses or organomegaly  Incision well healed, dry and intact      Assessment and plan  Status post  delivery  No complications incision healing well  Followup in 4 weeks for final postpartum checkup        "

## 2017-10-08 ENCOUNTER — APPOINTMENT (OUTPATIENT)
Dept: RADIOLOGY | Facility: MEDICAL CENTER | Age: 32
DRG: 769 | End: 2017-10-08
Attending: EMERGENCY MEDICINE
Payer: COMMERCIAL

## 2017-10-08 ENCOUNTER — HOSPITAL ENCOUNTER (INPATIENT)
Facility: MEDICAL CENTER | Age: 32
LOS: 7 days | DRG: 769 | End: 2017-10-16
Attending: EMERGENCY MEDICINE | Admitting: OBSTETRICS & GYNECOLOGY
Payer: COMMERCIAL

## 2017-10-08 ENCOUNTER — APPOINTMENT (OUTPATIENT)
Dept: RADIOLOGY | Facility: MEDICAL CENTER | Age: 32
DRG: 769 | End: 2017-10-08
Payer: COMMERCIAL

## 2017-10-08 LAB
ALBUMIN SERPL BCP-MCNC: 3.5 G/DL (ref 3.2–4.9)
ALBUMIN/GLOB SERPL: 0.9 G/DL
ALP SERPL-CCNC: 93 U/L (ref 30–99)
ALT SERPL-CCNC: 9 U/L (ref 2–50)
ANION GAP SERPL CALC-SCNC: 11 MMOL/L (ref 0–11.9)
AST SERPL-CCNC: 11 U/L (ref 12–45)
BASOPHILS # BLD AUTO: 0 % (ref 0–1.8)
BASOPHILS # BLD: 0 K/UL (ref 0–0.12)
BILIRUB SERPL-MCNC: 1.2 MG/DL (ref 0.1–1.5)
BUN SERPL-MCNC: 10 MG/DL (ref 8–22)
CALCIUM SERPL-MCNC: 9.5 MG/DL (ref 8.5–10.5)
CHLORIDE SERPL-SCNC: 104 MMOL/L (ref 96–112)
CO2 SERPL-SCNC: 19 MMOL/L (ref 20–33)
CREAT SERPL-MCNC: 0.81 MG/DL (ref 0.5–1.4)
EOSINOPHIL # BLD AUTO: 0.11 K/UL (ref 0–0.51)
EOSINOPHIL NFR BLD: 0.9 % (ref 0–6.9)
ERYTHROCYTE [DISTWIDTH] IN BLOOD BY AUTOMATED COUNT: 42.7 FL (ref 35.9–50)
GFR SERPL CREATININE-BSD FRML MDRD: >60 ML/MIN/1.73 M 2
GLOBULIN SER CALC-MCNC: 3.9 G/DL (ref 1.9–3.5)
GLUCOSE SERPL-MCNC: 107 MG/DL (ref 65–99)
HCG SERPL QL: NEGATIVE
HCT VFR BLD AUTO: 35.3 % (ref 37–47)
HGB BLD-MCNC: 12.4 G/DL (ref 12–16)
LACTATE BLD-SCNC: 1 MMOL/L (ref 0.5–2)
LACTATE BLD-SCNC: 1.4 MMOL/L (ref 0.5–2)
LYMPHOCYTES # BLD AUTO: 1.45 K/UL (ref 1–4.8)
LYMPHOCYTES NFR BLD: 12.3 % (ref 22–41)
MANUAL DIFF BLD: NORMAL
MCH RBC QN AUTO: 31.3 PG (ref 27–33)
MCHC RBC AUTO-ENTMCNC: 35.1 G/DL (ref 33.6–35)
MCV RBC AUTO: 89.1 FL (ref 81.4–97.8)
MONOCYTES # BLD AUTO: 0.11 K/UL (ref 0–0.85)
MONOCYTES NFR BLD AUTO: 0.9 % (ref 0–13.4)
MORPHOLOGY BLD-IMP: NORMAL
NEUTROPHILS # BLD AUTO: 10.14 K/UL (ref 2–7.15)
NEUTROPHILS NFR BLD: 78 % (ref 44–72)
NEUTS BAND NFR BLD MANUAL: 7.9 % (ref 0–10)
NRBC # BLD AUTO: 0 K/UL
NRBC BLD AUTO-RTO: 0 /100 WBC
PLATELET # BLD AUTO: 186 K/UL (ref 164–446)
PLATELET BLD QL SMEAR: NORMAL
PMV BLD AUTO: 10 FL (ref 9–12.9)
POTASSIUM SERPL-SCNC: 3.3 MMOL/L (ref 3.6–5.5)
PROT SERPL-MCNC: 7.4 G/DL (ref 6–8.2)
RBC # BLD AUTO: 3.96 M/UL (ref 4.2–5.4)
RBC BLD AUTO: NORMAL
SODIUM SERPL-SCNC: 134 MMOL/L (ref 135–145)
WBC # BLD AUTO: 11.8 K/UL (ref 4.8–10.8)

## 2017-10-08 PROCEDURE — 700102 HCHG RX REV CODE 250 W/ 637 OVERRIDE(OP): Performed by: EMERGENCY MEDICINE

## 2017-10-08 PROCEDURE — 700105 HCHG RX REV CODE 258: Performed by: EMERGENCY MEDICINE

## 2017-10-08 PROCEDURE — 87086 URINE CULTURE/COLONY COUNT: CPT

## 2017-10-08 PROCEDURE — A9270 NON-COVERED ITEM OR SERVICE: HCPCS | Performed by: EMERGENCY MEDICINE

## 2017-10-08 PROCEDURE — 85007 BL SMEAR W/DIFF WBC COUNT: CPT

## 2017-10-08 PROCEDURE — 74177 CT ABD & PELVIS W/CONTRAST: CPT

## 2017-10-08 PROCEDURE — 87040 BLOOD CULTURE FOR BACTERIA: CPT

## 2017-10-08 PROCEDURE — 99285 EMERGENCY DEPT VISIT HI MDM: CPT

## 2017-10-08 PROCEDURE — 84703 CHORIONIC GONADOTROPIN ASSAY: CPT

## 2017-10-08 PROCEDURE — 700117 HCHG RX CONTRAST REV CODE 255: Performed by: EMERGENCY MEDICINE

## 2017-10-08 PROCEDURE — 81001 URINALYSIS AUTO W/SCOPE: CPT

## 2017-10-08 PROCEDURE — 96375 TX/PRO/DX INJ NEW DRUG ADDON: CPT

## 2017-10-08 PROCEDURE — 80053 COMPREHEN METABOLIC PANEL: CPT

## 2017-10-08 PROCEDURE — 71010 DX-CHEST-PORTABLE (1 VIEW): CPT

## 2017-10-08 PROCEDURE — 700111 HCHG RX REV CODE 636 W/ 250 OVERRIDE (IP): Performed by: EMERGENCY MEDICINE

## 2017-10-08 PROCEDURE — 85027 COMPLETE CBC AUTOMATED: CPT

## 2017-10-08 PROCEDURE — 83605 ASSAY OF LACTIC ACID: CPT | Mod: 91

## 2017-10-08 PROCEDURE — 87077 CULTURE AEROBIC IDENTIFY: CPT | Mod: 91

## 2017-10-08 PROCEDURE — 87181 SC STD AGAR DILUTION PER AGT: CPT

## 2017-10-08 RX ORDER — SODIUM CHLORIDE 9 MG/ML
30 INJECTION, SOLUTION INTRAVENOUS
Status: COMPLETED | OUTPATIENT
Start: 2017-10-08 | End: 2017-10-08

## 2017-10-08 RX ORDER — ONDANSETRON 2 MG/ML
4 INJECTION INTRAMUSCULAR; INTRAVENOUS ONCE
Status: COMPLETED | OUTPATIENT
Start: 2017-10-08 | End: 2017-10-08

## 2017-10-08 RX ORDER — MORPHINE SULFATE 4 MG/ML
4 INJECTION, SOLUTION INTRAMUSCULAR; INTRAVENOUS ONCE
Status: COMPLETED | OUTPATIENT
Start: 2017-10-08 | End: 2017-10-08

## 2017-10-08 RX ORDER — ACETAMINOPHEN 325 MG/1
650 TABLET ORAL ONCE
Status: COMPLETED | OUTPATIENT
Start: 2017-10-08 | End: 2017-10-08

## 2017-10-08 RX ORDER — CEFTRIAXONE 2 G/1
2 INJECTION, POWDER, FOR SOLUTION INTRAMUSCULAR; INTRAVENOUS ONCE
Status: COMPLETED | OUTPATIENT
Start: 2017-10-08 | End: 2017-10-08

## 2017-10-08 RX ADMIN — IOHEXOL 100 ML: 350 INJECTION, SOLUTION INTRAVENOUS at 22:45

## 2017-10-08 RX ADMIN — MORPHINE SULFATE 4 MG: 4 INJECTION INTRAVENOUS at 21:46

## 2017-10-08 RX ADMIN — SODIUM CHLORIDE 2910 ML: 9 INJECTION, SOLUTION INTRAVENOUS at 21:10

## 2017-10-08 RX ADMIN — ONDANSETRON 4 MG: 2 INJECTION INTRAMUSCULAR; INTRAVENOUS at 21:46

## 2017-10-08 RX ADMIN — CEFTRIAXONE SODIUM 2 G: 2 INJECTION, POWDER, FOR SOLUTION INTRAMUSCULAR; INTRAVENOUS at 22:20

## 2017-10-08 RX ADMIN — ACETAMINOPHEN 650 MG: 325 TABLET, FILM COATED ORAL at 21:53

## 2017-10-08 ASSESSMENT — PAIN SCALES - GENERAL: PAINLEVEL_OUTOF10: 10

## 2017-10-09 ENCOUNTER — APPOINTMENT (OUTPATIENT)
Dept: RADIOLOGY | Facility: MEDICAL CENTER | Age: 32
DRG: 769 | End: 2017-10-09
Attending: OBSTETRICS & GYNECOLOGY
Payer: COMMERCIAL

## 2017-10-09 PROBLEM — R10.9 ABDOMINAL PAIN: Status: ACTIVE | Noted: 2017-10-09

## 2017-10-09 PROBLEM — A41.9 SEPSIS (HCC): Status: ACTIVE | Noted: 2017-10-09

## 2017-10-09 LAB
APPEARANCE UR: CLEAR
BACTERIA #/AREA URNS HPF: NEGATIVE /HPF
BILIRUB UR QL STRIP.AUTO: NEGATIVE
COLOR UR: YELLOW
EPI CELLS #/AREA URNS HPF: ABNORMAL /HPF
FLUAV+FLUBV AG SPEC QL IA: NORMAL
GLUCOSE UR STRIP.AUTO-MCNC: NEGATIVE MG/DL
HYALINE CASTS #/AREA URNS LPF: ABNORMAL /LPF
KETONES UR STRIP.AUTO-MCNC: NEGATIVE MG/DL
LACTATE BLD-SCNC: 0.9 MMOL/L (ref 0.5–2)
LEUKOCYTE ESTERASE UR QL STRIP.AUTO: ABNORMAL
MICRO URNS: ABNORMAL
NITRITE UR QL STRIP.AUTO: NEGATIVE
PH UR STRIP.AUTO: 5.5 [PH]
PROT UR QL STRIP: NEGATIVE MG/DL
RBC # URNS HPF: ABNORMAL /HPF
RBC UR QL AUTO: ABNORMAL
SIGNIFICANT IND 70042: NORMAL
SITE SITE: NORMAL
SOURCE SOURCE: NORMAL
SP GR UR STRIP.AUTO: 1.01
UROBILINOGEN UR STRIP.AUTO-MCNC: 0.2 MG/DL
WBC #/AREA URNS HPF: ABNORMAL /HPF

## 2017-10-09 PROCEDURE — 700102 HCHG RX REV CODE 250 W/ 637 OVERRIDE(OP): Performed by: OBSTETRICS & GYNECOLOGY

## 2017-10-09 PROCEDURE — 96365 THER/PROPH/DIAG IV INF INIT: CPT

## 2017-10-09 PROCEDURE — 700111 HCHG RX REV CODE 636 W/ 250 OVERRIDE (IP): Performed by: OBSTETRICS & GYNECOLOGY

## 2017-10-09 PROCEDURE — 87400 INFLUENZA A/B EACH AG IA: CPT

## 2017-10-09 PROCEDURE — 76830 TRANSVAGINAL US NON-OB: CPT

## 2017-10-09 PROCEDURE — 700111 HCHG RX REV CODE 636 W/ 250 OVERRIDE (IP): Performed by: EMERGENCY MEDICINE

## 2017-10-09 PROCEDURE — 96376 TX/PRO/DX INJ SAME DRUG ADON: CPT

## 2017-10-09 PROCEDURE — 83605 ASSAY OF LACTIC ACID: CPT

## 2017-10-09 PROCEDURE — 700105 HCHG RX REV CODE 258: Performed by: EMERGENCY MEDICINE

## 2017-10-09 PROCEDURE — 700105 HCHG RX REV CODE 258: Performed by: PHARMACIST

## 2017-10-09 PROCEDURE — A9270 NON-COVERED ITEM OR SERVICE: HCPCS | Performed by: OBSTETRICS & GYNECOLOGY

## 2017-10-09 PROCEDURE — 700112 HCHG RX REV CODE 229: Performed by: OBSTETRICS & GYNECOLOGY

## 2017-10-09 PROCEDURE — 700111 HCHG RX REV CODE 636 W/ 250 OVERRIDE (IP): Performed by: PHARMACIST

## 2017-10-09 PROCEDURE — 700101 HCHG RX REV CODE 250: Performed by: EMERGENCY MEDICINE

## 2017-10-09 PROCEDURE — 700105 HCHG RX REV CODE 258: Performed by: OBSTETRICS & GYNECOLOGY

## 2017-10-09 PROCEDURE — 770006 HCHG ROOM/CARE - MED/SURG/GYN SEMI*

## 2017-10-09 RX ORDER — VITAMIN A ACETATE, BETA CAROTENE, ASCORBIC ACID, CHOLECALCIFEROL, .ALPHA.-TOCOPHEROL ACETATE, DL-, THIAMINE MONONITRATE, RIBOFLAVIN, NIACINAMIDE, PYRIDOXINE HYDROCHLORIDE, FOLIC ACID, CYANOCOBALAMIN, CALCIUM CARBONATE, FERROUS FUMARATE, ZINC OXIDE, CUPRIC OXIDE 3080; 12; 120; 400; 1; 1.84; 3; 20; 22; 920; 25; 200; 27; 10; 2 [IU]/1; UG/1; MG/1; [IU]/1; MG/1; MG/1; MG/1; MG/1; MG/1; [IU]/1; MG/1; MG/1; MG/1; MG/1; MG/1
1 TABLET, FILM COATED ORAL DAILY
Status: DISCONTINUED | OUTPATIENT
Start: 2017-10-10 | End: 2017-10-16 | Stop reason: HOSPADM

## 2017-10-09 RX ORDER — GABAPENTIN 300 MG/1
600 CAPSULE ORAL 2 TIMES DAILY
Status: DISCONTINUED | OUTPATIENT
Start: 2017-10-09 | End: 2017-10-16 | Stop reason: HOSPADM

## 2017-10-09 RX ORDER — HYDROCODONE BITARTRATE AND ACETAMINOPHEN 5; 325 MG/1; MG/1
1-2 TABLET ORAL EVERY 6 HOURS PRN
Status: DISCONTINUED | OUTPATIENT
Start: 2017-10-09 | End: 2017-10-11

## 2017-10-09 RX ORDER — MORPHINE SULFATE 4 MG/ML
4 INJECTION, SOLUTION INTRAMUSCULAR; INTRAVENOUS ONCE
Status: COMPLETED | OUTPATIENT
Start: 2017-10-09 | End: 2017-10-09

## 2017-10-09 RX ORDER — HYDROCODONE BITARTRATE AND ACETAMINOPHEN 5; 325 MG/1; MG/1
1 TABLET ORAL SEE ADMIN INSTRUCTIONS
Status: ON HOLD | COMMUNITY
End: 2017-10-14

## 2017-10-09 RX ORDER — MORPHINE SULFATE 4 MG/ML
4 INJECTION, SOLUTION INTRAMUSCULAR; INTRAVENOUS
Status: DISCONTINUED | OUTPATIENT
Start: 2017-10-09 | End: 2017-10-09

## 2017-10-09 RX ORDER — IBUPROFEN 800 MG/1
800 TABLET ORAL EVERY 8 HOURS PRN
Status: DISCONTINUED | OUTPATIENT
Start: 2017-10-09 | End: 2017-10-16 | Stop reason: HOSPADM

## 2017-10-09 RX ORDER — IBUPROFEN 600 MG/1
600 TABLET ORAL EVERY 12 HOURS PRN
Status: ON HOLD | COMMUNITY
End: 2017-10-14

## 2017-10-09 RX ORDER — ONDANSETRON 2 MG/ML
4 INJECTION INTRAMUSCULAR; INTRAVENOUS EVERY 6 HOURS PRN
Status: DISCONTINUED | OUTPATIENT
Start: 2017-10-09 | End: 2017-10-16 | Stop reason: HOSPADM

## 2017-10-09 RX ORDER — DOCUSATE SODIUM 100 MG/1
100 CAPSULE, LIQUID FILLED ORAL 2 TIMES DAILY
Status: DISCONTINUED | OUTPATIENT
Start: 2017-10-09 | End: 2017-10-16 | Stop reason: HOSPADM

## 2017-10-09 RX ORDER — HYDROXYZINE HYDROCHLORIDE 25 MG/1
50 TABLET, FILM COATED ORAL 3 TIMES DAILY PRN
Status: DISCONTINUED | OUTPATIENT
Start: 2017-10-09 | End: 2017-10-16 | Stop reason: HOSPADM

## 2017-10-09 RX ORDER — BISACODYL 5 MG
10 TABLET, DELAYED RELEASE (ENTERIC COATED) ORAL
COMMUNITY

## 2017-10-09 RX ORDER — ACETAMINOPHEN 500 MG
1000 TABLET ORAL EVERY 12 HOURS PRN
Status: ON HOLD | COMMUNITY
End: 2017-10-14

## 2017-10-09 RX ORDER — SODIUM CHLORIDE 9 MG/ML
1000 INJECTION, SOLUTION INTRAVENOUS ONCE
Status: COMPLETED | OUTPATIENT
Start: 2017-10-09 | End: 2017-10-09

## 2017-10-09 RX ORDER — DEXTROSE AND SODIUM CHLORIDE 5; .9 G/100ML; G/100ML
INJECTION, SOLUTION INTRAVENOUS CONTINUOUS
Status: DISCONTINUED | OUTPATIENT
Start: 2017-10-09 | End: 2017-10-16 | Stop reason: HOSPADM

## 2017-10-09 RX ADMIN — HYDROCODONE BITARTRATE AND ACETAMINOPHEN 1 TABLET: 5; 325 TABLET ORAL at 13:15

## 2017-10-09 RX ADMIN — MORPHINE SULFATE 4 MG: 4 INJECTION INTRAVENOUS at 05:33

## 2017-10-09 RX ADMIN — DEXTROSE AND SODIUM CHLORIDE: 5; 900 INJECTION, SOLUTION INTRAVENOUS at 11:51

## 2017-10-09 RX ADMIN — HYDROMORPHONE HYDROCHLORIDE 1 MG: 1 INJECTION, SOLUTION INTRAMUSCULAR; INTRAVENOUS; SUBCUTANEOUS at 17:25

## 2017-10-09 RX ADMIN — IBUPROFEN 800 MG: 800 TABLET, FILM COATED ORAL at 18:37

## 2017-10-09 RX ADMIN — TAZOBACTAM SODIUM AND PIPERACILLIN SODIUM 3.38 G: 375; 3 INJECTION, SOLUTION INTRAVENOUS at 17:19

## 2017-10-09 RX ADMIN — HYDROCODONE BITARTRATE AND ACETAMINOPHEN 2 TABLET: 5; 325 TABLET ORAL at 22:33

## 2017-10-09 RX ADMIN — MORPHINE SULFATE 4 MG: 4 INJECTION INTRAVENOUS at 08:10

## 2017-10-09 RX ADMIN — MORPHINE SULFATE 4 MG: 4 INJECTION INTRAVENOUS at 01:09

## 2017-10-09 RX ADMIN — SODIUM CHLORIDE 1000 ML: 9 INJECTION, SOLUTION INTRAVENOUS at 03:56

## 2017-10-09 RX ADMIN — TAZOBACTAM SODIUM AND PIPERACILLIN SODIUM 3.38 G: 375; 3 INJECTION, SOLUTION INTRAVENOUS at 13:15

## 2017-10-09 RX ADMIN — METRONIDAZOLE 500 MG: 500 INJECTION, SOLUTION INTRAVENOUS at 05:27

## 2017-10-09 RX ADMIN — MORPHINE SULFATE 4 MG: 4 INJECTION INTRAVENOUS at 11:51

## 2017-10-09 RX ADMIN — DOCUSATE SODIUM 100 MG: 100 CAPSULE ORAL at 22:19

## 2017-10-09 RX ADMIN — GABAPENTIN 600 MG: 300 CAPSULE ORAL at 18:37

## 2017-10-09 RX ADMIN — METRONIDAZOLE 500 MG: 500 INJECTION, SOLUTION INTRAVENOUS at 00:41

## 2017-10-09 RX ADMIN — VANCOMYCIN HYDROCHLORIDE 2400 MG: 100 INJECTION, POWDER, LYOPHILIZED, FOR SOLUTION INTRAVENOUS at 18:37

## 2017-10-09 RX ADMIN — HYDROXYZINE HYDROCHLORIDE 50 MG: 25 TABLET ORAL at 22:33

## 2017-10-09 ASSESSMENT — PAIN SCALES - GENERAL
PAINLEVEL_OUTOF10: 8
PAINLEVEL_OUTOF10: 3
PAINLEVEL_OUTOF10: ASSUMED PAIN PRESENT
PAINLEVEL_OUTOF10: 5
PAINLEVEL_OUTOF10: 6
PAINLEVEL_OUTOF10: 2
PAINLEVEL_OUTOF10: 7

## 2017-10-09 ASSESSMENT — PATIENT HEALTH QUESTIONNAIRE - PHQ9
2. FEELING DOWN, DEPRESSED, IRRITABLE, OR HOPELESS: NOT AT ALL
1. LITTLE INTEREST OR PLEASURE IN DOING THINGS: NOT AT ALL
SUM OF ALL RESPONSES TO PHQ QUESTIONS 1-9: 0
SUM OF ALL RESPONSES TO PHQ9 QUESTIONS 1 AND 2: 0

## 2017-10-09 ASSESSMENT — LIFESTYLE VARIABLES
ALCOHOL_USE: NO
EVER_SMOKED: YES

## 2017-10-09 NOTE — DIETARY
"Nutrition Services    Pt is a 32 yo female with adm dx: Sepsis, Abdominal pain  Past Medical History:   Diagnosis Date   • HPV in female     DX    • Migraine    • Urinary tract infection, site not specified     bladder infections     Per chart review, pt noted with having an emergent  on 17 and \"now with abdominal pain and findings on pelvic ultrasound concerning for left tubal-ovarian abscess.\"  Per chart review, pt denies having any changes with her appetite.  She is currently on a clear liquid diet, no meals have been recorded in ADLs at this time.     Advance diet as pt is able and Record percentage of meals conusmed in ADLs to help monitor po adequacy.     RD will con't to monitor per dept policy   "

## 2017-10-09 NOTE — ED NOTES
Chief Complaint   Patient presents with   • Abdominal Pain     Pt reports symptoms started Thursday after long car ride. States pain starts in mid lower abd and radiates up. Pt had emergent  on .   • Fever     Since Thursday   • Chills     /73   Pulse (!) 145   Temp (!) 39.1 °C (102.4 °F) (Oral)   Resp 18   Wt 97 kg (213 lb 13.5 oz)   SpO2 98%   BMI 39.11 kg/m²     Pt ambulated into triage, complaints as above. Sepsis score of 3, protocol initiated. Charge RN notified. VS as above, pt tearful and reassurances given, encouraged to return to the triage nurse or tech with any new complaints or symptoms.

## 2017-10-09 NOTE — PROGRESS NOTES
Received report on pt. Pt arrived to unit. VSS. No visible signs of distress. No complaints of n/v or SOB at this time. Oriented to call light and room. All questions answered. Call light within reach and able to make all needs be known. Will continue to monitor.

## 2017-10-09 NOTE — PROGRESS NOTES
Hospital Day #0      Murray Chavez is a 31 y.o. year old female  s/p second stage  section on 17 for transverse arrest after one hour of pushing who presented to the ED this morning for worsening abdominal pain.  She reports the pain starting on Thursday on the left side, and progressively worsening and radiating up her abdomen.  She reports fevers and chills.  She had some vomiting on Thursday, but none since.  The pain is worst, 10/10 with position change.  It is 5/10 when lying in bed. Reports normal flatus, appetite, BM.  No foul vaginal dicharge or lochia.  Denies dysuria, urgency, or frequency.  Breast feeding.       ROS: Pertinent items are noted in HPI.    Past Medical History:   Diagnosis Date   • HPV in female     DX    • Migraine    • Urinary tract infection, site not specified     bladder infections     Past Surgical History:   Procedure Laterality Date   • PRIMARY C SECTION  2017    Procedure: PRIMARY C SECTION;  Surgeon: Barbra Ulloa M.D.;  Location: LABOR AND DELIVERY;  Service: Obstetrics     OB History    Para Term  AB Living   3 3 3     3   SAB TAB Ectopic Molar Multiple Live Births             3      # Outcome Date GA Lbr Severo/2nd Weight Sex Delivery Anes PTL Lv   3 Term 17 39w5d  3.39 kg (7 lb 7.6 oz) F CS-Unspec Spinal N SHAMIR   2 Term 11 40w0d  3.714 kg (8 lb 3 oz) M Vag-Spont None N SHAMIR   1 Term 10/06/07 40w0d  2.892 kg (6 lb 6 oz) F Vag-Spont EPI N SHAMIR        Social History     Social History   • Marital status:      Spouse name: N/A   • Number of children: N/A   • Years of education: N/A     Occupational History   • Not on file.     Social History Main Topics   • Smoking status: Former Smoker     Types: Cigarettes     Quit date: 2009   • Smokeless tobacco: Never Used   • Alcohol use No   • Drug use: No   • Sexual activity: Yes     Partners: Male     Other Topics Concern   • Not on file     Social History Narrative   • No  "narrative on file     Allergies: Review of patient's allergies indicates no known allergies.    Current Facility-Administered Medications:   •  morphine (pf) 4 mg/ml injection 4 mg, 4 mg, Intravenous, Q2HRS PRN, Leodan Kuo M.D., 4 mg at 10/09/17 1151  •  ondansetron (ZOFRAN) syringe/vial injection 4 mg, 4 mg, Intravenous, Q6HRS PRN, Leodan Kou M.D.  •  D5 NS infusion, , Intravenous, Continuous, Barbra Ulloa M.D., Last Rate: 125 mL/hr at 10/09/17 1151  •  piperacillin-tazobactam (ZOSYN) IVPB premix 3.375 g, 3.375 g, Intravenous, Q6HRS, Barbra Ulloa M.D.  •  hydrocodone-acetaminophen (NORCO) 5-325 MG per tablet 1-2 Tab, 1-2 Tab, Oral, Q6HRS PRN, Barbra Ulloa M.D.      Patient Active Problem List    Diagnosis Date Noted   • Sepsis ([facility]) 10/09/2017   • Abdominal pain 10/09/2017   • Migraines 04/11/2017               Objective:      Blood pressure (!) 95/57, pulse (!) 109, temperature 37.3 °C (99.1 °F), resp. rate 18, height 1.575 m (5' 2\"), weight 97 kg (213 lb 13.5 oz), last menstrual period 12/09/2016, SpO2 91 %, currently breastfeeding.    General:   no acute distress   Skin:   normal   HEENT:  PERRLA   Mood Anxious, tearful       Abdomen:   soft, mildly distended, BS active.  + voluntary guarding.  No rebound. No palpable masses.  Tender diffusely, L>R   EXT No edema, calves, NT                                                  Lab Review  Lab:      Recent Results (from the past 5880 hour(s))   POCT US - In Clinic OB Scan    Collection Time: 02/13/17  9:43 AM   Result Value Ref Range    In Clinic OB Scan     THINPREP RFLX HPV ASCUS W/CTNG    Collection Time: 03/17/17  9:07 AM   Result Value Ref Range    Cytology Reg See Path Report     Source Cervical     C. trachomatis by PCR Negative Negative    N. gonorrhoeae by PCR Negative Negative   BUN    Collection Time: 04/06/17 12:00 AM   Result Value Ref Range    Bun 7    CREATININE    Collection Time: 04/06/17 12:00 AM   Result Value Ref Range    " Creatinine 0.6    PLATELET COUNT    Collection Time: 04/06/17 12:00 AM   Result Value Ref Range    Platelet Count 303    RUBELLA ABS IGG    Collection Time: 04/06/17 12:00 AM   Result Value Ref Range    Rubella IgG Antibody IMMUNE    HCT    Collection Time: 04/06/17 12:00 AM   Result Value Ref Range    Hematocrit 36.1    HGB    Collection Time: 04/06/17 12:00 AM   Result Value Ref Range    Hemoglobin 12.5    HIV ANTIBODIES    Collection Time: 04/06/17 12:00 AM   Result Value Ref Range    HIV 1,0,2 IC NON REACTIVE    HEP B SURFACE ANTIGEN    Collection Time: 04/06/17 12:00 AM   Result Value Ref Range    Hepatitis B Surface Antigen NON REACTIVE    PROTEIN/CREAT RATIO URINE    Collection Time: 06/20/17 10:50 AM   Result Value Ref Range    Total Protein, Urine 4.2 0.0 - 15.0 mg/dL    Creatinine, Random Urine 48.30 mg/dL    Protein Creatinine Ratio 87 10 - 107 mg/g   POC UA    Collection Time: 06/20/17 11:15 AM   Result Value Ref Range    POC Color Yellow     POC Appearance Clear     POC Glucose Negative Negative mg/dL    POC Ketones Negative Negative mg/dL    POC Specific Gravity 1.010 1.005 - 1.030    POC Blood Negative Negative    POC Urine PH 5.5 5.0 - 8.0    POC Protein Negative Negative mg/dL    POC Nitrites Negative Negative    POC Leukocyte Esterase Negative Negative   CBC WITH DIFFERENTIAL    Collection Time: 06/20/17 11:55 AM   Result Value Ref Range    WBC 13.3 (H) 4.8 - 10.8 K/uL    RBC 3.86 (L) 4.20 - 5.40 M/uL    Hemoglobin 12.0 12.0 - 16.0 g/dL    Hematocrit 35.2 (L) 37.0 - 47.0 %    MCV 91.2 81.4 - 97.8 fL    MCH 31.1 27.0 - 33.0 pg    MCHC 34.1 33.6 - 35.0 g/dL    RDW 42.8 35.9 - 50.0 fL    Platelet Count 287 164 - 446 K/uL    MPV 10.2 9.0 - 12.9 fL    Neutrophils-Polys 74.40 (H) 44.00 - 72.00 %    Lymphocytes 16.90 (L) 22.00 - 41.00 %    Monocytes 6.30 0.00 - 13.40 %    Eosinophils 0.50 0.00 - 6.90 %    Basophils 0.50 0.00 - 1.80 %    Immature Granulocytes 1.40 (H) 0.00 - 0.90 %    Nucleated RBC 0.00  /100 WBC    Neutrophils (Absolute) 9.88 (H) 2.00 - 7.15 K/uL    Lymphs (Absolute) 2.24 1.00 - 4.80 K/uL    Monos (Absolute) 0.84 0.00 - 0.85 K/uL    Eos (Absolute) 0.06 0.00 - 0.51 K/uL    Baso (Absolute) 0.06 0.00 - 0.12 K/uL    Immature Granulocytes (abs) 0.18 (H) 0.00 - 0.11 K/uL    NRBC (Absolute) 0.00 K/uL   COMP METABOLIC PANEL    Collection Time: 06/20/17 11:55 AM   Result Value Ref Range    Sodium 137 135 - 145 mmol/L    Potassium 3.6 3.6 - 5.5 mmol/L    Chloride 109 96 - 112 mmol/L    Co2 18 (L) 20 - 33 mmol/L    Anion Gap 10.0 0.0 - 11.9    Glucose 109 (H) 65 - 99 mg/dL    Bun 6 (L) 8 - 22 mg/dL    Creatinine 0.54 0.50 - 1.40 mg/dL    Calcium 9.2 8.5 - 10.5 mg/dL    AST(SGOT) 12 12 - 45 U/L    ALT(SGPT) 10 2 - 50 U/L    Alkaline Phosphatase 82 30 - 99 U/L    Total Bilirubin 0.4 0.1 - 1.5 mg/dL    Albumin 3.3 3.2 - 4.9 g/dL    Total Protein 6.3 6.0 - 8.2 g/dL    Globulin 3.0 1.9 - 3.5 g/dL    A-G Ratio 1.1 g/dL   URIC ACID    Collection Time: 06/20/17 11:55 AM   Result Value Ref Range    Uric Acid 4.5 1.9 - 8.2 mg/dL   ESTIMATED GFR    Collection Time: 06/20/17 11:55 AM   Result Value Ref Range    GFR If African American >60 >60 mL/min/1.73 m 2    GFR If Non African American >60 >60 mL/min/1.73 m 2   GLUCOSE 1HR GESTATIONAL    Collection Time: 07/07/17 10:36 AM   Result Value Ref Range    Glucose, Post Dose 96 70 - 139 mg/dL   CBC WITH DIFFERENTIAL    Collection Time: 07/07/17 10:36 AM   Result Value Ref Range    WBC 16.3 (H) 4.8 - 10.8 K/uL    RBC 4.25 4.20 - 5.40 M/uL    Hemoglobin 13.3 12.0 - 16.0 g/dL    Hematocrit 40.7 37.0 - 47.0 %    MCV 95.8 81.4 - 97.8 fL    MCH 31.3 27.0 - 33.0 pg    MCHC 32.7 (L) 33.6 - 35.0 g/dL    RDW 46.5 35.9 - 50.0 fL    Platelet Count 312 164 - 446 K/uL    MPV 11.0 9.0 - 12.9 fL    Neutrophils-Polys 77.90 (H) 44.00 - 72.00 %    Lymphocytes 13.90 (L) 22.00 - 41.00 %    Monocytes 6.50 0.00 - 13.40 %    Eosinophils 0.50 0.00 - 6.90 %    Basophils 0.20 0.00 - 1.80 %    Immature  Granulocytes 1.00 (H) 0.00 - 0.90 %    Nucleated RBC 0.00 /100 WBC    Neutrophils (Absolute) 12.70 (H) 2.00 - 7.15 K/uL    Lymphs (Absolute) 2.27 1.00 - 4.80 K/uL    Monos (Absolute) 1.06 (H) 0.00 - 0.85 K/uL    Eos (Absolute) 0.08 0.00 - 0.51 K/uL    Baso (Absolute) 0.04 0.00 - 0.12 K/uL    Immature Granulocytes (abs) 0.17 (H) 0.00 - 0.11 K/uL    NRBC (Absolute) 0.00 K/uL   T.PALLIDUM AB EIA    Collection Time: 07/07/17 10:36 AM   Result Value Ref Range    Syphilis, Treponemal Qual Non Reactive Non Reactive   GRP B STREP, BY PCR ([facility])    Collection Time: 08/03/17  9:00 AM   Result Value Ref Range    Strep Gp B DNA PCR Negative Negative   FERN TEST    Collection Time: 08/10/17  8:30 PM   Result Value Ref Range    Fern Test On Amniotic Fluid see below Not present   POC UA    Collection Time: 08/10/17  8:34 PM   Result Value Ref Range    POC Color Yellow     POC Appearance Clear     POC Glucose Negative Negative mg/dL    POC Ketones Trace (A) Negative mg/dL    POC Specific Gravity 1.015 1.005 - 1.030    POC Blood Negative Negative    POC Urine PH 6.0 5.0 - 8.0    POC Protein Negative Negative mg/dL    POC Nitrites Negative Negative    POC Leukocyte Esterase Negative Negative   FERN TEST    Collection Time: 08/15/17  4:40 PM   Result Value Ref Range    Fern Test On Amniotic Fluid see below Not present   CBC WITH DIFFERENTIAL    Collection Time: 08/21/17 10:00 AM   Result Value Ref Range    WBC 11.0 (H) 4.8 - 10.8 K/uL    RBC 4.12 (L) 4.20 - 5.40 M/uL    Hemoglobin 12.9 12.0 - 16.0 g/dL    Hematocrit 37.6 37.0 - 47.0 %    MCV 91.3 81.4 - 97.8 fL    MCH 31.3 27.0 - 33.0 pg    MCHC 34.3 33.6 - 35.0 g/dL    RDW 45.8 35.9 - 50.0 fL    Platelet Count 258 164 - 446 K/uL    MPV 10.9 9.0 - 12.9 fL    Neutrophils-Polys 73.00 (H) 44.00 - 72.00 %    Lymphocytes 16.90 (L) 22.00 - 41.00 %    Monocytes 8.70 0.00 - 13.40 %    Eosinophils 0.50 0.00 - 6.90 %    Basophils 0.10 0.00 - 1.80 %    Immature Granulocytes 0.80 0.00 - 0.90  %    Nucleated RBC 0.00 /100 WBC    Neutrophils (Absolute) 8.05 (H) 2.00 - 7.15 K/uL    Lymphs (Absolute) 1.86 1.00 - 4.80 K/uL    Monos (Absolute) 0.96 (H) 0.00 - 0.85 K/uL    Eos (Absolute) 0.05 0.00 - 0.51 K/uL    Baso (Absolute) 0.01 0.00 - 0.12 K/uL    Immature Granulocytes (abs) 0.09 0.00 - 0.11 K/uL    NRBC (Absolute) 0.00 K/uL   COMP METABOLIC PANEL    Collection Time: 08/21/17 10:00 AM   Result Value Ref Range    Sodium 136 135 - 145 mmol/L    Potassium 3.8 3.6 - 5.5 mmol/L    Chloride 109 96 - 112 mmol/L    Co2 16 (L) 20 - 33 mmol/L    Anion Gap 11.0 0.0 - 11.9    Glucose 95 65 - 99 mg/dL    Bun 5 (L) 8 - 22 mg/dL    Creatinine 0.51 0.50 - 1.40 mg/dL    Calcium 9.7 8.5 - 10.5 mg/dL    AST(SGOT) 14 12 - 45 U/L    ALT(SGPT) 9 2 - 50 U/L    Alkaline Phosphatase 121 (H) 30 - 99 U/L    Total Bilirubin 0.7 0.1 - 1.5 mg/dL    Albumin 3.2 3.2 - 4.9 g/dL    Total Protein 6.4 6.0 - 8.2 g/dL    Globulin 3.2 1.9 - 3.5 g/dL    A-G Ratio 1.0 g/dL   URIC ACID    Collection Time: 08/21/17 10:00 AM   Result Value Ref Range    Uric Acid 5.4 1.9 - 8.2 mg/dL   ESTIMATED GFR    Collection Time: 08/21/17 10:00 AM   Result Value Ref Range    GFR If African American >60 >60 mL/min/1.73 m 2    GFR If Non African American >60 >60 mL/min/1.73 m 2   Hold Blood Bank Specimen (Not Tested)    Collection Time: 08/31/17 10:04 AM   Result Value Ref Range    Holding Tube - Bb DONE    CBC WITH DIFFERENTIAL    Collection Time: 08/31/17 10:06 AM   Result Value Ref Range    WBC 11.1 (H) 4.8 - 10.8 K/uL    RBC 3.90 (L) 4.20 - 5.40 M/uL    Hemoglobin 12.3 12.0 - 16.0 g/dL    Hematocrit 35.6 (L) 37.0 - 47.0 %    MCV 91.3 81.4 - 97.8 fL    MCH 31.5 27.0 - 33.0 pg    MCHC 34.6 33.6 - 35.0 g/dL    RDW 45.6 35.9 - 50.0 fL    Platelet Count 228 164 - 446 K/uL    MPV 11.0 9.0 - 12.9 fL    Neutrophils-Polys 75.10 (H) 44.00 - 72.00 %    Lymphocytes 16.00 (L) 22.00 - 41.00 %    Monocytes 7.30 0.00 - 13.40 %    Eosinophils 0.50 0.00 - 6.90 %    Basophils  0.30 0.00 - 1.80 %    Immature Granulocytes 0.80 0.00 - 0.90 %    Nucleated RBC 0.00 /100 WBC    Neutrophils (Absolute) 8.36 (H) 2.00 - 7.15 K/uL    Lymphs (Absolute) 1.78 1.00 - 4.80 K/uL    Monos (Absolute) 0.81 0.00 - 0.85 K/uL    Eos (Absolute) 0.06 0.00 - 0.51 K/uL    Baso (Absolute) 0.03 0.00 - 0.12 K/uL    Immature Granulocytes (abs) 0.09 0.00 - 0.11 K/uL    NRBC (Absolute) 0.00 K/uL   COMP METABOLIC PANEL    Collection Time: 08/31/17 10:06 AM   Result Value Ref Range    Sodium 136 135 - 145 mmol/L    Potassium 3.9 3.6 - 5.5 mmol/L    Chloride 109 96 - 112 mmol/L    Co2 18 (L) 20 - 33 mmol/L    Anion Gap 9.0 0.0 - 11.9    Glucose 75 65 - 99 mg/dL    Bun 4 (L) 8 - 22 mg/dL    Creatinine 0.50 0.50 - 1.40 mg/dL    Calcium 9.6 8.5 - 10.5 mg/dL    AST(SGOT) 14 12 - 45 U/L    ALT(SGPT) 7 2 - 50 U/L    Alkaline Phosphatase 110 (H) 30 - 99 U/L    Total Bilirubin 0.7 0.1 - 1.5 mg/dL    Albumin 3.1 (L) 3.2 - 4.9 g/dL    Total Protein 6.0 6.0 - 8.2 g/dL    Globulin 2.9 1.9 - 3.5 g/dL    A-G Ratio 1.1 g/dL   URIC ACID    Collection Time: 08/31/17 10:06 AM   Result Value Ref Range    Uric Acid 5.4 1.9 - 8.2 mg/dL   ESTIMATED GFR    Collection Time: 08/31/17 10:06 AM   Result Value Ref Range    GFR If African American >60 >60 mL/min/1.73 m 2    GFR If Non African American >60 >60 mL/min/1.73 m 2   CBC without differential    Collection Time: 09/01/17 11:51 AM   Result Value Ref Range    WBC 16.5 (H) 4.8 - 10.8 K/uL    RBC 3.56 (L) 4.20 - 5.40 M/uL    Hemoglobin 11.2 (L) 12.0 - 16.0 g/dL    Hematocrit 32.9 (L) 37.0 - 47.0 %    MCV 92.4 81.4 - 97.8 fL    MCH 31.5 27.0 - 33.0 pg    MCHC 34.0 33.6 - 35.0 g/dL    RDW 46.1 35.9 - 50.0 fL    Platelet Count 208 164 - 446 K/uL    MPV 11.0 9.0 - 12.9 fL   Lactic acid (lactate)    Collection Time: 10/08/17  8:00 PM   Result Value Ref Range    Lactic Acid 1.4 0.5 - 2.0 mmol/L   CBC WITH DIFFERENTIAL    Collection Time: 10/08/17  8:00 PM   Result Value Ref Range    WBC 11.8 (H) 4.8 -  10.8 K/uL    RBC 3.96 (L) 4.20 - 5.40 M/uL    Hemoglobin 12.4 12.0 - 16.0 g/dL    Hematocrit 35.3 (L) 37.0 - 47.0 %    MCV 89.1 81.4 - 97.8 fL    MCH 31.3 27.0 - 33.0 pg    MCHC 35.1 (H) 33.6 - 35.0 g/dL    RDW 42.7 35.9 - 50.0 fL    Platelet Count 186 164 - 446 K/uL    MPV 10.0 9.0 - 12.9 fL    Nucleated RBC 0.00 /100 WBC    NRBC (Absolute) 0.00 K/uL    Neutrophils-Polys 78.00 (H) 44.00 - 72.00 %    Lymphocytes 12.30 (L) 22.00 - 41.00 %    Monocytes 0.90 0.00 - 13.40 %    Eosinophils 0.90 0.00 - 6.90 %    Basophils 0.00 0.00 - 1.80 %    Neutrophils (Absolute) 10.14 (H) 2.00 - 7.15 K/uL    Lymphs (Absolute) 1.45 1.00 - 4.80 K/uL    Monos (Absolute) 0.11 0.00 - 0.85 K/uL    Eos (Absolute) 0.11 0.00 - 0.51 K/uL    Baso (Absolute) 0.00 0.00 - 0.12 K/uL   COMP METABOLIC PANEL    Collection Time: 10/08/17  8:00 PM   Result Value Ref Range    Sodium 134 (L) 135 - 145 mmol/L    Potassium 3.3 (L) 3.6 - 5.5 mmol/L    Chloride 104 96 - 112 mmol/L    Co2 19 (L) 20 - 33 mmol/L    Anion Gap 11.0 0.0 - 11.9    Glucose 107 (H) 65 - 99 mg/dL    Bun 10 8 - 22 mg/dL    Creatinine 0.81 0.50 - 1.40 mg/dL    Calcium 9.5 8.5 - 10.5 mg/dL    AST(SGOT) 11 (L) 12 - 45 U/L    ALT(SGPT) 9 2 - 50 U/L    Alkaline Phosphatase 93 30 - 99 U/L    Total Bilirubin 1.2 0.1 - 1.5 mg/dL    Albumin 3.5 3.2 - 4.9 g/dL    Total Protein 7.4 6.0 - 8.2 g/dL    Globulin 3.9 (H) 1.9 - 3.5 g/dL    A-G Ratio 0.9 g/dL   ESTIMATED GFR    Collection Time: 10/08/17  8:00 PM   Result Value Ref Range    GFR If African American >60 >60 mL/min/1.73 m 2    GFR If Non African American >60 >60 mL/min/1.73 m 2   DIFFERENTIAL MANUAL    Collection Time: 10/08/17  8:00 PM   Result Value Ref Range    Bands-Stabs 7.90 0.00 - 10.00 %    Manual Diff Status PERFORMED    PERIPHERAL SMEAR REVIEW    Collection Time: 10/08/17  8:00 PM   Result Value Ref Range    Peripheral Smear Review see below    PLATELET ESTIMATE    Collection Time: 10/08/17  8:00 PM   Result Value Ref Range    Plt  Estimation Normal    MORPHOLOGY    Collection Time: 10/08/17  8:00 PM   Result Value Ref Range    RBC Morphology Normal    BLOOD CULTURE    Collection Time: 10/08/17  8:05 PM   Result Value Ref Range    Significant Indicator POS (POS)     Source BLD     Site PERIPHERAL     Blood Culture (A)      Growth detected by Bactec instrument.  10/09/2017  09:27  Gram Stain: Gram positive cocci: Possible Streptococcus sp.     HCG QUAL SERUM    Collection Time: 10/08/17  8:17 PM   Result Value Ref Range    Beta-Hcg Qualitative Serum Negative Negative   Lactic acid (lactate)    Collection Time: 10/08/17  9:55 PM   Result Value Ref Range    Lactic Acid 1.0 0.5 - 2.0 mmol/L   BLOOD CULTURE    Collection Time: 10/08/17  9:55 PM   Result Value Ref Range    Significant Indicator NEG     Source BLD     Site PERIPHERAL     Blood Culture       No Growth    Note: Blood cultures are incubated for 5 days and  are monitored continuously.Positive blood cultures  are called to the RN and reported as soon as  they are identified.     URINALYSIS    Collection Time: 10/08/17 11:43 PM   Result Value Ref Range    Color Yellow     Character Clear     Specific Gravity 1.014 <1.035    Ph 5.5 5.0 - 8.0    Glucose Negative Negative mg/dL    Ketones Negative Negative mg/dL    Protein Negative Negative mg/dL    Bilirubin Negative Negative    Urobilinogen, Urine 0.2 Negative    Nitrite Negative Negative    Leukocyte Esterase Small (A) Negative    Occult Blood Small (A) Negative    Micro Urine Req Microscopic    URINE MICROSCOPIC (W/UA)    Collection Time: 10/08/17 11:43 PM   Result Value Ref Range    WBC 10-20 (A) /hpf    RBC 2-5 (A) /hpf    Bacteria Negative None /hpf    Epithelial Cells Few /hpf    Hyaline Cast 0-2 /lpf   LACTIC ACID    Collection Time: 10/09/17 12:16 AM   Result Value Ref Range    Lactic Acid 0.9 0.5 - 2.0 mmol/L   INFLUENZA RAPID    Collection Time: 10/09/17 12:47 AM   Result Value Ref Range    Significant Indicator NEG     Source  RESP     Site RESPIRATORY     Rapid Influenza A-B       Negative for Influenza A and Influenza B antigens.  Infection due to influenza A or B cannot be ruled out  since the antigen present in the specimen may be below the  detection limit of the test. Culture confirmation of  negative samples is recommended.          Assessment/Plan   Murray Chavez s/p second stage  section five weeks ago now with abdominal pain and findings on pelvic US concerning for left tubal-ovarian abscess.  Will start Pip-Tazo for treatment of TOA.  If no clinical improvement in 24-48 hours, will consider diagnostic laparoscopy vs. Exploratory laparotomy with possible removal of mass/hysterecomy.     Explained it is OK for her to breast feed on the Pip-Tazo, or she may pump and dump to avoid antibiotic exposure to infant.     Gram positive cocci on blood culture, possible strep.  Appreciate ID consult regarding antibiotic coverage.

## 2017-10-09 NOTE — H&P
Chief Complaint   Patient presents with   • Abdominal Pain     Pt reports symptoms started Thursday after long car ride. States pain starts in mid lower abd and radiates up. Pt had emergent  on .   • Fever     Since Thursday   • Chills     LATE ENTRY: PT SEEN 730    History of present illness:   31 y.o. S/p primary LTCS 2017 presents to the ED with complaints of abdominal pain and fever started 3 days  She describes her abdominal pain as colicky, on and off starts at the lower abdomen radiating to the epigastrium  She had fever and chills at that time too  Denies cough no colds  No foul smelling vaginal discharge; no breast pain  Denies incision pain  No urinary symptoms, no constipation    Review of systems:  Pertinent positives documented in HPI and all other systems reviewed & are negative    All PMH, PSH, allergies, social history and FH reviewed and updated today:  Past Medical History:   Diagnosis Date   • HPV in female     DX    • Migraine    • Urinary tract infection, site not specified     bladder infections       Past Surgical History:   Procedure Laterality Date   • PRIMARY C SECTION  2017    Procedure: PRIMARY C SECTION;  Surgeon: Barbra Ulloa M.D.;  Location: LABOR AND DELIVERY;  Service: Obstetrics       Allergies: No Known Allergies    Social History     Social History   • Marital status:      Spouse name: N/A   • Number of children: N/A   • Years of education: N/A     Occupational History   • Not on file.     Social History Main Topics   • Smoking status: Former Smoker     Types: Cigarettes     Quit date: 2009   • Smokeless tobacco: Never Used   • Alcohol use No   • Drug use: No   • Sexual activity: Yes     Partners: Male     Other Topics Concern   • Not on file     Social History Narrative   • No narrative on file       No family history on file.    Physical exam:  Blood pressure (!) 95/58, pulse 95, temperature 36.8 °C (98.2 °F), resp. rate 16, height  "1.575 m (5' 2\"), weight 97 kg (213 lb 13.5 oz), last menstrual period 12/09/2016, SpO2 95 %, currently breastfeeding.    General:appears stated age, is in no apparent distress, is well developed and well nourished  Head: normocephalic, non-tender  Neck: neck is supple  CV : regular rate and rhythm, no peripheral edema  Lungs: Normal respiratory effort. Clear to auscultation bilaterally  Abdomen: Bowel sounds positive, nondistended, soft, generalized tenderness  Pelvic: not done  Skin: No rashes, or ulcers or lesions seen  Psychiatric: Patient shows appropriate affect, is alert and oriented x3, intact judgment and insight.    ASSESSMENT AND PLAN:   1. FEVER/SEPSIS   2. ABDOMINAL PAIN  - labs and CT scan reviewed   - continue ceftriaxone and flagyl IV  - obtain pelvic/transvaginal US  - monitor for fever. Call ext [phone]  - suggest pt to be moved to a private room/PP unit     "

## 2017-10-09 NOTE — CONSULTS
ADULT INFECTIOUS DISEASE CONSULT     Date of Service:10/9/17    Consult Requested By: Stephanie Gary M.D.    Reason for Consultation: Sepsis    History of Present Illness:   Murray Chavez is a 31 y.o. female who underwent a  on 2017. She had a normal postpartum course and was discharged on 2017. On the postop visits on 2017 and 17 her wound was clean. She presented to the emergency room on 10/8/2017 with abdominal pain fevers and chills. She apparently had been having abdominal pain for 2 days which occurred after a long car ride. In the ER she had a fever up to 102.4. She had a WBC count of 12,000 on admission. CT scan of the abdomen and pelvis shows no abscess.The ultrasound shows 7x3 cm heterogeneous lesion in her left ovary .Her blood cultures have come back positive for strep species. In view of that infectious disease consult has been called.    Review Of Systems:  Gen.-Complains of fevers. Complains of chills  HEENT- denies any sore throat, headache or vision changes  Pulmonary- denies any cough, shortness of breath  Cardiovascular- denies any chest pain, leg swelling.  GI- complains of nausea and vomiting 3 days ago. Complains of diffuse abdominal pain. More so inside her incision.  Musculoskeletal- denies any joint pains or swelling  Neuro- denies any weakness or sensory change  Psych- denies any depression or suicidal ideation  Genitourinary- denies any frequency or dysuria        PMH:   Past Medical History:   Diagnosis Date   • HPV in female     DX    • Migraine    • Urinary tract infection, site not specified     bladder infections         PSH:  Past Surgical History:   Procedure Laterality Date   • PRIMARY C SECTION  2017    Procedure: PRIMARY C SECTION;  Surgeon: Barbra Ulloa M.D.;  Location: LABOR AND DELIVERY;  Service: Obstetrics       FAMILY HX:  No family history on file.    SOCIAL HX:  Social History     Social History   • Marital status:  "     Spouse name: N/A   • Number of children: N/A   • Years of education: N/A     Occupational History   • Not on file.     Social History Main Topics   • Smoking status: Former Smoker     Types: Cigarettes     Quit date: 2009   • Smokeless tobacco: Never Used   • Alcohol use No   • Drug use: No   • Sexual activity: Yes     Partners: Male     Other Topics Concern   • Not on file     Social History Narrative   • No narrative on file     History   Smoking Status   • Former Smoker   • Types: Cigarettes   • Quit date: 2009   Smokeless Tobacco   • Never Used     History   Alcohol Use No       Allergies/Intolerances:  No Known Allergies    History reviewed with the patient    Other Current Medications:    Current Facility-Administered Medications:   •  ondansetron (ZOFRAN) syringe/vial injection 4 mg, 4 mg, Intravenous, Q6HRS PRN, Leodan Kuo M.D.  •  D5 NS infusion, , Intravenous, Continuous, Barbra Ulloa M.D., Last Rate: 125 mL/hr at 10/09/17 1151  •  piperacillin-tazobactam (ZOSYN) IVPB premix 3.375 g, 3.375 g, Intravenous, Q6HRS, Barbra Ulloa M.D., Stopped at 10/09/17 1345  •  hydrocodone-acetaminophen (NORCO) 5-325 MG per tablet 1-2 Tab, 1-2 Tab, Oral, Q6HRS PRN, Barbra Ulloa M.D., 1 Tab at 10/09/17 1315  •  HYDROmorphone (DILAUDID) injection 1 mg, 1 mg, Intravenous, Q3HRS PRN, Barbra Ulloa M.D.  [unfilled]    Most Recent Vital Signs:  BP (!) 96/62 Comment: nurse notified  Pulse (!) 118 Comment: nurse aware  Temp 37.2 °C (99 °F)   Resp 18   Ht 1.575 m (5' 2\")   Wt 97 kg (213 lb 13.5 oz)   LMP 2016   SpO2 91%   Breastfeeding? Yes   BMI 39.11 kg/m²   Temp  Av.6 °C (99.7 °F)  Min: 36.8 °C (98.2 °F)  Max: 39.1 °C (102.4 °F)    Physical Exam:  General: Nontoxic, no acute distress  HEENT: sclera anicteric, PERRL, EOMI, MMM, no oral lesions  Neck: supple, no lymphadenopathy  Chest: CTAB, no r/r/w, normal work of breathing.  Cardiac: Regular, no murmurs no gallops " "heard  Abdomen: Diffuse tenderness and guarding. The  incision is clean  Extremities: No edema. No joint swelling.  Skin: no rashes or erythema  Neuro: Alert and oriented times 3, non-focal exam    Pertinent Lab Results:  Recent Labs      10/08/17   2000   WBC  11.8*      Recent Labs      10/08/17   2000   HEMOGLOBIN  12.4   HEMATOCRIT  35.3*   MCV  89.1   MCH  31.3   PLATELETCT  186         Recent Labs      10/08/17   2000   SODIUM  134*   POTASSIUM  3.3*   CHLORIDE  104   CO2  19*   CREATININE  0.81        Recent Labs      10/08/17   2000   ALBUMIN  3.5        Pertinent Micro:  Results     Procedure Component Value Units Date/Time    BLOOD CULTURE [[id number]]  (Abnormal) Collected:  10/08/17 2005    Order Status:  Completed Specimen:  Blood from Peripheral Updated:  10/09/17 0928     Significant Indicator POS (POS)     Source BLD     Site PERIPHERAL     Blood Culture -- (A)     Growth detected by Bactec instrument.  10/09/2017  09:27  Gram Stain: Gram positive cocci: Possible Streptococcus sp.      Narrative:       Per Hospital Policy: Only change Specimen Src: to \"Line\" if  specified by physician order.    BLOOD CULTURE [[id number]] Collected:  10/08/17 2155    Order Status:  Completed Specimen:  Blood from Peripheral Updated:  10/09/17 0710     Significant Indicator NEG     Source BLD     Site PERIPHERAL     Blood Culture --     No Growth    Note: Blood cultures are incubated for 5 days and  are monitored continuously.Positive blood cultures  are called to the RN and reported as soon as  they are identified.      Narrative:       Per Hospital Policy: Only change Specimen Src: to \"Line\" if  specified by physician order.    INFLUENZA RAPID [[id number]] Collected:  10/09/17 0047    Order Status:  Completed Specimen:  Respirate from Respiratory Updated:  10/09/17 0136     Significant Indicator NEG     Source RESP     Site RESPIRATORY     Rapid Influenza A-B --     Negative for Influenza A and Influenza B " antigens.  Infection due to influenza A or B cannot be ruled out  since the antigen present in the specimen may be below the  detection limit of the test. Culture confirmation of  negative samples is recommended.      URINALYSIS [[id number]]  (Abnormal) Collected:  10/08/17 2343    Order Status:  Completed Specimen:  Urine Updated:  10/09/17 0011     Color Yellow     Character Clear     Specific Gravity 1.014     Ph 5.5     Glucose Negative mg/dL      Ketones Negative mg/dL      Protein Negative mg/dL      Bilirubin Negative     Urobilinogen, Urine 0.2     Nitrite Negative     Leukocyte Esterase Small (A)     Occult Blood Small (A)     Micro Urine Req Microscopic    Narrative:       Indication for culture:->Emergency Room Patient    URINE CULTURE(NEW) [[id number]] Collected:  10/08/17 2343    Order Status:  Completed Specimen:  Urine Updated:  10/08/17 2356    Narrative:       Indication for culture:->Emergency Room Patient    URINALYSIS [[id number]]     Order Status:  Canceled Specimen:  Urine     URINE CULTURE(NEW) [[id number]]     Order Status:  Sent Specimen:  Urine     BLOOD CULTURE [[id number]]     Order Status:  Sent Specimen:  Blood from Peripheral     BLOOD CULTURE [[id number]]     Order Status:  Sent Specimen:  Blood from Peripheral         Blood Culture   Date Value Ref Range Status   10/08/2017   Preliminary    No Growth    Note: Blood cultures are incubated for 5 days and  are monitored continuously.Positive blood cultures  are called to the RN and reported as soon as  they are identified.          Studies:  Ct-abdomen-pelvis With    Result Date: 10/8/2017  10/8/2017 9:57 PM HISTORY/REASON FOR EXAM:  Lower abdominal pain, fever, chills.  section  TECHNIQUE/EXAM DESCRIPTION:  CT scan of the abdomen and pelvis with contrast. Contrast-enhanced helical scanning was obtained from the diaphragmatic domes through the pubic symphysis following the bolus administration of nonionic contrast without  complication. 100 mL of Omnipaque 350 nonionic contrast was administered without complication. Low dose optimization technique was utilized for this CT exam including automated exposure control and adjustment of the mA and/or kV according to patient size. COMPARISON: Pelvic ultrasound 2017 FINDINGS: Lung bases: The lung bases are clear. Osseous structures:  Within normal limits. Abdomen: Uterus is enlarged consistent with being recently postpartum. There is fluid anterior and to the left of the uterus. No loculated collection is identified. Density is approximately 53 Hounsfield unit consistent with hemorrhage. The ovaries are not effectively identified with CT. The liver shows decrease in density that is consistent with fatty infiltration. There is no biliary dilation. The spleen is normal in appearance. The pancreas is normal in appearance. The splenic vein and portal vein enhance normally. Both adrenal glands are normal in appearance. The right kidney is normal in appearance. The left kidney is normal in appearance. There is no hydronephrosis. Bowel and mesentery: Within normal limits. The aorta is normal in appearance. The terminal ileum is normal in appearance. The appendix is identified.     1.  Small amount of hemorrhage anterior/left of the uterus 2.  No abscess 3.  Fatty infiltration of the liver    Dx-chest-portable (1 View)    Result Date: 10/8/2017  10/8/2017 9:20 PM HISTORY/REASON FOR EXAM:  Weakness and fever. TECHNIQUE/EXAM DESCRIPTION AND NUMBER OF VIEWS: Single portable view of the chest. COMPARISON: None FINDINGS: The lungs are clear. Cardiac silhouette: normal in size. Pleura: There are no pleural effusion or pneumothoraces. Osseous structures: No significant bony abnormality is present.     Negative single view of the chest.    Us-gyn-pelvis Transvaginal    Result Date: 10/9/2017  10/9/2017 9:21 AM HISTORY/REASON FOR EXAM:  Abdominal pain. Postpartum x5 weeks following .  TECHNIQUE/EXAM DESCRIPTION: Transvaginal pelvic ultrasound. COMPARISON:   Correlation is made with CT abdomen and pelvis dated 10/8/2017. FINDINGS: Both transabdominal and transvaginal scanning was performed to optimally visualize the pelvis. The uterus measures 5.06 cm x 10.50 cm x 5.77 cm. The uterine myometrium is heterogeneous. The endometrial echo complex measures 0.36 cm. There is trace fluid in the endometrial canal.. Low resistive waveforms are confirmed within the ovaries. The right ovary measures 2.89 cm x 3.50 cm x 2.25 cm. The left adnexal region measures 5.04 cm x 5.44 cm x 7.91 cm. There is a heterogeneous 6.8 x 2.6 cm region in or adjacent to the left ovary with internal flow. There is trace free fluid adjacent to the left adnexal structures. There is trace free fluid in the pelvic cul-de-sac.     1.  Heterogeneous region measuring 6.8 x 2.6 cm in or adjacent to the left ovary with internal flow. This is of uncertain etiology and significance. Given internal flow, finding does not definitely appear consistent with hemorrhage. 2.  Trace fluid in the endometrial canal. 3.  There is minimal trace fluid in the left adnexal region.  IMPRESSION:   Strep sepsis  Possible tubo-ovarian abscess      PLAN:   Murray Chavez is a 31 y.o. Female with strep sepsis and possible tubo-ovarian abscess after recent   Continue vancomycin and Zosyn  Probably needs repeat imaging soon  Patient is breast-feeding- breasts are nontender and soft.  I have reviewed all the records      Discussed with IM. Will continue to follow    Esther Patterson M.D.

## 2017-10-09 NOTE — PROGRESS NOTES
"Pharmacy Kinetics 31 y.o. female on vancomycin day # 1 10/9/2017    Indication for Treatment: streptococcal bacteremia pending identification and sensitivities    Pertinent history per medical record: Admitted on 10/8/2017 for abdominal pain, fever, and chills, and sepsis after  section on .  There is concern for left tubal-ovarian abscess.  ID is following    Other antibiotics: piperacillin-tazobactam    Allergies: Review of patient's allergies indicates no known allergies.     List concerns for renal function: concomitant pip/tazo, BMI 39    Pertinent cultures to date:   10/8/17 blood - peripheral x 2: 1 set with possible streptococcal sp      Recent Labs      10/08/17   2000   WBC  11.8*   NEUTSPOLYS  78.00*   BANDSSTABS  7.90     Recent Labs      10/08/17   2000   BUN  10   CREATININE  0.81   ALBUMIN  3.5     Intake/Output Summary (Last 24 hours) at 10/09/17 1619  Last data filed at 10/09/17 1210   Gross per 24 hour   Intake                0 ml   Output              400 ml   Net             -400 ml      Blood pressure (!) 96/62, pulse (!) 118, temperature 37.2 °C (99 °F), resp. rate 18, height 1.575 m (5' 2\"), weight 97 kg (213 lb 13.5 oz), last menstrual period 2016, SpO2 91 %, currently breastfeeding. Temp (24hrs), Av.6 °C (99.7 °F), Min:36.8 °C (98.2 °F), Max:39.1 °C (102.4 °F)      A/P   1. Vancomycin dose change: give 2400 mg IV x 1 then 2 gm IV q12h  2. Next vancomycin level: prior to the 3rd total dose  3. Goal trough: 12-16 mcg/mL  4. Comments: Vancomycin has been added for possible enterococcal bacteremia.  Renal function appears stable.    Mindi Covarrubias, PharmD, BCPS-ID      "

## 2017-10-09 NOTE — ED NOTES
Pt placed on bedpan, urine sample obtained and sent to lab, pt appears in less distress at this time.

## 2017-10-09 NOTE — PROGRESS NOTES
Report received, poc discussed, assumed care of pt.   Call light in reach, hourly rounding in place.   Pt gets up SBA.   NPO diet.  - void. LBM pta.   Morphine for pain.  No further needs.

## 2017-10-09 NOTE — ED PROVIDER NOTES
ED Provider Note    Scribed for Leodan Kuo M.D. by Leodan Kuo. 10/8/2017,  9:34 PM.    CHIEF COMPLAINT  Chief Complaint   Patient presents with   • Abdominal Pain     Pt reports symptoms started Thursday after long car ride. States pain starts in mid lower abd and radiates up. Pt had emergent  on .   • Fever     Since Thursday   • Chills       HPI  Murray Chavez is a 31 y.o. female who presents to the Emergency DepartmentFor low midline abdominal pain since Thursday morning. She arrives very uncomfortable appearing, with a triage heart rate of 145, and a fever of 102.4°F. She reports that she started noticing symptoms on Thursday after a long car ride. She also reports that she had an emergent  on . The fever and chills, she says, have also been present since Thursday.    REVIEW OF SYSTEMS  See HPI for further details. All other systems are negative.     PAST MEDICAL HISTORY   has a past medical history of HPV in female; Migraine; and Urinary tract infection, site not specified.    SOCIAL HISTORY  Social History     Social History Main Topics   • Smoking status: Former Smoker     Types: Cigarettes     Quit date: 2009   • Smokeless tobacco: Never Used   • Alcohol use No   • Drug use: No   • Sexual activity: Yes     Partners: Male     History   Drug Use No       SURGICAL HISTORY   has a past surgical history that includes primary c section (2017).    CURRENT MEDICATIONS  Home Medications    **Home medications have not yet been reviewed for this encounter**         ALLERGIES  No Known Allergies    PHYSICAL EXAM  VITAL SIGNS: /73   Pulse (!) 105   Temp (!) 39.1 °C (102.4 °F) (Oral)   Resp 20   Wt 97 kg (213 lb 13.5 oz)   SpO2 95%   BMI 39.11 kg/m²   Pulse ox interpretation: I interpret this pulse ox as normal.  Constitutional: Alert in no apparent distress.  HENT: No signs of trauma, Bilateral external ears normal, Nose normal.   Eyes:  Conjunctiva normal, Non-icteric.   Neck: Normal range of motion, Supple, No stridor.   Lymphatic: No lymphadenopathy noted.   Cardiovascular: Regular rate and rhythm, no murmurs.   Thorax & Lungs: Normal breath sounds, No respiratory distress, No wheezing, No chest tenderness.   Abdomen: Bowel sounds normal, Soft, moderate diffuse tenderness, No masses, No pulsatile masses. No peritoneal signs.  Skin: Warm, Dry, No erythema, No rash.   Extremities: Intact distal pulses, No edema, No cyanosis.  Musculoskeletal: Good range of motion in all major joints. No or major deformities noted.   Neurologic: Alert , Normal motor function, Normal sensory function, No focal deficits noted.   Psychiatric: Affect normal, Judgment normal, Mood normal.     DIAGNOSTIC STUDIES / PROCEDURES      LABS  Labs Reviewed   CBC WITH DIFFERENTIAL - Abnormal; Notable for the following:        Result Value    WBC 11.8 (*)     RBC 3.96 (*)     Hematocrit 35.3 (*)     MCHC 35.1 (*)     Neutrophils-Polys 78.00 (*)     Lymphocytes 12.30 (*)     Neutrophils (Absolute) 10.14 (*)     All other components within normal limits   COMP METABOLIC PANEL - Abnormal; Notable for the following:     Sodium 134 (*)     Potassium 3.3 (*)     Co2 19 (*)     Glucose 107 (*)     AST(SGOT) 11 (*)     Globulin 3.9 (*)     All other components within normal limits   URINALYSIS - Abnormal; Notable for the following:     Leukocyte Esterase Small (*)     Occult Blood Small (*)     All other components within normal limits    Narrative:     Indication for culture:->Emergency Room Patient   URINE MICROSCOPIC (W/UA) - Abnormal; Notable for the following:     WBC 10-20 (*)     RBC 2-5 (*)     All other components within normal limits    Narrative:     Indication for culture:->Emergency Room Patient   LACTIC ACID   LACTIC ACID   URINE CULTURE(NEW)    Narrative:     Indication for culture:->Emergency Room Patient   BLOOD CULTURE    Narrative:     Per Hospital Policy: Only change  "Specimen Src: to \"Line\" if  specified by physician order.   BLOOD CULTURE    Narrative:     Per Hospital Policy: Only change Specimen Src: to \"Line\" if  specified by physician order.   ESTIMATED GFR   DIFFERENTIAL MANUAL   PERIPHERAL SMEAR REVIEW   PLATELET ESTIMATE   MORPHOLOGY   LACTIC ACID   HCG QUAL SERUM   INFLUENZA RAPID   CBC WITH DIFFERENTIAL   COMP METABOLIC PANEL   URINE CULTURE(NEW)   BLOOD CULTURE   BLOOD CULTURE     All labs reviewed by me.    RADIOLOGY  CT-ABDOMEN-PELVIS WITH   Final Result      1.  Small amount of hemorrhage anterior/left of the uterus      2.  No abscess      3.  Fatty infiltration of the liver      DX-CHEST-PORTABLE (1 VIEW)   Final Result      Negative single view of the chest.        The radiologist's interpretation of all radiological studies have been reviewed by me.    COURSE & MEDICAL DECISION MAKING  Nursing notes, VS, PMSFHx reviewed in chart.     9:34 PM Patient seen and examined at bedside. Differential diagnosis includes but is not limited toPostoperative infection, postoperative bleeding, influenza, urosepsis, pneumonia. Ordered for laboratory tests, CT of the abdomen and pelvis, chest x-ray to evaluate. Patient will be treated with sepsis protocol fluids and antibiotics for possible intra-abdominal infection for her symptoms.     9:47 PM given the high likelihood of sepsis with the patient's recent abdominal surgery, I have called CT to prioritize her scan, and they will come to get the patient now.    11:27 PM this patient's CT scan shows a small amount of hemorrhage anterior and left of the uterus, but this is more consistent with hemorrhage then abscess, it is consistent with the patient's area of pain, and is difficult to them nor in the setting of her obvious fever, and tachycardia. However, the patient has not yet provided a urine sample. About 20 minutes ago, requested that the bedside nurse obtain a catheterized urine sample.    1:27 AM this patient is much more " comfortable, still has a low-grade tachycardia, but in general is doing better. I have paged and spoken with Dr. Ana Gary, on-call for Dr. Ulloa, who will be on call later today. Dr. Gary would like this patient admitted to her service for observation, continued antibiotics and fluids, and consultation with her surgeon in the morning. I have placed admission and bridge orders as a courtesy. After the bedside explaining the treatment plan the patient, who agrees to the admission.    DISPOSITION:  Patient will be admitted to Dr. Ziggy Gary  in guarded condition.      FINAL IMPRESSION  1. Sepsis  2. Abdominal pain

## 2017-10-09 NOTE — CONSULTS
Evaluated breast pump use to include frequency, duration, suction and speed settings. Keith intends to continue breast feeding her one month old infant and is using the pump to help stimulate and maintain milk production during her hospitalization. She may be facing an ovarian cyst surgery in the next few days, but for now antibiotic therapy is the plan. Encouraged her to contact us if she has any further need for Lactation support.

## 2017-10-10 LAB
BASOPHILS # BLD AUTO: 0.3 % (ref 0–1.8)
BASOPHILS # BLD: 0.04 K/UL (ref 0–0.12)
EOSINOPHIL # BLD AUTO: 0.37 K/UL (ref 0–0.51)
EOSINOPHIL NFR BLD: 2.9 % (ref 0–6.9)
ERYTHROCYTE [DISTWIDTH] IN BLOOD BY AUTOMATED COUNT: 45.1 FL (ref 35.9–50)
HCT VFR BLD AUTO: 30.7 % (ref 37–47)
HGB BLD-MCNC: 10.3 G/DL (ref 12–16)
IMM GRANULOCYTES # BLD AUTO: 0.29 K/UL (ref 0–0.11)
IMM GRANULOCYTES NFR BLD AUTO: 2.3 % (ref 0–0.9)
LYMPHOCYTES # BLD AUTO: 1.9 K/UL (ref 1–4.8)
LYMPHOCYTES NFR BLD: 14.8 % (ref 22–41)
MCH RBC QN AUTO: 30.5 PG (ref 27–33)
MCHC RBC AUTO-ENTMCNC: 33.6 G/DL (ref 33.6–35)
MCV RBC AUTO: 90.8 FL (ref 81.4–97.8)
MONOCYTES # BLD AUTO: 1.08 K/UL (ref 0–0.85)
MONOCYTES NFR BLD AUTO: 8.4 % (ref 0–13.4)
NEUTROPHILS # BLD AUTO: 9.16 K/UL (ref 2–7.15)
NEUTROPHILS NFR BLD: 71.3 % (ref 44–72)
NRBC # BLD AUTO: 0 K/UL
NRBC BLD AUTO-RTO: 0 /100 WBC
PLATELET # BLD AUTO: 211 K/UL (ref 164–446)
PMV BLD AUTO: 9.9 FL (ref 9–12.9)
RBC # BLD AUTO: 3.38 M/UL (ref 4.2–5.4)
WBC # BLD AUTO: 12.8 K/UL (ref 4.8–10.8)

## 2017-10-10 PROCEDURE — 36415 COLL VENOUS BLD VENIPUNCTURE: CPT

## 2017-10-10 PROCEDURE — A9270 NON-COVERED ITEM OR SERVICE: HCPCS | Performed by: OBSTETRICS & GYNECOLOGY

## 2017-10-10 PROCEDURE — 700105 HCHG RX REV CODE 258: Performed by: PHARMACIST

## 2017-10-10 PROCEDURE — 85025 COMPLETE CBC W/AUTO DIFF WBC: CPT

## 2017-10-10 PROCEDURE — 83605 ASSAY OF LACTIC ACID: CPT

## 2017-10-10 PROCEDURE — 700111 HCHG RX REV CODE 636 W/ 250 OVERRIDE (IP): Performed by: INTERNAL MEDICINE

## 2017-10-10 PROCEDURE — 700112 HCHG RX REV CODE 229: Performed by: OBSTETRICS & GYNECOLOGY

## 2017-10-10 PROCEDURE — 770006 HCHG ROOM/CARE - MED/SURG/GYN SEMI*

## 2017-10-10 PROCEDURE — 700101 HCHG RX REV CODE 250: Performed by: INTERNAL MEDICINE

## 2017-10-10 PROCEDURE — 700102 HCHG RX REV CODE 250 W/ 637 OVERRIDE(OP): Performed by: OBSTETRICS & GYNECOLOGY

## 2017-10-10 PROCEDURE — 700111 HCHG RX REV CODE 636 W/ 250 OVERRIDE (IP): Performed by: PHARMACIST

## 2017-10-10 PROCEDURE — 700111 HCHG RX REV CODE 636 W/ 250 OVERRIDE (IP): Performed by: OBSTETRICS & GYNECOLOGY

## 2017-10-10 PROCEDURE — 80053 COMPREHEN METABOLIC PANEL: CPT

## 2017-10-10 PROCEDURE — 700105 HCHG RX REV CODE 258: Performed by: OBSTETRICS & GYNECOLOGY

## 2017-10-10 RX ORDER — SODIUM CHLORIDE 9 MG/ML
1000 INJECTION, SOLUTION INTRAVENOUS ONCE
Status: DISCONTINUED | OUTPATIENT
Start: 2017-10-11 | End: 2017-10-11

## 2017-10-10 RX ORDER — CLINDAMYCIN PHOSPHATE 600 MG/50ML
600 INJECTION, SOLUTION INTRAVENOUS EVERY 8 HOURS
Status: DISCONTINUED | OUTPATIENT
Start: 2017-10-10 | End: 2017-10-12

## 2017-10-10 RX ORDER — ACETAMINOPHEN 325 MG/1
975 TABLET ORAL EVERY 6 HOURS PRN
Status: DISCONTINUED | OUTPATIENT
Start: 2017-10-10 | End: 2017-10-16 | Stop reason: HOSPADM

## 2017-10-10 RX ADMIN — CEFTRIAXONE 2 G: 2 INJECTION, SOLUTION INTRAVENOUS at 15:15

## 2017-10-10 RX ADMIN — HYDROXYZINE HYDROCHLORIDE 50 MG: 25 TABLET ORAL at 17:46

## 2017-10-10 RX ADMIN — HYDROMORPHONE HYDROCHLORIDE 1 MG: 1 INJECTION, SOLUTION INTRAMUSCULAR; INTRAVENOUS; SUBCUTANEOUS at 18:26

## 2017-10-10 RX ADMIN — HYDROMORPHONE HYDROCHLORIDE 1 MG: 1 INJECTION, SOLUTION INTRAMUSCULAR; INTRAVENOUS; SUBCUTANEOUS at 12:14

## 2017-10-10 RX ADMIN — VANCOMYCIN HYDROCHLORIDE 2000 MG: 100 INJECTION, POWDER, LYOPHILIZED, FOR SOLUTION INTRAVENOUS at 06:27

## 2017-10-10 RX ADMIN — DEXTROSE AND SODIUM CHLORIDE: 5; 900 INJECTION, SOLUTION INTRAVENOUS at 12:14

## 2017-10-10 RX ADMIN — GABAPENTIN 600 MG: 300 CAPSULE ORAL at 09:29

## 2017-10-10 RX ADMIN — DEXTROSE AND SODIUM CHLORIDE: 5; 900 INJECTION, SOLUTION INTRAVENOUS at 20:33

## 2017-10-10 RX ADMIN — CLINDAMYCIN IN 5 PERCENT DEXTROSE 600 MG: 12 INJECTION, SOLUTION INTRAVENOUS at 21:32

## 2017-10-10 RX ADMIN — TAZOBACTAM SODIUM AND PIPERACILLIN SODIUM 3.38 G: 375; 3 INJECTION, SOLUTION INTRAVENOUS at 05:24

## 2017-10-10 RX ADMIN — HYDROMORPHONE HYDROCHLORIDE 1 MG: 1 INJECTION, SOLUTION INTRAMUSCULAR; INTRAVENOUS; SUBCUTANEOUS at 01:46

## 2017-10-10 RX ADMIN — DOCUSATE SODIUM 100 MG: 100 CAPSULE ORAL at 20:33

## 2017-10-10 RX ADMIN — HYDROXYZINE HYDROCHLORIDE 50 MG: 25 TABLET ORAL at 09:29

## 2017-10-10 RX ADMIN — HYDROMORPHONE HYDROCHLORIDE 1 MG: 1 INJECTION, SOLUTION INTRAMUSCULAR; INTRAVENOUS; SUBCUTANEOUS at 21:32

## 2017-10-10 RX ADMIN — HYDROCODONE BITARTRATE AND ACETAMINOPHEN 2 TABLET: 5; 325 TABLET ORAL at 05:24

## 2017-10-10 RX ADMIN — Medication 1 TABLET: at 09:47

## 2017-10-10 RX ADMIN — DEXTROSE AND SODIUM CHLORIDE: 5; 900 INJECTION, SOLUTION INTRAVENOUS at 00:48

## 2017-10-10 RX ADMIN — DOCUSATE SODIUM 100 MG: 100 CAPSULE ORAL at 09:29

## 2017-10-10 RX ADMIN — IBUPROFEN 800 MG: 800 TABLET, FILM COATED ORAL at 07:48

## 2017-10-10 RX ADMIN — CLINDAMYCIN IN 5 PERCENT DEXTROSE 600 MG: 12 INJECTION, SOLUTION INTRAVENOUS at 14:39

## 2017-10-10 RX ADMIN — TAZOBACTAM SODIUM AND PIPERACILLIN SODIUM 3.38 G: 375; 3 INJECTION, SOLUTION INTRAVENOUS at 00:40

## 2017-10-10 RX ADMIN — IBUPROFEN 800 MG: 800 TABLET, FILM COATED ORAL at 22:56

## 2017-10-10 RX ADMIN — GABAPENTIN 600 MG: 300 CAPSULE ORAL at 20:33

## 2017-10-10 RX ADMIN — HYDROMORPHONE HYDROCHLORIDE 1 MG: 1 INJECTION, SOLUTION INTRAMUSCULAR; INTRAVENOUS; SUBCUTANEOUS at 07:48

## 2017-10-10 RX ADMIN — HYDROCODONE BITARTRATE AND ACETAMINOPHEN 1 TABLET: 5; 325 TABLET ORAL at 17:46

## 2017-10-10 ASSESSMENT — PAIN SCALES - GENERAL
PAINLEVEL_OUTOF10: 2
PAINLEVEL_OUTOF10: 2
PAINLEVEL_OUTOF10: 3
PAINLEVEL_OUTOF10: 4
PAINLEVEL_OUTOF10: 3
PAINLEVEL_OUTOF10: 5
PAINLEVEL_OUTOF10: 5
PAINLEVEL_OUTOF10: 7
PAINLEVEL_OUTOF10: 5
PAINLEVEL_OUTOF10: 7
PAINLEVEL_OUTOF10: 3
PAINLEVEL_OUTOF10: 5
PAINLEVEL_OUTOF10: 5

## 2017-10-10 ASSESSMENT — ENCOUNTER SYMPTOMS
ABDOMINAL PAIN: 1
SENSORY CHANGE: 0
SHORTNESS OF BREATH: 0
MYALGIAS: 0
COUGH: 0
FEVER: 1
BLURRED VISION: 0
SPEECH CHANGE: 0
NAUSEA: 0
VOMITING: 0
HEADACHES: 0

## 2017-10-10 NOTE — PROGRESS NOTES
A&O x4.  Ambulating with no assistance, steady gait.  HR slightly tachy, other vitals stable.   ml/hr.  C/o pain - medicated per MAR.  Tolerating diet.  + void.  Last BM pta.   Call light and personal belongings within reach.  POC discussed and all questions answered.  No additional needs at this time.      Patient educated that she should discard breast milk due to medications being given.  Patient verbally demonstrates understanding.

## 2017-10-10 NOTE — PROGRESS NOTES
"Pharmacy Kinetics 31 y.o. female on vancomycin day # 2 10/10/2017    Currently on Vancomycin 2,000mg iv q12hr (0700, 1900)  > ID consulting     Indication for Treatment: streptococcal bacteremia pending sensitivities     Pertinent history per medical record: Admitted on 10/8/2017 for abdominal pain, fever, and chills, and sepsis after  section on .  There is concern for left tubal-ovarian abscess.  ID is following     Other antibiotics: piperacillin-tazobactam     Allergies: Review of patient's allergies indicates no known allergies.      List concerns for renal function: concomitant pip/tazo, BMI 39     Pertinent cultures to date:   10/8/17 blood - peripheral x 2: 1 set = Beta hemolytic group A strep     Recent Labs      10/08/17   2000   WBC  11.8*   NEUTSPOLYS  78.00*   BANDSSTABS  7.90     Recent Labs      10/08/17   2000   BUN  10   CREATININE  0.81   ALBUMIN  3.5     No results for input(s): VANCOTROUGH, VANCOPEAK, VANCORANDOM in the last 72 hours.  Intake/Output Summary (Last 24 hours) at 10/10/17 0850  Last data filed at 10/10/17 0400   Gross per 24 hour   Intake             4400 ml   Output              400 ml   Net             4000 ml      Blood pressure (!) 98/63, pulse 86, temperature 36.7 °C (98.1 °F), resp. rate 17, height 1.575 m (5' 2\"), weight 97 kg (213 lb 13.5 oz), last menstrual period 2016, SpO2 92 %, currently breastfeeding. Temp (24hrs), Av.8 °C (98.2 °F), Min:36.2 °C (97.2 °F), Max:37.4 °C (99.3 °F)      A/P   1. Vancomycin dose change: Not indicated - continue current regimen.   2. Next vancomycin level: Tomorrow, Oct 11th at 0630 AM if vancomycin continues   3. Goal trough: 12 - 16 mcg/mL   Comments: Vancomycin has been added for possible enterococcal bacteremia. Cultures have identified Beta hemolytic group A streptococcus. ID consulting. Renal function appears adequate for age. Moderate risk for accumulation d/t body habitus. First trough level tomorrow morning prior " to the 4th total dose of vancomycin.     Claire Cohen, PharmD

## 2017-10-10 NOTE — CARE PLAN
Problem: Psychosocial Needs:  Goal: Level of anxiety will decrease  Outcome: PROGRESSING AS EXPECTED  Intervention: Identify and develop with patient strategies to cope with anxiety triggers  Pt is anxious. Family is present. One on one discussion provided for comfort. PRN anxiety meds available as needed per MAR. Escalated to charge RN and MD.    Problem: Pain Management  Goal: Pain level will decrease to patient’s comfort goal  Outcome: PROGRESSING AS EXPECTED  Pt has pain in abdomen/head, receiving PRN pain meds per MAR, repositioned for comfort. Non-pharmacologic options offered.

## 2017-10-10 NOTE — PROGRESS NOTES
"HD#1 - admission for TOA    S: Pt reports pain improving, now 3/10 intensity.  Continues with sweats and chills.  Tolerating regular diet.  No emesis.  + Flatus.  Ambulating with some pain.  Pumping and dumping milk.    O:Blood pressure (!) 91/61, pulse 87, temperature 36.4 °C (97.5 °F), resp. rate 18, height 1.575 m (5' 2\"), weight 97 kg (213 lb 13.5 oz), last menstrual period 2016, SpO2 90 %, currently breastfeeding.    GENERAL: Alert, in no apparent distress, nontoxic, sweating.  PSYCHIATRIC: Appropriate affect, intact insight and judgement.  RESPIRATORY: Normal respiratory effort.  Lungs clear to auscultation.   CARDIOVASCULAR: RRR, no murmur, gallop, or rub.  ABDOMEN: Soft, tender over fundus but improved from yestertday, nondistended.  No palpable masses.  No rebound or guarding. BS active  BACK: No CVA tenderness  EXTREMITIES: No edema, calves NT  SKIN: No rash    A/P:     1.  S/P  delivery 5 weeks ago, now admitted for TOA.  Afebrile since admission and clinically improving on Pip/Tazo and Vancomycin.  One blood culture with preliminary growth of gram + cocci consistent with strep.  Continue IV antibiotics.  Discussed CT and US findings with radiologist, who feels there is nothing that is amenable to a drain.  If continued fevers or significant pain after 48 hours of IV antibiotics, will consider laparoscopy/laparotomy.      2. Pt is pumping and dumping - no evidence of breast infection.  Explained that none of the meds are a contraindication to breast feeding, but given the multiple medications and narcotics, feel dumping is best for baby at this point.     3. Anxiety - I recommended that she restart her Zoloft for anxiety - she states she \" can deal with it at this point\".  Atarax prn for now.  If further significant anxiety attack, recommend psych consult.          "

## 2017-10-10 NOTE — PROGRESS NOTES
Infectious Disease Progress Note    Author: Esther Patterson M.D. Date & Time of service: 10/10/2017  1:32 PM    Chief Complaint:  Sepsis    Interval History:  Labs Reviewed, Medications Reviewed, Radiology Reviewed and Wound Reviewed.  31-year-old female with recent  admitted with sepsis and abdominal pain  10/10/17-MAXIMUM TEMPERATURE 99.3. Patient very anxious and crying. Complains of diffuse sweating  Review of Systems:  Review of Systems   Constitutional: Positive for fever.        Diffuse sweats   Eyes: Negative for blurred vision.   Respiratory: Negative for cough and shortness of breath.    Cardiovascular: Negative for chest pain and leg swelling.   Gastrointestinal: Positive for abdominal pain. Negative for nausea and vomiting.   Genitourinary: Negative for dysuria.   Musculoskeletal: Negative for myalgias.   Skin: Negative for rash.   Neurological: Negative for sensory change, speech change and headaches.       Hemodynamics:  Temp (24hrs), Av.7 °C (98 °F), Min:36.2 °C (97.2 °F), Max:37.4 °C (99.3 °F)  Temperature: 36.7 °C (98.1 °F)  Pulse  Av.7  Min: 86  Max: 145   Blood Pressure: (!) 98/63       Physical Exam:  Physical Exam   Constitutional: She is oriented to person, place, and time. No distress.   HENT:   Mouth/Throat: No oropharyngeal exudate.   Eyes: No scleral icterus.   Neck: Neck supple.   Cardiovascular: Regular rhythm.    No murmur heard.  Pulmonary/Chest: She has no wheezes. She has no rales.   Abdominal: Soft. There is tenderness. There is no rebound and no guarding.   Guarding and tenderness much improved  Incision is clean   Neurological: She is alert and oriented to person, place, and time.   Psychiatric: Her mood appears anxious.   Crying   Vitals reviewed.      Meds:    Current Facility-Administered Medications:   •  cefTRIAXone (ROCEPHIN) IVPB  •  ondansetron  •  D5 NS  •  hydrocodone-acetaminophen  •  hydromorphone  •  docusate sodium  •  ibuprofen  •  gabapentin  •   prenatal plus vitamin  •  hydrOXYzine    Labs:  Recent Labs      10/08/17   2000   WBC  11.8*   RBC  3.96*   HEMOGLOBIN  12.4   HEMATOCRIT  35.3*   MCV  89.1   MCH  31.3   RDW  42.7   PLATELETCT  186   MPV  10.0   NEUTSPOLYS  78.00*   LYMPHOCYTES  12.30*   MONOCYTES  0.90   EOSINOPHILS  0.90   BASOPHILS  0.00   RBCMORPHOLO  Normal     Recent Labs      10/08/17   2000   SODIUM  134*   POTASSIUM  3.3*   CHLORIDE  104   CO2  19*   GLUCOSE  107*   BUN  10     Recent Labs      10/08/17   2000   ALBUMIN  3.5   TBILIRUBIN  1.2   ALKPHOSPHAT  93   TOTPROTEIN  7.4   ALTSGPT  9   ASTSGOT  11*   CREATININE  0.81       Imaging:  Ct-abdomen-pelvis With    Result Date: 10/8/2017  10/8/2017 9:57 PM HISTORY/REASON FOR EXAM:  Lower abdominal pain, fever, chills.  section  TECHNIQUE/EXAM DESCRIPTION:  CT scan of the abdomen and pelvis with contrast. Contrast-enhanced helical scanning was obtained from the diaphragmatic domes through the pubic symphysis following the bolus administration of nonionic contrast without complication. 100 mL of Omnipaque 350 nonionic contrast was administered without complication. Low dose optimization technique was utilized for this CT exam including automated exposure control and adjustment of the mA and/or kV according to patient size. COMPARISON: Pelvic ultrasound 2017 FINDINGS: Lung bases: The lung bases are clear. Osseous structures:  Within normal limits. Abdomen: Uterus is enlarged consistent with being recently postpartum. There is fluid anterior and to the left of the uterus. No loculated collection is identified. Density is approximately 53 Hounsfield unit consistent with hemorrhage. The ovaries are not effectively identified with CT. The liver shows decrease in density that is consistent with fatty infiltration. There is no biliary dilation. The spleen is normal in appearance. The pancreas is normal in appearance. The splenic vein and portal vein enhance normally. Both  adrenal glands are normal in appearance. The right kidney is normal in appearance. The left kidney is normal in appearance. There is no hydronephrosis. Bowel and mesentery: Within normal limits. The aorta is normal in appearance. The terminal ileum is normal in appearance. The appendix is identified.     1.  Small amount of hemorrhage anterior/left of the uterus 2.  No abscess 3.  Fatty infiltration of the liver    Dx-chest-portable (1 View)    Result Date: 10/8/2017  10/8/2017 9:20 PM HISTORY/REASON FOR EXAM:  Weakness and fever. TECHNIQUE/EXAM DESCRIPTION AND NUMBER OF VIEWS: Single portable view of the chest. COMPARISON: None FINDINGS: The lungs are clear. Cardiac silhouette: normal in size. Pleura: There are no pleural effusion or pneumothoraces. Osseous structures: No significant bony abnormality is present.     Negative single view of the chest.    Us-gyn-pelvis Transvaginal    Result Date: 10/9/2017  10/9/2017 9:21 AM HISTORY/REASON FOR EXAM:  Abdominal pain. Postpartum x5 weeks following . TECHNIQUE/EXAM DESCRIPTION: Transvaginal pelvic ultrasound. COMPARISON:   Correlation is made with CT abdomen and pelvis dated 10/8/2017. FINDINGS: Both transabdominal and transvaginal scanning was performed to optimally visualize the pelvis. The uterus measures 5.06 cm x 10.50 cm x 5.77 cm. The uterine myometrium is heterogeneous. The endometrial echo complex measures 0.36 cm. There is trace fluid in the endometrial canal.. Low resistive waveforms are confirmed within the ovaries. The right ovary measures 2.89 cm x 3.50 cm x 2.25 cm. The left adnexal region measures 5.04 cm x 5.44 cm x 7.91 cm. There is a heterogeneous 6.8 x 2.6 cm region in or adjacent to the left ovary with internal flow. There is trace free fluid adjacent to the left adnexal structures. There is trace free fluid in the pelvic cul-de-sac.     1.  Heterogeneous region measuring 6.8 x 2.6 cm in or adjacent to the left ovary with internal flow.  "This is of uncertain etiology and significance. Given internal flow, finding does not definitely appear consistent with hemorrhage. 2.  Trace fluid in the endometrial canal. 3.  There is minimal trace fluid in the left adnexal region.      Micro:  Results     Procedure Component Value Units Date/Time    URINE CULTURE(NEW) [[id number]] Collected:  10/08/17 2343    Order Status:  Completed Specimen:  Urine Updated:  10/10/17 1159     Significant Indicator NEG     Source UR     Site --     Urine Culture Mixed skin chrissie 10-50,000 cfu/mL    Narrative:       Indication for culture:->Emergency Room Patient    BLOOD CULTURE [[id number]]  (Abnormal) Collected:  10/08/17 2005    Order Status:  Completed Specimen:  Blood from Peripheral Updated:  10/10/17 0805     Significant Indicator POS (POS)     Source BLD     Site PERIPHERAL     Blood Culture Growth detected by Bactec instrument.  10/09/2017  09:27 (A)     Blood Culture Beta Hemolytic Streptococcus group A (A)    Narrative:       CALL  Allen  141 tel. [phone],  CALLED  141 tel. [phone] 10/09/2017, 09:30, RB PERF. RESULTS CALLED  TO:Cassie [id number] Rn  Per Hospital Policy: Only change Specimen Src: to \"Line\" if  specified by physician order.    BLOOD CULTURE [[id number]] Collected:  10/08/17 2155    Order Status:  Completed Specimen:  Blood from Peripheral Updated:  10/09/17 0710     Significant Indicator NEG     Source BLD     Site PERIPHERAL     Blood Culture --     No Growth    Note: Blood cultures are incubated for 5 days and  are monitored continuously.Positive blood cultures  are called to the RN and reported as soon as  they are identified.      Narrative:       Per Hospital Policy: Only change Specimen Src: to \"Line\" if  specified by physician order.    INFLUENZA RAPID [[id number]] Collected:  10/09/17 0047    Order Status:  Completed Specimen:  Respirate from Respiratory Updated:  10/09/17 0136     Significant Indicator NEG     Source RESP     Site RESPIRATORY     " Rapid Influenza A-B --     Negative for Influenza A and Influenza B antigens.  Infection due to influenza A or B cannot be ruled out  since the antigen present in the specimen may be below the  detection limit of the test. Culture confirmation of  negative samples is recommended.      URINALYSIS [[id number]]  (Abnormal) Collected:  10/08/17 2343    Order Status:  Completed Specimen:  Urine Updated:  10/09/17 0011     Color Yellow     Character Clear     Specific Gravity 1.014     Ph 5.5     Glucose Negative mg/dL      Ketones Negative mg/dL      Protein Negative mg/dL      Bilirubin Negative     Urobilinogen, Urine 0.2     Nitrite Negative     Leukocyte Esterase Small (A)     Occult Blood Small (A)     Micro Urine Req Microscopic    Narrative:       Indication for culture:->Emergency Room Patient    URINALYSIS [[id number]]     Order Status:  Canceled Specimen:  Urine     BLOOD CULTURE [[id number]]     Order Status:  Sent Specimen:  Blood from Peripheral     BLOOD CULTURE [[id number]]     Order Status:  Sent Specimen:  Blood from Peripheral     URINE CULTURE(NEW) [[id number]] Collected:  10/08/17 0000    Order Status:  Canceled Specimen:  Urine           Assessment:  Active Hospital Problems    Diagnosis   • Sepsis (CMS-[facility]) [A41.9]   • Abdominal pain [R10.9]       Plan:  Group A strep sepsis  Blood cultures are positive from 10/8/17  Discontinue Zosyn and vancomycin  Start Clinda and Rocephin  Patient is already discarding her breast milk. Advised strictly not to breast-feed the baby while on clindamycin.    Possible endometritis  Patient is clinically slightly better  Because of the group A strep I spoke to GYN to keep a low threshold for laparoscopic surgery    Leukocytosis  Due to above  Repeat CBC  Discussed with OB/GYN

## 2017-10-11 ENCOUNTER — APPOINTMENT (OUTPATIENT)
Dept: RADIOLOGY | Facility: MEDICAL CENTER | Age: 32
DRG: 769 | End: 2017-10-11
Attending: OBSTETRICS & GYNECOLOGY
Payer: COMMERCIAL

## 2017-10-11 ENCOUNTER — TELEPHONE (OUTPATIENT)
Dept: OBGYN | Facility: CLINIC | Age: 32
End: 2017-10-11

## 2017-10-11 LAB
ALBUMIN SERPL BCP-MCNC: 3.1 G/DL (ref 3.2–4.9)
ALBUMIN/GLOB SERPL: 0.9 G/DL
ALP SERPL-CCNC: 92 U/L (ref 30–99)
ALT SERPL-CCNC: 8 U/L (ref 2–50)
ANION GAP SERPL CALC-SCNC: 10 MMOL/L (ref 0–11.9)
AST SERPL-CCNC: 12 U/L (ref 12–45)
BACTERIA BLD CULT: ABNORMAL
BACTERIA BLD CULT: ABNORMAL
BACTERIA UR CULT: ABNORMAL
BACTERIA UR CULT: ABNORMAL
BASOPHILS # BLD AUTO: 0.8 % (ref 0–1.8)
BASOPHILS # BLD: 0.1 K/UL (ref 0–0.12)
BILIRUB SERPL-MCNC: 0.5 MG/DL (ref 0.1–1.5)
BUN SERPL-MCNC: 6 MG/DL (ref 8–22)
CALCIUM SERPL-MCNC: 8.5 MG/DL (ref 8.5–10.5)
CHLORIDE SERPL-SCNC: 112 MMOL/L (ref 96–112)
CO2 SERPL-SCNC: 19 MMOL/L (ref 20–33)
CREAT SERPL-MCNC: 1.37 MG/DL (ref 0.5–1.4)
EOSINOPHIL # BLD AUTO: 0.22 K/UL (ref 0–0.51)
EOSINOPHIL NFR BLD: 1.9 % (ref 0–6.9)
ERYTHROCYTE [DISTWIDTH] IN BLOOD BY AUTOMATED COUNT: 47.5 FL (ref 35.9–50)
ETEST SENSITIVITY ETEST: NORMAL
GFR SERPL CREATININE-BSD FRML MDRD: 45 ML/MIN/1.73 M 2
GLOBULIN SER CALC-MCNC: 3.4 G/DL (ref 1.9–3.5)
GLUCOSE SERPL-MCNC: 90 MG/DL (ref 65–99)
GRAM STN SPEC: NORMAL
HCT VFR BLD AUTO: 32.1 % (ref 37–47)
HGB BLD-MCNC: 10.4 G/DL (ref 12–16)
IMM GRANULOCYTES # BLD AUTO: 0.29 K/UL (ref 0–0.11)
IMM GRANULOCYTES NFR BLD AUTO: 2.4 % (ref 0–0.9)
LACTATE BLD-SCNC: 1.2 MMOL/L (ref 0.5–2)
LYMPHOCYTES # BLD AUTO: 1.72 K/UL (ref 1–4.8)
LYMPHOCYTES NFR BLD: 14.5 % (ref 22–41)
MCH RBC QN AUTO: 29.9 PG (ref 27–33)
MCHC RBC AUTO-ENTMCNC: 32.4 G/DL (ref 33.6–35)
MCV RBC AUTO: 92.2 FL (ref 81.4–97.8)
MONOCYTES # BLD AUTO: 1.29 K/UL (ref 0–0.85)
MONOCYTES NFR BLD AUTO: 10.9 % (ref 0–13.4)
NEUTROPHILS # BLD AUTO: 8.23 K/UL (ref 2–7.15)
NEUTROPHILS NFR BLD: 69.5 % (ref 44–72)
NRBC # BLD AUTO: 0 K/UL
NRBC BLD AUTO-RTO: 0 /100 WBC
PLATELET # BLD AUTO: 231 K/UL (ref 164–446)
PMV BLD AUTO: 9.8 FL (ref 9–12.9)
POTASSIUM SERPL-SCNC: 3.4 MMOL/L (ref 3.6–5.5)
PROT SERPL-MCNC: 6.5 G/DL (ref 6–8.2)
RBC # BLD AUTO: 3.48 M/UL (ref 4.2–5.4)
SIGNIFICANT IND 70042: ABNORMAL
SIGNIFICANT IND 70042: ABNORMAL
SIGNIFICANT IND 70042: NORMAL
SITE SITE: ABNORMAL
SITE SITE: ABNORMAL
SITE SITE: NORMAL
SODIUM SERPL-SCNC: 141 MMOL/L (ref 135–145)
SOURCE SOURCE: ABNORMAL
SOURCE SOURCE: ABNORMAL
SOURCE SOURCE: NORMAL
WBC # BLD AUTO: 11.9 K/UL (ref 4.8–10.8)

## 2017-10-11 PROCEDURE — 700101 HCHG RX REV CODE 250

## 2017-10-11 PROCEDURE — 160029 HCHG SURGERY MINUTES - 1ST 30 MINS LEVEL 4: Performed by: OBSTETRICS & GYNECOLOGY

## 2017-10-11 PROCEDURE — 700104 HCHG RX REV CODE 254

## 2017-10-11 PROCEDURE — 87205 SMEAR GRAM STAIN: CPT

## 2017-10-11 PROCEDURE — 0UT10ZZ RESECTION OF LEFT OVARY, OPEN APPROACH: ICD-10-PCS | Performed by: OBSTETRICS & GYNECOLOGY

## 2017-10-11 PROCEDURE — 700105 HCHG RX REV CODE 258: Performed by: OBSTETRICS & GYNECOLOGY

## 2017-10-11 PROCEDURE — 700101 HCHG RX REV CODE 250: Performed by: INTERNAL MEDICINE

## 2017-10-11 PROCEDURE — 700111 HCHG RX REV CODE 636 W/ 250 OVERRIDE (IP): Performed by: OBSTETRICS & GYNECOLOGY

## 2017-10-11 PROCEDURE — A9270 NON-COVERED ITEM OR SERVICE: HCPCS | Performed by: OBSTETRICS & GYNECOLOGY

## 2017-10-11 PROCEDURE — 700102 HCHG RX REV CODE 250 W/ 637 OVERRIDE(OP): Performed by: OBSTETRICS & GYNECOLOGY

## 2017-10-11 PROCEDURE — 88307 TISSUE EXAM BY PATHOLOGIST: CPT

## 2017-10-11 PROCEDURE — 160009 HCHG ANES TIME/MIN: Performed by: OBSTETRICS & GYNECOLOGY

## 2017-10-11 PROCEDURE — A4314 CATH W/DRAINAGE 2-WAY LATEX: HCPCS | Performed by: OBSTETRICS & GYNECOLOGY

## 2017-10-11 PROCEDURE — 700111 HCHG RX REV CODE 636 W/ 250 OVERRIDE (IP): Performed by: INTERNAL MEDICINE

## 2017-10-11 PROCEDURE — 501445 HCHG STAPLER, SKIN DISP: Performed by: OBSTETRICS & GYNECOLOGY

## 2017-10-11 PROCEDURE — 36415 COLL VENOUS BLD VENIPUNCTURE: CPT

## 2017-10-11 PROCEDURE — 700117 HCHG RX CONTRAST REV CODE 255: Performed by: OBSTETRICS & GYNECOLOGY

## 2017-10-11 PROCEDURE — 87077 CULTURE AEROBIC IDENTIFY: CPT

## 2017-10-11 PROCEDURE — 74177 CT ABD & PELVIS W/CONTRAST: CPT

## 2017-10-11 PROCEDURE — 501838 HCHG SUTURE GENERAL: Performed by: OBSTETRICS & GYNECOLOGY

## 2017-10-11 PROCEDURE — 500698 HCHG HEMOCLIP, MEDIUM: Performed by: OBSTETRICS & GYNECOLOGY

## 2017-10-11 PROCEDURE — 87070 CULTURE OTHR SPECIMN AEROBIC: CPT

## 2017-10-11 PROCEDURE — 700111 HCHG RX REV CODE 636 W/ 250 OVERRIDE (IP)

## 2017-10-11 PROCEDURE — 160036 HCHG PACU - EA ADDL 30 MINS PHASE I: Performed by: OBSTETRICS & GYNECOLOGY

## 2017-10-11 PROCEDURE — 160041 HCHG SURGERY MINUTES - EA ADDL 1 MIN LEVEL 4: Performed by: OBSTETRICS & GYNECOLOGY

## 2017-10-11 PROCEDURE — 88305 TISSUE EXAM BY PATHOLOGIST: CPT

## 2017-10-11 PROCEDURE — 0UT70ZZ RESECTION OF BILATERAL FALLOPIAN TUBES, OPEN APPROACH: ICD-10-PCS | Performed by: OBSTETRICS & GYNECOLOGY

## 2017-10-11 PROCEDURE — 160035 HCHG PACU - 1ST 60 MINS PHASE I: Performed by: OBSTETRICS & GYNECOLOGY

## 2017-10-11 PROCEDURE — 87075 CULTR BACTERIA EXCEPT BLOOD: CPT

## 2017-10-11 PROCEDURE — 0TNB0ZZ RELEASE BLADDER, OPEN APPROACH: ICD-10-PCS | Performed by: OBSTETRICS & GYNECOLOGY

## 2017-10-11 PROCEDURE — 160002 HCHG RECOVERY MINUTES (STAT): Performed by: OBSTETRICS & GYNECOLOGY

## 2017-10-11 PROCEDURE — 700112 HCHG RX REV CODE 229: Performed by: OBSTETRICS & GYNECOLOGY

## 2017-10-11 PROCEDURE — 160048 HCHG OR STATISTICAL LEVEL 1-5: Performed by: OBSTETRICS & GYNECOLOGY

## 2017-10-11 PROCEDURE — 87040 BLOOD CULTURE FOR BACTERIA: CPT | Mod: 91

## 2017-10-11 PROCEDURE — 500697 HCHG HEMOCLIP, LARGE (ORANGE): Performed by: OBSTETRICS & GYNECOLOGY

## 2017-10-11 PROCEDURE — 500122 HCHG BOVIE, BLADE: Performed by: OBSTETRICS & GYNECOLOGY

## 2017-10-11 PROCEDURE — 0UT90ZZ RESECTION OF UTERUS, OPEN APPROACH: ICD-10-PCS | Performed by: OBSTETRICS & GYNECOLOGY

## 2017-10-11 PROCEDURE — 770006 HCHG ROOM/CARE - MED/SURG/GYN SEMI*

## 2017-10-11 PROCEDURE — 0DNW0ZZ RELEASE PERITONEUM, OPEN APPROACH: ICD-10-PCS | Performed by: OBSTETRICS & GYNECOLOGY

## 2017-10-11 RX ORDER — OXYCODONE HYDROCHLORIDE 10 MG/1
10 TABLET ORAL
Status: DISCONTINUED | OUTPATIENT
Start: 2017-10-11 | End: 2017-10-11

## 2017-10-11 RX ORDER — OXYCODONE HYDROCHLORIDE 10 MG/1
10 TABLET ORAL EVERY 4 HOURS PRN
Status: DISCONTINUED | OUTPATIENT
Start: 2017-10-11 | End: 2017-10-11

## 2017-10-11 RX ORDER — OXYCODONE HYDROCHLORIDE 5 MG/1
5 TABLET ORAL EVERY 4 HOURS PRN
Status: DISCONTINUED | OUTPATIENT
Start: 2017-10-11 | End: 2017-10-11

## 2017-10-11 RX ORDER — HYDROMORPHONE HYDROCHLORIDE 2 MG/ML
1 INJECTION, SOLUTION INTRAMUSCULAR; INTRAVENOUS; SUBCUTANEOUS
Status: DISCONTINUED | OUTPATIENT
Start: 2017-10-11 | End: 2017-10-11

## 2017-10-11 RX ORDER — OXYCODONE HYDROCHLORIDE 10 MG/1
20 TABLET ORAL
Status: DISCONTINUED | OUTPATIENT
Start: 2017-10-11 | End: 2017-10-11

## 2017-10-11 RX ORDER — ONDANSETRON 2 MG/ML
4 INJECTION INTRAMUSCULAR; INTRAVENOUS EVERY 6 HOURS PRN
Status: DISCONTINUED | OUTPATIENT
Start: 2017-10-11 | End: 2017-10-16 | Stop reason: HOSPADM

## 2017-10-11 RX ORDER — KETOROLAC TROMETHAMINE 30 MG/ML
30 INJECTION, SOLUTION INTRAMUSCULAR; INTRAVENOUS EVERY 6 HOURS
Status: COMPLETED | OUTPATIENT
Start: 2017-10-11 | End: 2017-10-14

## 2017-10-11 RX ORDER — SODIUM CHLORIDE 9 MG/ML
30 INJECTION, SOLUTION INTRAVENOUS ONCE
Status: COMPLETED | OUTPATIENT
Start: 2017-10-11 | End: 2017-10-11

## 2017-10-11 RX ORDER — OXYCODONE HYDROCHLORIDE 5 MG/1
5 TABLET ORAL EVERY 4 HOURS PRN
Status: DISCONTINUED | OUTPATIENT
Start: 2017-10-11 | End: 2017-10-16 | Stop reason: HOSPADM

## 2017-10-11 RX ORDER — SIMETHICONE 80 MG
80 TABLET,CHEWABLE ORAL EVERY 8 HOURS PRN
Status: DISCONTINUED | OUTPATIENT
Start: 2017-10-11 | End: 2017-10-16 | Stop reason: HOSPADM

## 2017-10-11 RX ORDER — OXYCODONE HYDROCHLORIDE 10 MG/1
10 TABLET ORAL EVERY 4 HOURS PRN
Status: DISCONTINUED | OUTPATIENT
Start: 2017-10-11 | End: 2017-10-16 | Stop reason: HOSPADM

## 2017-10-11 RX ORDER — OXYCODONE HYDROCHLORIDE 5 MG/1
5 TABLET ORAL
Status: DISCONTINUED | OUTPATIENT
Start: 2017-10-11 | End: 2017-10-11

## 2017-10-11 RX ADMIN — DOCUSATE SODIUM 100 MG: 100 CAPSULE ORAL at 20:36

## 2017-10-11 RX ADMIN — GABAPENTIN 600 MG: 300 CAPSULE ORAL at 08:06

## 2017-10-11 RX ADMIN — KETOROLAC TROMETHAMINE 30 MG: 30 INJECTION, SOLUTION INTRAMUSCULAR at 23:24

## 2017-10-11 RX ADMIN — Medication 1 TABLET: at 08:06

## 2017-10-11 RX ADMIN — OXYCODONE HYDROCHLORIDE 10 MG: 10 TABLET ORAL at 08:06

## 2017-10-11 RX ADMIN — CLINDAMYCIN IN 5 PERCENT DEXTROSE 600 MG: 12 INJECTION, SOLUTION INTRAVENOUS at 05:44

## 2017-10-11 RX ADMIN — OXYCODONE HYDROCHLORIDE 10 MG: 10 TABLET ORAL at 00:19

## 2017-10-11 RX ADMIN — GABAPENTIN 600 MG: 300 CAPSULE ORAL at 20:35

## 2017-10-11 RX ADMIN — CEFTRIAXONE 2 G: 2 INJECTION, SOLUTION INTRAVENOUS at 08:06

## 2017-10-11 RX ADMIN — FENTANYL CITRATE 50 MCG: 50 INJECTION, SOLUTION INTRAMUSCULAR; INTRAVENOUS at 16:34

## 2017-10-11 RX ADMIN — HYDROMORPHONE HYDROCHLORIDE 1 MG: 1 INJECTION, SOLUTION INTRAMUSCULAR; INTRAVENOUS; SUBCUTANEOUS at 10:04

## 2017-10-11 RX ADMIN — HYDROMORPHONE HYDROCHLORIDE 0.5 MG: 1 INJECTION, SOLUTION INTRAMUSCULAR; INTRAVENOUS; SUBCUTANEOUS at 16:50

## 2017-10-11 RX ADMIN — DOCUSATE SODIUM 100 MG: 100 CAPSULE ORAL at 09:00

## 2017-10-11 RX ADMIN — HYDROMORPHONE HYDROCHLORIDE 1 MG: 1 INJECTION, SOLUTION INTRAMUSCULAR; INTRAVENOUS; SUBCUTANEOUS at 00:47

## 2017-10-11 RX ADMIN — CLINDAMYCIN IN 5 PERCENT DEXTROSE 600 MG: 12 INJECTION, SOLUTION INTRAVENOUS at 14:00

## 2017-10-11 RX ADMIN — KETOROLAC TROMETHAMINE 30 MG: 30 INJECTION, SOLUTION INTRAMUSCULAR at 18:06

## 2017-10-11 RX ADMIN — SODIUM CHLORIDE 2910 ML: 9 INJECTION, SOLUTION INTRAVENOUS at 00:32

## 2017-10-11 RX ADMIN — CLINDAMYCIN IN 5 PERCENT DEXTROSE 600 MG: 12 INJECTION, SOLUTION INTRAVENOUS at 23:24

## 2017-10-11 RX ADMIN — OXYCODONE HYDROCHLORIDE 5 MG: 5 TABLET ORAL at 20:36

## 2017-10-11 RX ADMIN — DEXTROSE AND SODIUM CHLORIDE: 5; 900 INJECTION, SOLUTION INTRAVENOUS at 20:41

## 2017-10-11 RX ADMIN — DEXTROSE AND SODIUM CHLORIDE: 5; 900 INJECTION, SOLUTION INTRAVENOUS at 02:21

## 2017-10-11 RX ADMIN — ALBUTEROL SULFATE: 2.5 SOLUTION RESPIRATORY (INHALATION) at 16:10

## 2017-10-11 RX ADMIN — HYDROMORPHONE HYDROCHLORIDE 0.5 MG: 1 INJECTION, SOLUTION INTRAMUSCULAR; INTRAVENOUS; SUBCUTANEOUS at 16:40

## 2017-10-11 RX ADMIN — ACETAMINOPHEN 975 MG: 325 TABLET, FILM COATED ORAL at 00:19

## 2017-10-11 RX ADMIN — IOHEXOL 100 ML: 350 INJECTION, SOLUTION INTRAVENOUS at 02:30

## 2017-10-11 ASSESSMENT — PAIN SCALES - GENERAL
PAINLEVEL_OUTOF10: 6
PAINLEVEL_OUTOF10: ASSUMED PAIN PRESENT
PAINLEVEL_OUTOF10: 9
PAINLEVEL_OUTOF10: 10
PAINLEVEL_OUTOF10: 8
PAINLEVEL_OUTOF10: 6
PAINLEVEL_OUTOF10: ASSUMED PAIN PRESENT
PAINLEVEL_OUTOF10: ASSUMED PAIN PRESENT
PAINLEVEL_OUTOF10: 8
PAINLEVEL_OUTOF10: 3
PAINLEVEL_OUTOF10: 7
PAINLEVEL_OUTOF10: 6
PAINLEVEL_OUTOF10: 6
PAINLEVEL_OUTOF10: 0
PAINLEVEL_OUTOF10: 0
PAINLEVEL_OUTOF10: 8

## 2017-10-11 ASSESSMENT — ENCOUNTER SYMPTOMS
SPEECH CHANGE: 0
BLURRED VISION: 0
SHORTNESS OF BREATH: 0
MYALGIAS: 0
SENSORY CHANGE: 0
COUGH: 0
FEVER: 1
ABDOMINAL PAIN: 1
VOMITING: 0
HEADACHES: 0
NAUSEA: 0

## 2017-10-11 NOTE — PROGRESS NOTES
Dr. Patterson in to see pt. Paged Dr. Duval to Dr. Patterson. Concern for possible need for surgery. Pt. Teary and emotional after Dr. Sanches. Worried about possible surgery. States she will not take any more pain med if it's covering up the need for surgery.

## 2017-10-11 NOTE — OR SURGEON
Operative Report    PreOp Diagnosis: Tubal Ovarian Abscess    PostOp Diagnosis: Same    Procedure(s):  ABDOMINAL HYSTERECTOMY TOTAL - Wound Class: Clean Contaminated  LEFT OOPHORECTOMY - Wound Class: Clean Contaminated  BILATERAL SALPINGECTOMY - Wound Class: Clean Contaminated  LYSIS ADHESIONS GYN - Wound Class: Clean Contaminated    Surgeon(s):  LORENZO Montano M.D.    Anesthesiologist/Type of Anesthesia:  Anesthesiologist: Patrick Chavez M.D./General    Surgical Staff:  Circulator: Paco Vidales R.N.  Scrub Person: David Montana R.N.  [place]: Antoni Arriaga R.N.    Specimens:  Uterus, bilateral tubes, left ovary    Estimated Blood Loss: 500 cc    Findings: Extensive adhesions involving uterus and bowel to anterior abdominal wall, left ovary 7 cm x 5 cm, necrotic with purulence, uterus purulent and necrotic, bilateral tubes edematous, right ovary appeared normal.  Large bowl with fibrinous exudate.     Complications: None        10/11/2017 3:57 PM Barbra Ulloa

## 2017-10-11 NOTE — PROGRESS NOTES
Infectious Disease Progress Note    Author: Esther Patterson M.D. Date & Time of service: 10/11/2017  12:17 PM    Chief Complaint:  Sepsis    Interval History:  Labs Reviewed, Medications Reviewed, Radiology Reviewed and Wound Reviewed.  31-year-old female with recent  admitted with sepsis and abdominal pain  10/10/17-MAXIMUM TEMPERATURE 99.3. Patient very anxious and crying. Complains of diffuse sweating  Review of Systems:  Review of Systems   Constitutional: Positive for fever.        Diffuse sweats   Eyes: Negative for blurred vision.   Respiratory: Negative for cough and shortness of breath.    Cardiovascular: Negative for chest pain and leg swelling.   Gastrointestinal: Positive for abdominal pain. Negative for nausea and vomiting.   Genitourinary: Negative for dysuria.   Musculoskeletal: Negative for myalgias.   Skin: Negative for rash.   Neurological: Negative for sensory change, speech change and headaches.       Hemodynamics:  Temp (24hrs), Av.7 °C (99.9 °F), Min:36.8 °C (98.2 °F), Max:38.9 °C (102 °F)  Temperature: 36.8 °C (98.2 °F)  Pulse  Av  Min: 79  Max: 145   Blood Pressure: 111/71       Physical Exam:  Physical Exam   Constitutional: She is oriented to person, place, and time.   Postop and sleepy   HENT:   Mouth/Throat: No oropharyngeal exudate.   Eyes: No scleral icterus.   Neck: Neck supple.   Cardiovascular: Regular rhythm.    No murmur heard.  Pulmonary/Chest: She has no wheezes. She has no rales.   Abdominal: Soft. There is tenderness. There is no guarding.   Surgical bandage   Neurological: She is alert and oriented to person, place, and time.   Psychiatric: Her mood appears anxious.   Crying   Vitals reviewed.      Meds:    Current Facility-Administered Medications:   •  oxycodone immediate-release **OR** oxycodone immediate-release  •  cefTRIAXone (ROCEPHIN) IVPB  •  clindamycin (CLEOCIN) IV  •  acetaminophen  •  ondansetron  •  D5 NS  •  hydromorphone  •  docusate  sodium  •  ibuprofen  •  gabapentin  •  prenatal plus vitamin  •  hydrOXYzine    Labs:  Recent Labs      10/08/17   2000  10/10/17   1515  10/10/17   2356   WBC  11.8*  12.8*  11.9*   RBC  3.96*  3.38*  3.48*   HEMOGLOBIN  12.4  10.3*  10.4*   HEMATOCRIT  35.3*  30.7*  32.1*   MCV  89.1  90.8  92.2   MCH  31.3  30.5  29.9   RDW  42.7  45.1  47.5   PLATELETCT  186  211  231   MPV  10.0  9.9  9.8   NEUTSPOLYS  78.00*  71.30  69.50   LYMPHOCYTES  12.30*  14.80*  14.50*   MONOCYTES  0.90  8.40  10.90   EOSINOPHILS  0.90  2.90  1.90   BASOPHILS  0.00  0.30  0.80   RBCMORPHOLO  Normal   --    --      Recent Labs      10/08/17   2000  10/10/17   2356   SODIUM  134*  141   POTASSIUM  3.3*  3.4*   CHLORIDE  104  112   CO2  19*  19*   GLUCOSE  107*  90   BUN  10  6*     Recent Labs      10/08/17   2000  10/10/17   2356   ALBUMIN  3.5  3.1*   TBILIRUBIN  1.2  0.5   ALKPHOSPHAT  93  92   TOTPROTEIN  7.4  6.5   ALTSGPT  9  8   ASTSGOT  11*  12   CREATININE  0.81  1.37       Imaging:  Ct-abdomen-pelvis With    Result Date: 10/8/2017  10/8/2017 9:57 PM HISTORY/REASON FOR EXAM:  Lower abdominal pain, fever, chills.  section  TECHNIQUE/EXAM DESCRIPTION:  CT scan of the abdomen and pelvis with contrast. Contrast-enhanced helical scanning was obtained from the diaphragmatic domes through the pubic symphysis following the bolus administration of nonionic contrast without complication. 100 mL of Omnipaque 350 nonionic contrast was administered without complication. Low dose optimization technique was utilized for this CT exam including automated exposure control and adjustment of the mA and/or kV according to patient size. COMPARISON: Pelvic ultrasound 2017 FINDINGS: Lung bases: The lung bases are clear. Osseous structures:  Within normal limits. Abdomen: Uterus is enlarged consistent with being recently postpartum. There is fluid anterior and to the left of the uterus. No loculated collection is identified.  Density is approximately 53 Hounsfield unit consistent with hemorrhage. The ovaries are not effectively identified with CT. The liver shows decrease in density that is consistent with fatty infiltration. There is no biliary dilation. The spleen is normal in appearance. The pancreas is normal in appearance. The splenic vein and portal vein enhance normally. Both adrenal glands are normal in appearance. The right kidney is normal in appearance. The left kidney is normal in appearance. There is no hydronephrosis. Bowel and mesentery: Within normal limits. The aorta is normal in appearance. The terminal ileum is normal in appearance. The appendix is identified.     1.  Small amount of hemorrhage anterior/left of the uterus 2.  No abscess 3.  Fatty infiltration of the liver    Dx-chest-portable (1 View)    Result Date: 10/8/2017  10/8/2017 9:20 PM HISTORY/REASON FOR EXAM:  Weakness and fever. TECHNIQUE/EXAM DESCRIPTION AND NUMBER OF VIEWS: Single portable view of the chest. COMPARISON: None FINDINGS: The lungs are clear. Cardiac silhouette: normal in size. Pleura: There are no pleural effusion or pneumothoraces. Osseous structures: No significant bony abnormality is present.     Negative single view of the chest.    Us-gyn-pelvis Transvaginal    Result Date: 10/9/2017  10/9/2017 9:21 AM HISTORY/REASON FOR EXAM:  Abdominal pain. Postpartum x5 weeks following . TECHNIQUE/EXAM DESCRIPTION: Transvaginal pelvic ultrasound. COMPARISON:   Correlation is made with CT abdomen and pelvis dated 10/8/2017. FINDINGS: Both transabdominal and transvaginal scanning was performed to optimally visualize the pelvis. The uterus measures 5.06 cm x 10.50 cm x 5.77 cm. The uterine myometrium is heterogeneous. The endometrial echo complex measures 0.36 cm. There is trace fluid in the endometrial canal.. Low resistive waveforms are confirmed within the ovaries. The right ovary measures 2.89 cm x 3.50 cm x 2.25 cm. The left adnexal  "region measures 5.04 cm x 5.44 cm x 7.91 cm. There is a heterogeneous 6.8 x 2.6 cm region in or adjacent to the left ovary with internal flow. There is trace free fluid adjacent to the left adnexal structures. There is trace free fluid in the pelvic cul-de-sac.     1.  Heterogeneous region measuring 6.8 x 2.6 cm in or adjacent to the left ovary with internal flow. This is of uncertain etiology and significance. Given internal flow, finding does not definitely appear consistent with hemorrhage. 2.  Trace fluid in the endometrial canal. 3.  There is minimal trace fluid in the left adnexal region.      Micro:  Results     Procedure Component Value Units Date/Time    BLOOD CULTURE [[id number]]     Order Status:  Sent Specimen:  Blood from Peripheral     BLOOD CULTURE [[id number]]     Order Status:  Sent Specimen:  Blood from Peripheral     SENSITIVITY, E-TEST [[id number]] Collected:  10/08/17 2005    Order Status:  Completed Specimen:  Blood Updated:  10/11/17 0842     ETEST Sensitivity FINAL    Narrative:       141 tel. [phone] 10/10/2017, 08:25, RB PERF. RESULTS CALLED TO:[employer] w[employer]  Per Hospital Policy: Only change Specimen Src: to \"Line\" if  specified by physician order.    BLOOD CULTURE [[id number]]  (Abnormal)  (Susceptibility) Collected:  10/08/17 2005    Order Status:  Completed Specimen:  Blood from Peripheral Updated:  10/11/17 0842     Significant Indicator POS (POS)     Source BLD     Site PERIPHERAL     Blood Culture Growth detected by Bactec instrument.  10/09/2017  09:27 (A)     Blood Culture Beta Hemolytic Streptococcus group A (A)    Narrative:       CALL  Allen  141 tel. [phone],  CALLED  141 tel. [phone] 10/10/2017, 08:25, RB PERF. RESULTS CALLED  TO:Alondra [id number] w/Grp A  Per Hospital Policy: Only change Specimen Src: to \"Line\" if  specified by physician order.    Culture & Susceptibility     BETA HEMOLYTIC STREPTOCOCCUS GROUP A     Antibiotic Sensitivity Microscan Unit Status    " "Cefotaxime Sensitive 0.023 mcg/mL Final    Penicillin Sensitive 0.016 mcg/mL Final                       URINE CULTURE(NEW) [[id number]]  (Abnormal) Collected:  10/08/17 2343    Order Status:  Completed Specimen:  Urine Updated:  10/11/17 0812     Significant Indicator POS (POS)     Source UR     Site --     Urine Culture Mixed skin chrissie 10-50,000 cfu/mL (A)     Urine Culture -- (A)     Streptococcus agalactiae (Group B)  <10,000 cfu/mL      Narrative:       CALL  Allen  141 tel. [phone],  CALLED  141 tel. [phone] 10/11/2017, 08:12, CALL CANCELLED - RESULTS FAXED  Indication for culture:->Emergency Room Patient    BLOOD CULTURE [[id number]] Collected:  10/08/17 2155    Order Status:  Completed Specimen:  Blood from Peripheral Updated:  10/09/17 0710     Significant Indicator NEG     Source BLD     Site PERIPHERAL     Blood Culture --     No Growth    Note: Blood cultures are incubated for 5 days and  are monitored continuously.Positive blood cultures  are called to the RN and reported as soon as  they are identified.      Narrative:       Per Hospital Policy: Only change Specimen Src: to \"Line\" if  specified by physician order.    INFLUENZA RAPID [[id number]] Collected:  10/09/17 0047    Order Status:  Completed Specimen:  Respirate from Respiratory Updated:  10/09/17 0136     Significant Indicator NEG     Source RESP     Site RESPIRATORY     Rapid Influenza A-B --     Negative for Influenza A and Influenza B antigens.  Infection due to influenza A or B cannot be ruled out  since the antigen present in the specimen may be below the  detection limit of the test. Culture confirmation of  negative samples is recommended.      URINALYSIS [[id number]]  (Abnormal) Collected:  10/08/17 2343    Order Status:  Completed Specimen:  Urine Updated:  10/09/17 0011     Color Yellow     Character Clear     Specific Gravity 1.014     Ph 5.5     Glucose Negative mg/dL      Ketones Negative mg/dL      Protein Negative mg/dL      " Bilirubin Negative     Urobilinogen, Urine 0.2     Nitrite Negative     Leukocyte Esterase Small (A)     Occult Blood Small (A)     Micro Urine Req Microscopic    Narrative:       Indication for culture:->Emergency Room Patient    URINALYSIS [[id number]]     Order Status:  Canceled Specimen:  Urine     BLOOD CULTURE [[id number]]     Order Status:  Sent Specimen:  Blood from Peripheral     BLOOD CULTURE [[id number]]     Order Status:  Sent Specimen:  Blood from Peripheral     URINE CULTURE(NEW) [[id number]] Collected:  10/08/17 0000    Order Status:  Canceled Specimen:  Urine           Assessment:  Active Hospital Problems    Diagnosis   • Sepsis (CMS-[facility]) [A41.9]   • Abdominal pain [R10.9]       Plan:  Group A strep sepsis  Blood cultures are positive from 10/8/17  Discontinue Zosyn and vancomycin  Start Clinda and Rocephin  Patient is already discarding her breast milk. Advised strictly not to breast-feed the baby while on clindamycin.    Tubo-ovarian abscess  Underwent total abdominal hysterectomy, bilateral salpingectomy and left nephrectomy with extensive lysis of adhesions on 10/11/17    Leukocytosis  Due to above  Repeat CBC  Discussed with OB/GYN

## 2017-10-11 NOTE — PROGRESS NOTES
Pt. Teary eyed, asking to page ID, stating she needs surgery now because the pain has come back. Temp. 99.2f encouraged to take something for pain as she has not had anything since 1214 today. She agreed to try a norco.

## 2017-10-11 NOTE — PROGRESS NOTES
MD at bedside.  Plan is to get patient into the OR today.  Make patient strict NPO at this time per MD.

## 2017-10-11 NOTE — PROGRESS NOTES
Progress Note    Subjective:   Patient with multiple nonspecific abdominal complaints. Patient states she has been ambulating, tolerating clear liquids but also reports feeling very hungry. She reports passing gas but also states she is having some gas pain and also pain associated with bowel movements. She has recently had a bowel movement since her admission.    Objective Data:  Recent Labs      10/08/17   2000  10/10/17   1515  10/10/17   2356   WBC  11.8*  12.8*  11.9*   RBC  3.96*  3.38*  3.48*   HEMOGLOBIN  12.4  10.3*  10.4*   HEMATOCRIT  35.3*  30.7*  32.1*   MCV  89.1  90.8  92.2   MCH  31.3  30.5  29.9   MCHC  35.1*  33.6  32.4*   RDW  42.7  45.1  47.5   PLATELETCT  186  211  231   MPV  10.0  9.9  9.8     Recent Labs      10/08/17   2000   SODIUM  134*   POTASSIUM  3.3*   CHLORIDE  104   CO2  19*   GLUCOSE  107*   BUN  10   CREATININE  0.81   CALCIUM  9.5       Vitals:    10/10/17 1750 10/10/17 1945 10/10/17 2233 10/10/17 2300   BP:  110/74  106/72   Pulse:  (!) 115  (!) 120   Resp:  18  19   Temp: 37.9 °C (100.2 °F) 37.4 °C (99.4 °F) (!) 38.1 °C (100.5 °F) (!) 38.9 °C (102 °F)   TempSrc:       SpO2:  93%  90%   Weight:       Height:         Well-developed well-nourished female in no apparent distress  Patient's heart is tachycardic  Lungs were clear to auscultation bilaterally  Patient has good bowel sounds  Abdomen is slightly distended, minimal rebound  Diffuse tenderness throughout  Perineum is examined no obvious lesions or abscesses  Difficult to perform fundal assessment due to patient's voluntary guarding  Extremities no clubbing cyanosis or edema    Intake/Output Summary (Last 24 hours) at 10/11/17 0049  Last data filed at 10/11/17 0000   Gross per 24 hour   Intake             2818 ml   Output              550 ml   Net             2268 ml       Current Facility-Administered Medications   Medication Dose Route Frequency Provider Last Rate Last Dose   • oxycodone immediate-release (ROXICODONE)  tablet 5 mg  5 mg Oral Q4HRS PRN Yaneth Alberts M.D.        Or   • oxycodone immediate release (ROXICODONE) tablet 10 mg  10 mg Oral Q4HRS PRN Yaneth Alberts M.D.   10 mg at 10/11/17 0019   • cefTRIAXone (ROCEPHIN) 2 g in 50 mL D5W IVPB premix  2 g Intravenous Q24HRS Esther Patterson M.D.   Stopped at 10/10/17 1545   • clindamycin (CLEOCIN) IVPB premix 600 mg  600 mg Intravenous Q8HRS Esther Patterson M.D.   Stopped at 10/10/17 2202   • acetaminophen (TYLENOL) tablet 975 mg  975 mg Oral Q6HRS PRN Yaneth Alberts M.D.   975 mg at 10/11/17 001   • ondansetron (ZOFRAN) syringe/vial injection 4 mg  4 mg Intravenous Q6HRS PRN Leodan Kuo M.D.       • D5 NS infusion   Intravenous Continuous Barbra Ulloa M.D. 125 mL/hr at 10/10/17 2033     • HYDROmorphone (DILAUDID) injection 1 mg  1 mg Intravenous Q3HRS PRN Barbra Ulloa M.D.   1 mg at 10/10/17 2132   • docusate sodium (COLACE) capsule 100 mg  100 mg Oral BID Barbra Ulloa M.D.   100 mg at 10/10/17 2033   • ibuprofen (MOTRIN) tablet 800 mg  800 mg Oral Q8HRS PRN Barbra Ulloa M.D.   800 mg at 10/10/17 2256   • gabapentin (NEURONTIN) capsule 600 mg  600 mg Oral BID Barbra Ulloa M.D.   600 mg at 10/10/17 2033   • prenatal plus vitamin (STUARTNATAL 1+1) 27-1 MG tablet 1 Tab  1 Tab Oral DAILY Barbra Ulloa M.D.   1 Tab at 10/10/17 0947   • hydrOXYzine (ATARAX) tablet 50 mg  50 mg Oral TID PRN Barbra Ulloa M.D.   50 mg at 10/10/17 1746       Assessment: 31-year-old female who is status post  delivery approximately 5 weeks ago readmitted with fever  Now with group A strep bacteremia    Plan: Difficult to assess patient due to her labile emotional state  Patient is complaining of pain and diffuse tenderness with palpation however the patient herself is touching her stomach and moving around in the bed and does not seem to be in any real discomfort  However patient's temperature has increased  Will bolus  Repeat CT scan  Discussed clinical  circumstances with patient, most likely will need to return to operating room for TULIO with LSO, possible RSO

## 2017-10-11 NOTE — OR NURSING
Pt arrived to PACU, monitors applied and report received from MD and RN. Pt sedated on arrival with oral airway in place. Pt required chin tilt while airway in place.   Will continue to monitor

## 2017-10-11 NOTE — PROGRESS NOTES
Complaining of abd. Pain. Requests dilaudid. Dilaudid given with good pain relief. Up ambulating around room. Infant at bedside. vss.

## 2017-10-11 NOTE — PROGRESS NOTES
Patient spiked temp of 102.0 F, , RR 19, /72. SpO2 90% on RA.  Patient diaphoretic and clammy.  Dr. Duval notified.  New orders received.  Dr. Duval to round on patient.

## 2017-10-11 NOTE — TELEPHONE ENCOUNTER
Pt called stating if can send notes from Dr Ulloa stating that she has had complications during delivery  and needs an extension on her fmla. Pt said that we need to send the notes to [facility]

## 2017-10-11 NOTE — PROGRESS NOTES
Patient ambulated to bathroom and around unit with RN, stand-by assistance.  States pain is 6/10 while ambulating.  RN educated patient on reducing pain with non-pharm interventions such as distraction.      Call from Dr. Duval, informed patient and family she will round on patient today.  CT results given to patient per MD verbal orders.  All questions answered at this time.  No other needs.

## 2017-10-11 NOTE — PROGRESS NOTES
Requesting dilaudid for complaints of abd. Pain. Temp. Rechecked and temp is 100.2 temporal. Dilaudid given.

## 2017-10-11 NOTE — PROGRESS NOTES
Clinical course discussed in detail with patient.  She continues with abdominal pain and fevers despite 48 hours of antibiotics for suspected Tubal-ovarian abscess.  Plan to proceed with exploratory laparotomy, total abdominal hysterectomy, and bilateral salpingo-oophorectomy with indicated procedures.  If the right ovary appears normal, we may leave the ovary in place to avoid making her menopausal.     We discussed the risks of surgery in detail, to include but not limited to bleeding, infection, transfusion, damage to surrounding organs (bowel, bladder, ureter, nerves, vessels), need for repair, need for future surgery, unexpected pathology, unexpected complications, anesthesia risks, need for lifelong hormone replacement therapy if made menopausal, and prolonged hospitalization.  She understands the risks and was given opportunity to answer questions, and agrees with the plan.

## 2017-10-11 NOTE — PROGRESS NOTES
Received report from evening RN.  Assumed care of patient.  Patient A&Ox4.  C/O pain 8/10, medicated per MAR.  No complaints of nausea or vomiting.  No numbness/tingling.  CARPENTER, strength 4+.  NPO at this time.  IVF running per MAR.  Healing abdominal incisions.  Plan of care discussed.  Call light within reach.  Bed in lowest position.

## 2017-10-12 LAB
ALBUMIN SERPL BCP-MCNC: 2.6 G/DL (ref 3.2–4.9)
ALBUMIN/GLOB SERPL: 0.8 G/DL
ALP SERPL-CCNC: 87 U/L (ref 30–99)
ALT SERPL-CCNC: 8 U/L (ref 2–50)
ANION GAP SERPL CALC-SCNC: 11 MMOL/L (ref 0–11.9)
AST SERPL-CCNC: 12 U/L (ref 12–45)
BILIRUB SERPL-MCNC: 0.3 MG/DL (ref 0.1–1.5)
BUN SERPL-MCNC: 7 MG/DL (ref 8–22)
CALCIUM SERPL-MCNC: 8.2 MG/DL (ref 8.5–10.5)
CHLORIDE SERPL-SCNC: 114 MMOL/L (ref 96–112)
CO2 SERPL-SCNC: 20 MMOL/L (ref 20–33)
CREAT SERPL-MCNC: 1.28 MG/DL (ref 0.5–1.4)
ERYTHROCYTE [DISTWIDTH] IN BLOOD BY AUTOMATED COUNT: 48.5 FL (ref 35.9–50)
GFR SERPL CREATININE-BSD FRML MDRD: 49 ML/MIN/1.73 M 2
GLOBULIN SER CALC-MCNC: 3.1 G/DL (ref 1.9–3.5)
GLUCOSE SERPL-MCNC: 138 MG/DL (ref 65–99)
HCT VFR BLD AUTO: 25.8 % (ref 37–47)
HGB BLD-MCNC: 8.6 G/DL (ref 12–16)
MCH RBC QN AUTO: 30.7 PG (ref 27–33)
MCHC RBC AUTO-ENTMCNC: 33.3 G/DL (ref 33.6–35)
MCV RBC AUTO: 92.1 FL (ref 81.4–97.8)
PLATELET # BLD AUTO: 309 K/UL (ref 164–446)
PMV BLD AUTO: 9.7 FL (ref 9–12.9)
POTASSIUM SERPL-SCNC: 3.3 MMOL/L (ref 3.6–5.5)
PROT SERPL-MCNC: 5.7 G/DL (ref 6–8.2)
RBC # BLD AUTO: 2.8 M/UL (ref 4.2–5.4)
SODIUM SERPL-SCNC: 145 MMOL/L (ref 135–145)
WBC # BLD AUTO: 15.1 K/UL (ref 4.8–10.8)

## 2017-10-12 PROCEDURE — A9270 NON-COVERED ITEM OR SERVICE: HCPCS | Performed by: OBSTETRICS & GYNECOLOGY

## 2017-10-12 PROCEDURE — 700102 HCHG RX REV CODE 250 W/ 637 OVERRIDE(OP): Performed by: INTERNAL MEDICINE

## 2017-10-12 PROCEDURE — 700111 HCHG RX REV CODE 636 W/ 250 OVERRIDE (IP): Performed by: OBSTETRICS & GYNECOLOGY

## 2017-10-12 PROCEDURE — 770006 HCHG ROOM/CARE - MED/SURG/GYN SEMI*

## 2017-10-12 PROCEDURE — 700105 HCHG RX REV CODE 258: Performed by: OBSTETRICS & GYNECOLOGY

## 2017-10-12 PROCEDURE — 700112 HCHG RX REV CODE 229: Performed by: OBSTETRICS & GYNECOLOGY

## 2017-10-12 PROCEDURE — 700102 HCHG RX REV CODE 250 W/ 637 OVERRIDE(OP): Performed by: OBSTETRICS & GYNECOLOGY

## 2017-10-12 PROCEDURE — 700101 HCHG RX REV CODE 250: Performed by: INTERNAL MEDICINE

## 2017-10-12 PROCEDURE — 80053 COMPREHEN METABOLIC PANEL: CPT

## 2017-10-12 PROCEDURE — 700111 HCHG RX REV CODE 636 W/ 250 OVERRIDE (IP): Performed by: INTERNAL MEDICINE

## 2017-10-12 PROCEDURE — 36415 COLL VENOUS BLD VENIPUNCTURE: CPT

## 2017-10-12 PROCEDURE — A9270 NON-COVERED ITEM OR SERVICE: HCPCS | Performed by: INTERNAL MEDICINE

## 2017-10-12 PROCEDURE — 85027 COMPLETE CBC AUTOMATED: CPT

## 2017-10-12 RX ORDER — CLINDAMYCIN HYDROCHLORIDE 150 MG/1
300 CAPSULE ORAL 4 TIMES DAILY
Status: DISCONTINUED | OUTPATIENT
Start: 2017-10-12 | End: 2017-10-16 | Stop reason: HOSPADM

## 2017-10-12 RX ADMIN — HYDROMORPHONE HYDROCHLORIDE 0.5 MG: 1 INJECTION, SOLUTION INTRAMUSCULAR; INTRAVENOUS; SUBCUTANEOUS at 06:15

## 2017-10-12 RX ADMIN — KETOROLAC TROMETHAMINE 30 MG: 30 INJECTION, SOLUTION INTRAMUSCULAR at 06:03

## 2017-10-12 RX ADMIN — OXYCODONE HYDROCHLORIDE 5 MG: 5 TABLET ORAL at 22:15

## 2017-10-12 RX ADMIN — OXYCODONE HYDROCHLORIDE 5 MG: 5 TABLET ORAL at 17:52

## 2017-10-12 RX ADMIN — KETOROLAC TROMETHAMINE 30 MG: 30 INJECTION, SOLUTION INTRAMUSCULAR at 17:51

## 2017-10-12 RX ADMIN — DEXTROSE AND SODIUM CHLORIDE: 5; 900 INJECTION, SOLUTION INTRAVENOUS at 14:01

## 2017-10-12 RX ADMIN — OXYCODONE HYDROCHLORIDE 5 MG: 5 TABLET ORAL at 09:58

## 2017-10-12 RX ADMIN — GABAPENTIN 600 MG: 300 CAPSULE ORAL at 08:37

## 2017-10-12 RX ADMIN — ONDANSETRON 4 MG: 2 INJECTION, SOLUTION INTRAMUSCULAR; INTRAVENOUS at 00:17

## 2017-10-12 RX ADMIN — KETOROLAC TROMETHAMINE 30 MG: 30 INJECTION, SOLUTION INTRAMUSCULAR at 11:17

## 2017-10-12 RX ADMIN — GABAPENTIN 600 MG: 300 CAPSULE ORAL at 20:49

## 2017-10-12 RX ADMIN — OXYCODONE HYDROCHLORIDE 5 MG: 5 TABLET ORAL at 14:01

## 2017-10-12 RX ADMIN — DOCUSATE SODIUM 100 MG: 100 CAPSULE ORAL at 08:38

## 2017-10-12 RX ADMIN — IBUPROFEN 800 MG: 800 TABLET, FILM COATED ORAL at 00:17

## 2017-10-12 RX ADMIN — DEXTROSE AND SODIUM CHLORIDE: 5; 900 INJECTION, SOLUTION INTRAVENOUS at 05:07

## 2017-10-12 RX ADMIN — OXYCODONE HYDROCHLORIDE 5 MG: 5 TABLET ORAL at 05:07

## 2017-10-12 RX ADMIN — CLINDAMYCIN IN 5 PERCENT DEXTROSE 600 MG: 12 INJECTION, SOLUTION INTRAVENOUS at 06:03

## 2017-10-12 RX ADMIN — Medication 1 TABLET: at 08:37

## 2017-10-12 RX ADMIN — CLINDAMYCIN IN 5 PERCENT DEXTROSE 600 MG: 12 INJECTION, SOLUTION INTRAVENOUS at 14:01

## 2017-10-12 RX ADMIN — CLINDAMYCIN HYDROCHLORIDE 300 MG: 150 CAPSULE ORAL at 20:49

## 2017-10-12 RX ADMIN — CEFTRIAXONE 2 G: 2 INJECTION, SOLUTION INTRAVENOUS at 08:38

## 2017-10-12 ASSESSMENT — ENCOUNTER SYMPTOMS
ABDOMINAL PAIN: 1
COUGH: 0
SPEECH CHANGE: 0
ROS GI COMMENTS: IMPROVED
VOMITING: 0
NAUSEA: 0
MYALGIAS: 0
SENSORY CHANGE: 0
FEVER: 0
HEADACHES: 0
SHORTNESS OF BREATH: 0
BLURRED VISION: 0

## 2017-10-12 ASSESSMENT — PAIN SCALES - GENERAL
PAINLEVEL_OUTOF10: 5
PAINLEVEL_OUTOF10: 7
PAINLEVEL_OUTOF10: 3
PAINLEVEL_OUTOF10: 9
PAINLEVEL_OUTOF10: 9
PAINLEVEL_OUTOF10: 7

## 2017-10-12 NOTE — PROGRESS NOTES
Infectious Disease Progress Note    Author: Esther Patterson M.D. Date & Time of service: 10/12/2017  3:49 PM    Chief Complaint:  Sepsis    Interval History:  Labs Reviewed, Medications Reviewed, Radiology Reviewed and Wound Reviewed.  31-year-old female with recent  admitted with sepsis and abdominal pain  10/10/17-MAXIMUM TEMPERATURE 99.3. Patient very anxious and crying. Complains of diffuse sweating  10/12/17-MAXIMUM TEMPERATURE 100.1. Says she feels much better. WBC 15  Review of Systems:  Review of Systems   Constitutional: Positive for malaise/fatigue. Negative for fever.        Diffuse sweats   Eyes: Negative for blurred vision.   Respiratory: Negative for cough and shortness of breath.    Cardiovascular: Negative for chest pain and leg swelling.   Gastrointestinal: Positive for abdominal pain. Negative for nausea and vomiting.        Improved   Genitourinary: Negative for dysuria.   Musculoskeletal: Negative for myalgias.   Skin: Negative for rash.   Neurological: Negative for sensory change, speech change and headaches.       Hemodynamics:  Temp (24hrs), Av.3 °C (99.1 °F), Min:36.8 °C (98.2 °F), Max:37.8 °C (100.1 °F)  Temperature: 36.8 °C (98.2 °F)  Pulse  Av.2  Min: 78  Max: 145Heart Rate (Monitored): (!) 104  Blood Pressure: 111/68, NIBP: 125/47       Physical Exam:  Physical Exam   Constitutional: She is oriented to person, place, and time. No distress.   HENT:   Mouth/Throat: No oropharyngeal exudate.   Eyes: No scleral icterus.   Neck: Neck supple.   Cardiovascular: Regular rhythm.    No murmur heard.  Pulmonary/Chest: She has no wheezes. She has no rales.   Abdominal: Soft. There is tenderness. There is no guarding.   Surgical bandage   Neurological: She is alert and oriented to person, place, and time.   Psychiatric: She has a normal mood and affect.   Vitals reviewed.      Meds:    Current Facility-Administered Medications:   •  ketorolac  •  ondansetron  •  simethicone  •   hydromorphone  •  oxycodone immediate-release **OR** oxycodone immediate-release  •  cefTRIAXone (ROCEPHIN) IVPB  •  clindamycin (CLEOCIN) IV  •  acetaminophen  •  ondansetron  •  D5 NS  •  docusate sodium  •  ibuprofen  •  gabapentin  •  prenatal plus vitamin  •  hydrOXYzine HCl    Labs:  Recent Labs      10/10/17   1515  10/10/17   2356  10/12/17   0315   WBC  12.8*  11.9*  15.1*   RBC  3.38*  3.48*  2.80*   HEMOGLOBIN  10.3*  10.4*  8.6*   HEMATOCRIT  30.7*  32.1*  25.8*   MCV  90.8  92.2  92.1   MCH  30.5  29.9  30.7   RDW  45.1  47.5  48.5   PLATELETCT  211  231  309   MPV  9.9  9.8  9.7   NEUTSPOLYS  71.30  69.50   --    LYMPHOCYTES  14.80*  14.50*   --    MONOCYTES  8.40  10.90   --    EOSINOPHILS  2.90  1.90   --    BASOPHILS  0.30  0.80   --      Recent Labs      10/10/17   2356  10/12/17   0936   SODIUM  141  145   POTASSIUM  3.4*  3.3*   CHLORIDE  112  114*   CO2  19*  20   GLUCOSE  90  138*   BUN  6*  7*     Recent Labs      10/10/17   2356  10/12/17   0936   ALBUMIN  3.1*  2.6*   TBILIRUBIN  0.5  0.3   ALKPHOSPHAT  92  87   TOTPROTEIN  6.5  5.7*   ALTSGPT  8  8   ASTSGOT  12  12   CREATININE  1.37  1.28       Imaging:  Ct-abdomen-pelvis With    Result Date: 10/8/2017  10/8/2017 9:57 PM HISTORY/REASON FOR EXAM:  Lower abdominal pain, fever, chills.  section  TECHNIQUE/EXAM DESCRIPTION:  CT scan of the abdomen and pelvis with contrast. Contrast-enhanced helical scanning was obtained from the diaphragmatic domes through the pubic symphysis following the bolus administration of nonionic contrast without complication. 100 mL of Omnipaque 350 nonionic contrast was administered without complication. Low dose optimization technique was utilized for this CT exam including automated exposure control and adjustment of the mA and/or kV according to patient size. COMPARISON: Pelvic ultrasound 2017 FINDINGS: Lung bases: The lung bases are clear. Osseous structures:  Within normal limits.  Abdomen: Uterus is enlarged consistent with being recently postpartum. There is fluid anterior and to the left of the uterus. No loculated collection is identified. Density is approximately 53 Hounsfield unit consistent with hemorrhage. The ovaries are not effectively identified with CT. The liver shows decrease in density that is consistent with fatty infiltration. There is no biliary dilation. The spleen is normal in appearance. The pancreas is normal in appearance. The splenic vein and portal vein enhance normally. Both adrenal glands are normal in appearance. The right kidney is normal in appearance. The left kidney is normal in appearance. There is no hydronephrosis. Bowel and mesentery: Within normal limits. The aorta is normal in appearance. The terminal ileum is normal in appearance. The appendix is identified.     1.  Small amount of hemorrhage anterior/left of the uterus 2.  No abscess 3.  Fatty infiltration of the liver    Dx-chest-portable (1 View)    Result Date: 10/8/2017  10/8/2017 9:20 PM HISTORY/REASON FOR EXAM:  Weakness and fever. TECHNIQUE/EXAM DESCRIPTION AND NUMBER OF VIEWS: Single portable view of the chest. COMPARISON: None FINDINGS: The lungs are clear. Cardiac silhouette: normal in size. Pleura: There are no pleural effusion or pneumothoraces. Osseous structures: No significant bony abnormality is present.     Negative single view of the chest.    Us-gyn-pelvis Transvaginal    Result Date: 10/9/2017  10/9/2017 9:21 AM HISTORY/REASON FOR EXAM:  Abdominal pain. Postpartum x5 weeks following . TECHNIQUE/EXAM DESCRIPTION: Transvaginal pelvic ultrasound. COMPARISON:   Correlation is made with CT abdomen and pelvis dated 10/8/2017. FINDINGS: Both transabdominal and transvaginal scanning was performed to optimally visualize the pelvis. The uterus measures 5.06 cm x 10.50 cm x 5.77 cm. The uterine myometrium is heterogeneous. The endometrial echo complex measures 0.36 cm. There is trace  fluid in the endometrial canal.. Low resistive waveforms are confirmed within the ovaries. The right ovary measures 2.89 cm x 3.50 cm x 2.25 cm. The left adnexal region measures 5.04 cm x 5.44 cm x 7.91 cm. There is a heterogeneous 6.8 x 2.6 cm region in or adjacent to the left ovary with internal flow. There is trace free fluid adjacent to the left adnexal structures. There is trace free fluid in the pelvic cul-de-sac.     1.  Heterogeneous region measuring 6.8 x 2.6 cm in or adjacent to the left ovary with internal flow. This is of uncertain etiology and significance. Given internal flow, finding does not definitely appear consistent with hemorrhage. 2.  Trace fluid in the endometrial canal. 3.  There is minimal trace fluid in the left adnexal region.      Micro:  Results     Procedure Component Value Units Date/Time    CULTURE WOUND W/ GRAM STAIN [[id number]]  (Abnormal) Collected:  10/11/17 1400    Order Status:  Completed Specimen:  Wound Updated:  10/12/17 1504     Significant Indicator POS (POS)     Source WND     Site Ovarian     Culture Result Wound -- (A)     Gram Stain Result -- (A)     Moderate WBCs.  Moderate Gram positive cocci.       Culture Result Wound -- (A)     Beta Hemolytic Streptococcus group A  Moderate growth      Narrative:       CALL  Allen  141 tel. [phone],  CALLED  141 tel. [phone] 10/12/2017, 13:50, RB PERF. RESULTS CALLED  TO:[id number](GRPA)    ANAEROBIC CULTURE [[id number]] Collected:  10/11/17 1400    Order Status:  Completed Specimen:  Wound Updated:  10/12/17 1504     Significant Indicator NEG     Source WND     Site Ovarian     Anaerobic Culture, Culture Res Culture in progress.    Narrative:       CALL  Allen  141 tel. [phone],  CALLED  141 tel. [phone] 10/12/2017, 13:50, RB PERF. RESULTS CALLED  TO:[id number](GRPA)    BLOOD CULTURE [[id number]] Collected:  10/11/17 1921    Order Status:  Completed Specimen:  Blood from Peripheral Updated:  10/12/17 0731     Significant Indicator  "NEG     Source BLD     Site PERIPHERAL     Blood Culture --     No Growth    Note: Blood cultures are incubated for 5 days and  are monitored continuously.Positive blood cultures  are called to the RN and reported as soon as  they are identified.      Narrative:       Per Hospital Policy: Only change Specimen Src: to \"Line\" if  specified by physician order.    BLOOD CULTURE [[id number]] Collected:  10/11/17 1914    Order Status:  Completed Specimen:  Blood from Peripheral Updated:  10/12/17 0731     Significant Indicator NEG     Source BLD     Site PERIPHERAL     Blood Culture --     No Growth    Note: Blood cultures are incubated for 5 days and  are monitored continuously.Positive blood cultures  are called to the RN and reported as soon as  they are identified.      Narrative:       Per Hospital Policy: Only change Specimen Src: to \"Line\" if  specified by physician order.    GRAM STAIN [[id number]] Collected:  10/11/17 1400    Order Status:  Completed Specimen:  Wound Updated:  10/11/17 2141     Significant Indicator .     Source WND     Site Ovarian     Gram Stain Result --     Moderate WBCs.  Moderate Gram positive cocci.      SENSITIVITY, E-TEST [[id number]] Collected:  10/08/17 2005    Order Status:  Completed Specimen:  Blood Updated:  10/11/17 0842     ETEST Sensitivity FINAL    Narrative:       141 tel. [phone] 10/10/2017, 08:25, RB PERF. RESULTS CALLED TO:Alondra Lee w/Grp A  Per Hospital Policy: Only change Specimen Src: to \"Line\" if  specified by physician order.    BLOOD CULTURE [[id number]]  (Abnormal)  (Susceptibility) Collected:  10/08/17 2005    Order Status:  Completed Specimen:  Blood from Peripheral Updated:  10/11/17 0842     Significant Indicator POS (POS)     Source BLD     Site PERIPHERAL     Blood Culture Growth detected by Bactec instrument.  10/09/2017  09:27 (A)     Blood Culture Beta Hemolytic Streptococcus group A (A)    Narrative:       CALL  Allen  141 tel. [phone],  CALLED  141 " "tel. [phone] 10/10/2017, 08:25, RB PERF. RESULTS CALLED  TO:Alondra [phone] w/Grp A  Per Hospital Policy: Only change Specimen Src: to \"Line\" if  specified by physician order.    Culture & Susceptibility     BETA HEMOLYTIC STREPTOCOCCUS GROUP A     Antibiotic Sensitivity Microscan Unit Status    Cefotaxime Sensitive 0.023 mcg/mL Final    Penicillin Sensitive 0.016 mcg/mL Final                       URINE CULTURE(NEW) [[id number]]  (Abnormal) Collected:  10/08/17 2343    Order Status:  Completed Specimen:  Urine Updated:  10/11/17 0812     Significant Indicator POS (POS)     Source UR     Site --     Urine Culture Mixed skin chrissie 10-50,000 cfu/mL (A)     Urine Culture -- (A)     Streptococcus agalactiae (Group B)  <10,000 cfu/mL      Narrative:       CALL  Allen  141 tel. [phone],  CALLED  141 tel. [phone] 10/11/2017, 08:12, CALL CANCELLED - RESULTS FAXED  Indication for culture:->Emergency Room Patient    BLOOD CULTURE [[id number]] Collected:  10/08/17 2155    Order Status:  Completed Specimen:  Blood from Peripheral Updated:  10/09/17 0710     Significant Indicator NEG     Source BLD     Site PERIPHERAL     Blood Culture --     No Growth    Note: Blood cultures are incubated for 5 days and  are monitored continuously.Positive blood cultures  are called to the RN and reported as soon as  they are identified.      Narrative:       Per Hospital Policy: Only change Specimen Src: to \"Line\" if  specified by physician order.    INFLUENZA RAPID [[id number]] Collected:  10/09/17 0047    Order Status:  Completed Specimen:  Respirate from Respiratory Updated:  10/09/17 0136     Significant Indicator NEG     Source RESP     Site RESPIRATORY     Rapid Influenza A-B --     Negative for Influenza A and Influenza B antigens.  Infection due to influenza A or B cannot be ruled out  since the antigen present in the specimen may be below the  detection limit of the test. Culture confirmation of  negative samples is recommended.   "    URINALYSIS [[id number]]  (Abnormal) Collected:  10/08/17 2343    Order Status:  Completed Specimen:  Urine Updated:  10/09/17 0011     Color Yellow     Character Clear     Specific Gravity 1.014     Ph 5.5     Glucose Negative mg/dL      Ketones Negative mg/dL      Protein Negative mg/dL      Bilirubin Negative     Urobilinogen, Urine 0.2     Nitrite Negative     Leukocyte Esterase Small (A)     Occult Blood Small (A)     Micro Urine Req Microscopic    Narrative:       Indication for culture:->Emergency Room Patient    URINALYSIS [[id number]]     Order Status:  Canceled Specimen:  Urine     URINE CULTURE(NEW) [[id number]] Collected:  10/08/17 0000    Order Status:  Canceled Specimen:  Urine     BLOOD CULTURE [[id number]] Collected:  10/08/17 0000    Order Status:  Canceled Specimen:  Other from Peripheral     BLOOD CULTURE [[id number]] Collected:  10/08/17 0000    Order Status:  Canceled Specimen:  Other from Peripheral           Assessment:  Active Hospital Problems    Diagnosis   • Sepsis (CMS-HCC) [A41.9]   • Abdominal pain [R10.9]       Plan:  Group A strep sepsis  Blood cultures are positive from 10/8/17  On clindamycin and Rocephin  Patient is already discarding her breast milk. Advised strictly not to breast-feed the baby while on clindamycin.  Change clindamycin to oral  Repeat blood cultures are negative from 10/11/17  Continue Rocephin through 10/25/17    Tubo-ovarian abscess  Underwent total abdominal hysterectomy, bilateral salpingectomy and left nephrectomy with extensive lysis of adhesions on 10/11/17  Wound cultures are group A strep as well    Leukocytosis  Due to above  Repeat CBC in a.m.  Discussed with OB/GYN

## 2017-10-12 NOTE — CARE PLAN
Problem: Pain Management  Goal: Pain level will decrease to patient's comfort goal  Patient with low pain tolerance. Educating patient that there will be pain, and using narcotics appropriately.

## 2017-10-12 NOTE — OR NURSING
Pt drowsy however oriented x4. VSS on 3 LNC. Pt reports pain ok at this time and states she want to go see her family. Surgical incisions CDI. Ice pack in place. Pt educated on splinting when coughing.   Report called to [facility] and pt transferred via bed.   Pt's mother notified of transfer.

## 2017-10-12 NOTE — OP REPORT
DATE OF SERVICE:  10/11/2017    PREOPERATIVE DIAGNOSES:  1.  Tubo-ovarian abscess.  2.  Bacteremia with group A Streptococcus.    POSTOPERATIVE DIAGNOSES:  1.  Tubo-ovarian abscess.  2.  Bacteremia with group A Streptococcus.    PROCEDURE PERFORMED:  Total abdominal hysterectomy, bilateral salpingectomy,   left oophorectomy and extensive lysis of adhesions.      SURGEON:  Dr. Barbra Ulloa.    FIRST ASSISTANT:  Dr. Kyra Blanca.    ANESTHESIA:  General endotracheal.    COMPLICATIONS:  None.    ESTIMATED BLOOD LOSS:  500 mL.    FLUIDS:  2200 mL of LR.    URINE OUTPUT:  900 mL of clear urine.    FINDINGS:  Approximately 14-week size, enlarged purulent necrotic uterus with   left ovary measuring approximately 7x5 cm with necrosis and purulence present.    Bilateral tubes were edematous.  The right ovary appeared normal.  Extensive   adhesions were noted involving the uterus, bowel, and bladder to the anterior abdominal   wall.  The bowel also had areas of fibrinous exudate near the level of the   left-sided abscess.    PROCEDURE IN DETAIL:  The patient was taken to the operating room where general   anesthesia was administered without difficulty.  She was then prepped and   draped in the supine position in the usual sterile fashion.  A Louis catheter   was inserted for drainage of the bladder.  A Pfannenstiel skin incision was   made approximately 2 cm above the symphysis pubis and extended to the   level of the rectus fascia with the Bovie. The fascia was then nicked in the midline and extended   laterally with the Bovie.  The muscles of the anterior abdominal wall were    off the fascia and  in the midline by sharp and blunt   dissection.  Extensive adhesions were noted involving the bowel, peritoneum   and the posterior surface of the fascia and these were lysed sharply.  The   peritoneum was entered and extensive lysis of adhesions was performed.  The   pelvis was examined with the findings as noted  above.  The left ovary was swabbed for routine and anaerobic cultures.  The bowel was packed away with moist laparotomy sponges.  A Pean clamp was placed on the uterine   cornu.  The uterine tissue was noted to be very friable and lacerated   easily.  The right round ligament was identified, transected, and suture   ligated with 0 Vicryl.  The posterior leaf of the broad ligament was dissected   and the right tube was removed with cautery.  The right utero-ovarian   ligament was identified.  The right ovary appeared to be normal without   evidence of abscess or infection.  The right utero-ovarian ligament was   doubly clamped, transected and suture ligated with 0 Vicryl.  Excellent hemostasis was   noted.  The tissue planes were markedly distorted and the usual landmarks were absent.  Dissection of the bladder   was performed anteriorly by opening the round ligament to the midline.    Tissue planes were difficult to identify.  The uterine artery was skeletonized   and noted to be away from the bladder and was clamped and suture ligated with   0 Vicryl.  At this time, the attention was then turned to the left side of   the pelvis where the left round ligament was identified and suture ligated   with 0 Vicryl.  The posterior broad ligament was dissected and the left   infundibulopelvic ligament was identified, doubly clamped and suture ligated.    Excellent hemostasis was noted.  The left ovary and tube were sharply   dissected away from the bowel.  Again, tissue planes were distorted and the   bladder was dissected anteriorly by opening up the broad ligament.  There was   no distinct bladder flap due to the dense adhesions.  The left uterine artery   was skeletonized and doubly clamped and suture ligated with 0 Vicryl.  At this   time, difficulty was encountered differentiating the bladder from the vagina and the   lower uterine segment of the uterus and cervix.  The bladder was retrograde   filled with 300 mL of  normal saline with indigo carmine to differentiate the   margin of the bladder from the cervix.  No bladder injury was noted.  At this   point, the bladder was drained and dissection continued.  The cardinal   ligaments of the cervix were then clamped and suture ligated as well as the   uterosacral ligaments of the cervix.  A distinct margin of the lower edge of   the cervix was not noted, but all identifiable cervical tissue was removed.  The cuff   was closed with a running locked suture of 0 Vicryl.  The pelvis was copiously   irrigated and hemostasis was noted.  At this point, all pedicle lines were   inspected and found to be hemostatic.  There was no further evidence of   purulence or abscess formation.  The bowel was inspected and noted to be   intact with areas of fibrinous exudate.  All laparotomy sponges and   instruments were removed from the abdomen.  There was no identifiable   peritoneum to close.  The rectus muscles were reapproximated with 2 horizontal   mattress sutures of 0 Vicryl.  The fascia was closed with a running suture of   0 PDS.  The subcuticular fat was irrigated.  Scarpas fascia was closed with   interrupted sutures of 0 Vicryl and the skin was closed with staples.  Sponge,   lap and needle counts were correct x2.  The patient was taken to the PACU   awake and in stable condition.       ____________________________________     Barbra Ulloa MD [facility] / [facility]    DD:  10/11/2017 16:24:24  DT:  10/11/2017 18:25:12    D#:  [id number]  Job#:  [id number]

## 2017-10-12 NOTE — TELEPHONE ENCOUNTER
Op notes, infectiuous disease notes, and original note for reason for having emergency surgery all sent to [facility] 10/12/17.

## 2017-10-12 NOTE — PROGRESS NOTES
Patient arrived on unit from PACU.  Assumed care of patient.  Patient A&Ox4, sleepy.  Family at bedside.  Patient had O2 saturations of 75% on 3L nasal canula.  Patient refusing to take deep breaths through her nose and placed on mask at 4L, with O2 saturations >90%.  Patient given toradol at this time for pain.  Abdominal incision with dressing CDI.  Fley to gravity with clear, yellow urine.  Plan of care discussed.  Call light within reach.  Bed in lowest position.

## 2017-10-12 NOTE — PROGRESS NOTES
A&O x4, drowsy but easily aroused.  Answers all questions appropriately.  VSS.   ml/hr. SpO2 SpO2 2L NC.    C/o 10/10 pain - medicated per MAR.  Denies N/V.  Transverse abdominal incision with dressing, CDI.   Louis to gravity.  Last BM 10/10/17.  Call light and personal belongings within reach.  POC discussed and all questions answered.  No additional needs at this time.      Patient refusing bed alarm at this time.  Educated on importance of calling RN/CNA if any assistance is needed and not to walk without assistance from staff.  Patient verbally demonstrates understanding.

## 2017-10-12 NOTE — PROGRESS NOTES
Assumed care of patient. Patient on the phone with insurance company in no apparent distress.  Once noticing RN at bedside, states she is in extreme pain. Pain medications not due at this time. Transverse abdominal incision to lower abdomen is dressed with clean, dry and intact dressing. Louis removed per active order. Patient educated on voiding within six hours and on bladder scan and straight cath policy. Patient verbalized understanding. No other needs at this time. Call light within reach.

## 2017-10-12 NOTE — CARE PLAN
Problem: Infection  Goal: Will remain free from infection  IV antibiotics ordered and administered.

## 2017-10-13 ENCOUNTER — APPOINTMENT (OUTPATIENT)
Dept: RADIOLOGY | Facility: MEDICAL CENTER | Age: 32
DRG: 769 | End: 2017-10-13
Attending: INTERNAL MEDICINE
Payer: COMMERCIAL

## 2017-10-13 LAB
BACTERIA BLD CULT: NORMAL
BACTERIA WND AEROBE CULT: ABNORMAL
BACTERIA WND AEROBE CULT: ABNORMAL
ERYTHROCYTE [DISTWIDTH] IN BLOOD BY AUTOMATED COUNT: 49.1 FL (ref 35.9–50)
GRAM STN SPEC: ABNORMAL
HCT VFR BLD AUTO: 22.7 % (ref 37–47)
HGB BLD-MCNC: 7.4 G/DL (ref 12–16)
MCH RBC QN AUTO: 30.3 PG (ref 27–33)
MCHC RBC AUTO-ENTMCNC: 32.6 G/DL (ref 33.6–35)
MCV RBC AUTO: 93 FL (ref 81.4–97.8)
PLATELET # BLD AUTO: 335 K/UL (ref 164–446)
PMV BLD AUTO: 9.4 FL (ref 9–12.9)
RBC # BLD AUTO: 2.44 M/UL (ref 4.2–5.4)
SIGNIFICANT IND 70042: ABNORMAL
SIGNIFICANT IND 70042: NORMAL
SITE SITE: ABNORMAL
SITE SITE: NORMAL
SOURCE SOURCE: ABNORMAL
SOURCE SOURCE: NORMAL
WBC # BLD AUTO: 12.5 K/UL (ref 4.8–10.8)

## 2017-10-13 PROCEDURE — 700102 HCHG RX REV CODE 250 W/ 637 OVERRIDE(OP): Performed by: OBSTETRICS & GYNECOLOGY

## 2017-10-13 PROCEDURE — 700111 HCHG RX REV CODE 636 W/ 250 OVERRIDE (IP): Performed by: INTERNAL MEDICINE

## 2017-10-13 PROCEDURE — A9270 NON-COVERED ITEM OR SERVICE: HCPCS | Performed by: OBSTETRICS & GYNECOLOGY

## 2017-10-13 PROCEDURE — 700111 HCHG RX REV CODE 636 W/ 250 OVERRIDE (IP): Performed by: OBSTETRICS & GYNECOLOGY

## 2017-10-13 PROCEDURE — 700105 HCHG RX REV CODE 258: Performed by: OBSTETRICS & GYNECOLOGY

## 2017-10-13 PROCEDURE — 700112 HCHG RX REV CODE 229: Performed by: OBSTETRICS & GYNECOLOGY

## 2017-10-13 PROCEDURE — 36415 COLL VENOUS BLD VENIPUNCTURE: CPT

## 2017-10-13 PROCEDURE — 85027 COMPLETE CBC AUTOMATED: CPT

## 2017-10-13 PROCEDURE — 700102 HCHG RX REV CODE 250 W/ 637 OVERRIDE(OP): Performed by: INTERNAL MEDICINE

## 2017-10-13 PROCEDURE — A9270 NON-COVERED ITEM OR SERVICE: HCPCS | Performed by: INTERNAL MEDICINE

## 2017-10-13 PROCEDURE — 76937 US GUIDE VASCULAR ACCESS: CPT

## 2017-10-13 PROCEDURE — 770001 HCHG ROOM/CARE - MED/SURG/GYN PRIV*

## 2017-10-13 RX ORDER — POTASSIUM CHLORIDE 20 MEQ/1
20 TABLET, EXTENDED RELEASE ORAL DAILY
Status: DISCONTINUED | OUTPATIENT
Start: 2017-10-13 | End: 2017-10-16 | Stop reason: HOSPADM

## 2017-10-13 RX ORDER — FERROUS SULFATE 325(65) MG
325 TABLET ORAL
Status: DISCONTINUED | OUTPATIENT
Start: 2017-10-13 | End: 2017-10-16 | Stop reason: HOSPADM

## 2017-10-13 RX ADMIN — KETOROLAC TROMETHAMINE 30 MG: 30 INJECTION, SOLUTION INTRAMUSCULAR at 11:33

## 2017-10-13 RX ADMIN — CLINDAMYCIN HYDROCHLORIDE 300 MG: 150 CAPSULE ORAL at 16:20

## 2017-10-13 RX ADMIN — CEFTRIAXONE 2 G: 2 INJECTION, SOLUTION INTRAVENOUS at 08:13

## 2017-10-13 RX ADMIN — KETOROLAC TROMETHAMINE 30 MG: 30 INJECTION, SOLUTION INTRAMUSCULAR at 16:17

## 2017-10-13 RX ADMIN — OXYCODONE HYDROCHLORIDE 5 MG: 5 TABLET ORAL at 03:20

## 2017-10-13 RX ADMIN — DOCUSATE SODIUM 100 MG: 100 CAPSULE ORAL at 20:07

## 2017-10-13 RX ADMIN — Medication 1 TABLET: at 08:13

## 2017-10-13 RX ADMIN — OXYCODONE HYDROCHLORIDE 5 MG: 5 TABLET ORAL at 20:15

## 2017-10-13 RX ADMIN — POTASSIUM CHLORIDE 20 MEQ: 1500 TABLET, EXTENDED RELEASE ORAL at 08:11

## 2017-10-13 RX ADMIN — Medication 325 MG: at 16:17

## 2017-10-13 RX ADMIN — DEXTROSE AND SODIUM CHLORIDE: 5; 900 INJECTION, SOLUTION INTRAVENOUS at 20:08

## 2017-10-13 RX ADMIN — DEXTROSE AND SODIUM CHLORIDE: 5; 900 INJECTION, SOLUTION INTRAVENOUS at 00:17

## 2017-10-13 RX ADMIN — OXYCODONE HYDROCHLORIDE 5 MG: 5 TABLET ORAL at 08:12

## 2017-10-13 RX ADMIN — Medication 325 MG: at 11:33

## 2017-10-13 RX ADMIN — GABAPENTIN 600 MG: 300 CAPSULE ORAL at 20:07

## 2017-10-13 RX ADMIN — KETOROLAC TROMETHAMINE 30 MG: 30 INJECTION, SOLUTION INTRAMUSCULAR at 23:34

## 2017-10-13 RX ADMIN — DEXTROSE AND SODIUM CHLORIDE: 5; 900 INJECTION, SOLUTION INTRAVENOUS at 08:11

## 2017-10-13 RX ADMIN — GABAPENTIN 600 MG: 300 CAPSULE ORAL at 08:11

## 2017-10-13 RX ADMIN — CLINDAMYCIN HYDROCHLORIDE 300 MG: 150 CAPSULE ORAL at 20:07

## 2017-10-13 RX ADMIN — CLINDAMYCIN HYDROCHLORIDE 300 MG: 150 CAPSULE ORAL at 12:41

## 2017-10-13 RX ADMIN — KETOROLAC TROMETHAMINE 30 MG: 30 INJECTION, SOLUTION INTRAMUSCULAR at 05:07

## 2017-10-13 RX ADMIN — OXYCODONE HYDROCHLORIDE 5 MG: 5 TABLET ORAL at 16:17

## 2017-10-13 RX ADMIN — CLINDAMYCIN HYDROCHLORIDE 300 MG: 150 CAPSULE ORAL at 08:11

## 2017-10-13 RX ADMIN — ACETAMINOPHEN 975 MG: 325 TABLET, FILM COATED ORAL at 03:23

## 2017-10-13 RX ADMIN — KETOROLAC TROMETHAMINE 30 MG: 30 INJECTION, SOLUTION INTRAMUSCULAR at 00:15

## 2017-10-13 ASSESSMENT — PAIN SCALES - GENERAL
PAINLEVEL_OUTOF10: 8
PAINLEVEL_OUTOF10: 3
PAINLEVEL_OUTOF10: 7
PAINLEVEL_OUTOF10: 6

## 2017-10-13 ASSESSMENT — ENCOUNTER SYMPTOMS
ROS GI COMMENTS: IMPROVED
SPEECH CHANGE: 0
HEADACHES: 0
NAUSEA: 0
FEVER: 0
VOMITING: 0
BLURRED VISION: 0
COUGH: 0
MYALGIAS: 0
SHORTNESS OF BREATH: 0
ABDOMINAL PAIN: 1
SENSORY CHANGE: 0

## 2017-10-13 NOTE — CARE PLAN
Problem: Infection  Goal: Will remain free from infection  IV and oral antibiotics ordered and administered.     Problem: Pain Management  Goal: Pain level will decrease to patient's comfort goal  Patient requesting oral pain medication as needed.

## 2017-10-13 NOTE — PROGRESS NOTES
"Murray Gutierrez Chavez POD 2 of [facility] with BS and left oophorectomy, extensive MAGO    Subjective: Abdominal pain. Yes but controlled with medication, ambulating .yes, tolerating liquids .yes, tolerating regular diet .yes, flatus.yes, BM yes, Bleeding .no, voiding .yes,dizziness .no, breast feeding.yes, breast tenderness .no    Blood pressure 111/53, pulse 63, temperature 36.7 °C (98 °F), resp. rate 17, height 1.575 m (5' 2\"), weight 97 kg (213 lb 13.5 oz), last menstrual period 12/09/2016, SpO2 90 %, currently breastfeeding.  Breast Exam: Tenderness .no, Engourgement .no, Mastitis .no  Abdomen soft, non-tender. BS normal. No masses,  No organomegaly  Incision: dry and intact, dressing intact  ExtremitiesNormal, no cyanosis, clubbing    Meds:   No current facility-administered medications on file prior to encounter.      Current Outpatient Prescriptions on File Prior to Encounter   Medication Sig Dispense Refill   • gabapentin (NEURONTIN) 300 MG Cap Take 300-600 mg by mouth 2 times a day as needed.         Lab:   Recent Results (from the past 48 hour(s))   CULTURE WOUND W/ GRAM STAIN    Collection Time: 10/11/17  2:00 PM   Result Value Ref Range    Significant Indicator POS (POS)     Source WND     Site Ovarian     Culture Result Wound (A)     Gram Stain Result Moderate WBCs.  Moderate Gram positive cocci.   (A)     Culture Result Wound (A)      Beta Hemolytic Streptococcus group A  Moderate growth     ANAEROBIC CULTURE    Collection Time: 10/11/17  2:00 PM   Result Value Ref Range    Significant Indicator NEG     Source WND     Site Ovarian     Anaerobic Culture, Culture Res Culture in progress.    GRAM STAIN    Collection Time: 10/11/17  2:00 PM   Result Value Ref Range    Significant Indicator .     Source WND     Site Ovarian     Gram Stain Result Moderate WBCs.  Moderate Gram positive cocci.      BLOOD CULTURE    Collection Time: 10/11/17  7:14 PM   Result Value Ref Range    Significant Indicator NEG     Source BLD  "    Site PERIPHERAL     Blood Culture       No Growth    Note: Blood cultures are incubated for 5 days and  are monitored continuously.Positive blood cultures  are called to the RN and reported as soon as  they are identified.     BLOOD CULTURE    Collection Time: 10/11/17  7:21 PM   Result Value Ref Range    Significant Indicator NEG     Source BLD     Site PERIPHERAL     Blood Culture       No Growth    Note: Blood cultures are incubated for 5 days and  are monitored continuously.Positive blood cultures  are called to the RN and reported as soon as  they are identified.     CBC WITHOUT DIFFERENTIAL    Collection Time: 10/12/17  3:15 AM   Result Value Ref Range    WBC 15.1 (H) 4.8 - 10.8 K/uL    RBC 2.80 (L) 4.20 - 5.40 M/uL    Hemoglobin 8.6 (L) 12.0 - 16.0 g/dL    Hematocrit 25.8 (L) 37.0 - 47.0 %    MCV 92.1 81.4 - 97.8 fL    MCH 30.7 27.0 - 33.0 pg    MCHC 33.3 (L) 33.6 - 35.0 g/dL    RDW 48.5 35.9 - 50.0 fL    Platelet Count 309 164 - 446 K/uL    MPV 9.7 9.0 - 12.9 fL   COMP METABOLIC PANEL    Collection Time: 10/12/17  9:36 AM   Result Value Ref Range    Sodium 145 135 - 145 mmol/L    Potassium 3.3 (L) 3.6 - 5.5 mmol/L    Chloride 114 (H) 96 - 112 mmol/L    Co2 20 20 - 33 mmol/L    Anion Gap 11.0 0.0 - 11.9    Glucose 138 (H) 65 - 99 mg/dL    Bun 7 (L) 8 - 22 mg/dL    Creatinine 1.28 0.50 - 1.40 mg/dL    Calcium 8.2 (L) 8.5 - 10.5 mg/dL    AST(SGOT) 12 12 - 45 U/L    ALT(SGPT) 8 2 - 50 U/L    Alkaline Phosphatase 87 30 - 99 U/L    Total Bilirubin 0.3 0.1 - 1.5 mg/dL    Albumin 2.6 (L) 3.2 - 4.9 g/dL    Total Protein 5.7 (L) 6.0 - 8.2 g/dL    Globulin 3.1 1.9 - 3.5 g/dL    A-G Ratio 0.8 g/dL   ESTIMATED GFR    Collection Time: 10/12/17  9:36 AM   Result Value Ref Range    GFR If  59 (A) >60 mL/min/1.73 m 2    GFR If Non African American 49 (A) >60 mL/min/1.73 m 2   CBC WITHOUT DIFFERENTIAL    Collection Time: 10/13/17  2:27 AM   Result Value Ref Range    WBC 12.5 (H) 4.8 - 10.8 K/uL    RBC 2.44  (L) 4.20 - 5.40 M/uL    Hemoglobin 7.4 (L) 12.0 - 16.0 g/dL    Hematocrit 22.7 (L) 37.0 - 47.0 %    MCV 93.0 81.4 - 97.8 fL    MCH 30.3 27.0 - 33.0 pg    MCHC 32.6 (L) 33.6 - 35.0 g/dL    RDW 49.1 35.9 - 50.0 fL    Platelet Count 335 164 - 446 K/uL    MPV 9.4 9.0 - 12.9 fL       Assessment and Plan  POD # 2  No heavy bleeding or foul vaginal discharge   Anemia  hypokalemia    Ambulate  SCDs  K-Dur  FeSO4 TID

## 2017-10-13 NOTE — PROGRESS NOTES
"Assumed care of patient. Patient stating pain is increasing. Medicated per MAR. Dressing removed per MD. Transverse to lower abdomen is approximated with staples. No redness or signs of infection noted. Patient \"sleepy\" today. No other needs at this time. Call light within reach.   "

## 2017-10-13 NOTE — CARE PLAN
Problem: Venous Thromboembolism (VTW)/Deep Vein Thrombosis (DVT) Prevention:  Goal: Patient will participate in Venous Thrombosis (VTE)/Deep Vein Thrombosis (DVT)Prevention Measures  Outcome: PROGRESSING AS EXPECTED  Pt is ambulatory. SCDs in place.     Problem: Pain Management  Goal: Pain level will decrease to patient's comfort goal  Outcome: PROGRESSING AS EXPECTED  Pain is controlled with PRN medications. Pt sleeping calmly in bed.

## 2017-10-13 NOTE — PROGRESS NOTES
Post op day #1 Progress Note    Name:   Murray Chavez   Date/Time:  10/12/2017 - 5:16 PM  Chief Admitting Dx:  Sepsis (CMS-HCC), Tubal ovarian abscess  Post-Op/Post Partum Days #:  1, hospital day #3    Subjective:  Abdominal pain: yes  Ambulating:   no  Tolerating liquids:  yes  Tolerating food:  No  Flatus:   no  BM:    no  Bleeding:   with a small amount of bleeding  Voiding:   no  Dizziness:   no  Feeding:   Breast - pumping    Vitals:    10/11/17 2023 10/11/17 2300 10/12/17 0445 10/12/17 0825   BP: 128/76 113/73 104/59 111/68   Pulse: (!) 101 92 78 86   Resp: 18 18 18 18   Temp: 37.4 °C (99.4 °F) 37.1 °C (98.7 °F) 36.8 °C (98.2 °F) 36.8 °C (98.2 °F)   TempSrc:       SpO2: 96% 95% 94% 95%   Weight:       Height:           Exam:  Breast: Lactating yes  Abdomen: Abdomen soft, BS normal. No masses,  No organomegaly, appropriately tender  Incision: dry and intact  Extremities: Normal extremities, peripheral pulses and reflexes normal, no edema, redness or tenderness in the calves or thighs    Meds:  Current Facility-Administered Medications   Medication Dose   • clindamycin (CLEOCIN) capsule 300 mg  300 mg   • ketorolac (TORADOL) injection 30 mg  30 mg   • ondansetron (ZOFRAN) syringe/vial injection 4 mg  4 mg   • simethicone (MYLICON) chewable tab 80 mg  80 mg   • HYDROmorphone (DILAUDID) injection 0.5-1 mg  0.5-1 mg   • oxycodone immediate-release (ROXICODONE) tablet 5 mg  5 mg    Or   • oxycodone immediate release (ROXICODONE) tablet 10 mg  10 mg   • cefTRIAXone (ROCEPHIN) 2 g in 50 mL D5W IVPB premix  2 g   • acetaminophen (TYLENOL) tablet 975 mg  975 mg   • ondansetron (ZOFRAN) syringe/vial injection 4 mg  4 mg   • D5 NS infusion     • docusate sodium (COLACE) capsule 100 mg  100 mg   • ibuprofen (MOTRIN) tablet 800 mg  800 mg   • gabapentin (NEURONTIN) capsule 600 mg  600 mg   • prenatal plus vitamin (STUARTNATAL 1+1) 27-1 MG tablet 1 Tab  1 Tab   • hydrOXYzine (ATARAX) tablet 50 mg  50 mg       Labs:    Recent Labs      10/10/17   1515  10/10/17   2356  10/12/17   0315   WBC  12.8*  11.9*  15.1*   RBC  3.38*  3.48*  2.80*   HEMOGLOBIN  10.3*  10.4*  8.6*   HEMATOCRIT  30.7*  32.1*  25.8*   MCV  90.8  92.2  92.1   MCH  30.5  29.9  30.7   MCHC  33.6  32.4*  33.3*   RDW  45.1  47.5  48.5   PLATELETCT  211  231  309   MPV  9.9  9.8  9.7       Assessment:  Chief Admitting Dx:  Sepsis (CMS-HCC), Tubal ovarian abscess  + Group A strep blood culture  POD # 1 S/P TULIO, BS, Left oophorectomy    Plan:  Continue Rocephin and Clindamycin  Routine post op care - d/c walsh, advance diet, ambulate  Breast feeding - pump and dump for now.       Barbra Ulloa M.D.

## 2017-10-13 NOTE — PROGRESS NOTES
"Received report from day shift RN.  Pt A&O x 4.   Pain reported at 3/10. Pt states \"pain is tolerable right now\".  Pt denies nausea, vomiting, chest pain, shortness of breath at this time.  Lower abd incision with dressing, CDI.  +BM. +void.   Call light within reach. Bed locked and in lowest position.   All needs are met at this time. Plan of care discussed with pt and family.     Pt educated regarding fall risk and intervention. Pt verbalized understanding and still refusing bed alarm. Pt A&O x4. Pt demonstrates appropriate use of call light to call for assistance. Hourly rounding in place.    "

## 2017-10-13 NOTE — PROGRESS NOTES
PICC Insertion Procedure Note    Consents confirmed, vessel patency confirmed with ultrasound. Risks and benefits of procedure explained to patient/family and education regarding central line associated bloodstream infections provided. Questions answered.     PICC placed in RUE per MD order with ultrasound guidance. 4 Fr, single lumen PICC placed in basilic vein after single attempt(s). 3 cc's of 1% lidocaine injected intradermally, 21 gauge microintroducer needle and modified Seldinger technique used. 43 cm total catheter length, 3 cm external measurement inserted with good blood return. Each lumen flushed without resistance with 10 mL 0.9% normal saline. PICC line secured with Biopatch, statlock, and Tegaderm.    PICC tip location in the SVC confirmed by ECG technology. Pt tolerated procedure well.  Patient condition relayed to unit RN or ordering physician via this post procedure note in the EMR.     PVC [employer] Power PICC ref # [id number], Lot # [id number]

## 2017-10-14 LAB
BACTERIA SPEC ANAEROBE CULT: NORMAL
ERYTHROCYTE [DISTWIDTH] IN BLOOD BY AUTOMATED COUNT: 48.7 FL (ref 35.9–50)
HCT VFR BLD AUTO: 22.8 % (ref 37–47)
HGB BLD-MCNC: 7.4 G/DL (ref 12–16)
MCH RBC QN AUTO: 29.7 PG (ref 27–33)
MCHC RBC AUTO-ENTMCNC: 32.5 G/DL (ref 33.6–35)
MCV RBC AUTO: 91.6 FL (ref 81.4–97.8)
PLATELET # BLD AUTO: 374 K/UL (ref 164–446)
PMV BLD AUTO: 9.1 FL (ref 9–12.9)
RBC # BLD AUTO: 2.49 M/UL (ref 4.2–5.4)
SIGNIFICANT IND 70042: NORMAL
SITE SITE: NORMAL
SOURCE SOURCE: NORMAL
WBC # BLD AUTO: 11.9 K/UL (ref 4.8–10.8)

## 2017-10-14 PROCEDURE — 770001 HCHG ROOM/CARE - MED/SURG/GYN PRIV*

## 2017-10-14 PROCEDURE — 700112 HCHG RX REV CODE 229: Performed by: OBSTETRICS & GYNECOLOGY

## 2017-10-14 PROCEDURE — 700102 HCHG RX REV CODE 250 W/ 637 OVERRIDE(OP): Performed by: OBSTETRICS & GYNECOLOGY

## 2017-10-14 PROCEDURE — 700111 HCHG RX REV CODE 636 W/ 250 OVERRIDE (IP): Performed by: OBSTETRICS & GYNECOLOGY

## 2017-10-14 PROCEDURE — 700105 HCHG RX REV CODE 258: Performed by: OBSTETRICS & GYNECOLOGY

## 2017-10-14 PROCEDURE — A9270 NON-COVERED ITEM OR SERVICE: HCPCS | Performed by: OBSTETRICS & GYNECOLOGY

## 2017-10-14 PROCEDURE — 700111 HCHG RX REV CODE 636 W/ 250 OVERRIDE (IP): Performed by: INTERNAL MEDICINE

## 2017-10-14 PROCEDURE — 700102 HCHG RX REV CODE 250 W/ 637 OVERRIDE(OP): Performed by: INTERNAL MEDICINE

## 2017-10-14 PROCEDURE — 85027 COMPLETE CBC AUTOMATED: CPT

## 2017-10-14 PROCEDURE — A9270 NON-COVERED ITEM OR SERVICE: HCPCS | Performed by: INTERNAL MEDICINE

## 2017-10-14 RX ORDER — OXYCODONE AND ACETAMINOPHEN 10; 325 MG/1; MG/1
1 TABLET ORAL EVERY 4 HOURS PRN
Qty: 40 TAB | Refills: 0 | Status: SHIPPED | OUTPATIENT
Start: 2017-10-14

## 2017-10-14 RX ORDER — IBUPROFEN 800 MG/1
800 TABLET ORAL EVERY 8 HOURS PRN
Qty: 60 TAB | Refills: 1 | Status: SHIPPED | OUTPATIENT
Start: 2017-10-14

## 2017-10-14 RX ORDER — FERROUS SULFATE 325(65) MG
325 TABLET ORAL 2 TIMES DAILY
Qty: 60 TAB | Refills: 0 | Status: SHIPPED | OUTPATIENT
Start: 2017-10-14

## 2017-10-14 RX ORDER — AMOXICILLIN AND CLAVULANATE POTASSIUM 875; 125 MG/1; MG/1
1 TABLET, FILM COATED ORAL 2 TIMES DAILY
Qty: 14 TAB | Refills: 0 | Status: SHIPPED | OUTPATIENT
Start: 2017-10-14

## 2017-10-14 RX ORDER — CLINDAMYCIN HYDROCHLORIDE 300 MG/1
300 CAPSULE ORAL 4 TIMES DAILY
Qty: 28 CAP | Refills: 0 | Status: SHIPPED | OUTPATIENT
Start: 2017-10-14

## 2017-10-14 RX ADMIN — POTASSIUM CHLORIDE 20 MEQ: 1500 TABLET, EXTENDED RELEASE ORAL at 08:51

## 2017-10-14 RX ADMIN — OXYCODONE HYDROCHLORIDE 5 MG: 5 TABLET ORAL at 09:04

## 2017-10-14 RX ADMIN — KETOROLAC TROMETHAMINE 30 MG: 30 INJECTION, SOLUTION INTRAMUSCULAR at 12:03

## 2017-10-14 RX ADMIN — CLINDAMYCIN HYDROCHLORIDE 300 MG: 150 CAPSULE ORAL at 21:27

## 2017-10-14 RX ADMIN — GABAPENTIN 600 MG: 300 CAPSULE ORAL at 08:51

## 2017-10-14 RX ADMIN — CEFTRIAXONE 2 G: 2 INJECTION, SOLUTION INTRAVENOUS at 08:50

## 2017-10-14 RX ADMIN — Medication 325 MG: at 16:38

## 2017-10-14 RX ADMIN — GABAPENTIN 600 MG: 300 CAPSULE ORAL at 21:27

## 2017-10-14 RX ADMIN — Medication 325 MG: at 12:03

## 2017-10-14 RX ADMIN — CLINDAMYCIN HYDROCHLORIDE 300 MG: 150 CAPSULE ORAL at 12:43

## 2017-10-14 RX ADMIN — CLINDAMYCIN HYDROCHLORIDE 300 MG: 150 CAPSULE ORAL at 08:51

## 2017-10-14 RX ADMIN — CLINDAMYCIN HYDROCHLORIDE 300 MG: 150 CAPSULE ORAL at 16:38

## 2017-10-14 RX ADMIN — Medication 1 TABLET: at 08:51

## 2017-10-14 RX ADMIN — ONDANSETRON 4 MG: 2 INJECTION, SOLUTION INTRAMUSCULAR; INTRAVENOUS at 23:35

## 2017-10-14 RX ADMIN — DEXTROSE AND SODIUM CHLORIDE: 5; 900 INJECTION, SOLUTION INTRAVENOUS at 02:49

## 2017-10-14 RX ADMIN — OXYCODONE HYDROCHLORIDE 5 MG: 5 TABLET ORAL at 23:35

## 2017-10-14 RX ADMIN — Medication 325 MG: at 08:51

## 2017-10-14 RX ADMIN — KETOROLAC TROMETHAMINE 30 MG: 30 INJECTION, SOLUTION INTRAMUSCULAR at 05:07

## 2017-10-14 RX ADMIN — DOCUSATE SODIUM 100 MG: 100 CAPSULE ORAL at 08:51

## 2017-10-14 RX ADMIN — OXYCODONE HYDROCHLORIDE 5 MG: 5 TABLET ORAL at 17:57

## 2017-10-14 RX ADMIN — DEXTROSE AND SODIUM CHLORIDE: 5; 900 INJECTION, SOLUTION INTRAVENOUS at 12:03

## 2017-10-14 RX ADMIN — DEXTROSE AND SODIUM CHLORIDE: 5; 900 INJECTION, SOLUTION INTRAVENOUS at 17:57

## 2017-10-14 ASSESSMENT — PAIN SCALES - GENERAL
PAINLEVEL_OUTOF10: 2
PAINLEVEL_OUTOF10: 4
PAINLEVEL_OUTOF10: 5
PAINLEVEL_OUTOF10: 8
PAINLEVEL_OUTOF10: 6

## 2017-10-14 NOTE — DISCHARGE SUMMARY
Discharge Summary:      Murray Chavez      Admit Date:   10/8/2017  Discharge Date:  10/14/2017     Admitting diagnosis:  Sepsis (CMS-HCC)  Abdominal pain  Discharge Diagnosis: Status post TULIO, Bilateral salpingectomy, Left oophorectomy, lysis of adhesions for TOA, sepsis, Group A Strep bacteremia     History:  Past Medical History:   Diagnosis Date   • HPV in female     DX    • Migraine    • Urinary tract infection, site not specified     bladder infections     OB History    Para Term  AB Living   3 3 3     3   SAB TAB Ectopic Molar Multiple Live Births             3      # Outcome Date GA Lbr Severo/2nd Weight Sex Delivery Anes PTL Lv   3 Term 17 39w5d  3.39 kg (7 lb 7.6 oz) F CS-Unspec Spinal N SHAMIR   2 Term 11 40w0d  3.714 kg (8 lb 3 oz) M Vag-Spont None N SHAMIR   1 Term 10/06/07 40w0d  2.892 kg (6 lb 6 oz) F Vag-Spont EPI N SHAMIR           Review of patient's allergies indicates no known allergies.  Patient Active Problem List    Diagnosis Date Noted   • Sepsis (CMS-[facility]) 10/09/2017   • Abdominal pain 10/09/2017   • Migraines 2017        Hospital Course:   31 y.o.  was admitted with the above mentioned diagnosis, underwent admission for IV antibiotics due to sepsis and TOA.  She continued with fevers despite 48 hours of IV antibiotics.  Blood cultures returned + for Group A strep. . Patient was taken to the OR for TULIO, BS, LO, and extensive lysis of adhesions.  She remained on IV antibiotics post surgery and remained afebrile after post op day #1.  Post operative course was unremarkable, with progressive advancement in diet , ambulation and toleration of oral analgesia. Patient without complaints today and desires discharge.      Vitals:    10/13/17 1620 10/13/17 2000 10/14/17 0400 10/14/17 0857   BP: 118/66 125/70 127/61 122/75   Pulse: 67 67 71 (!) 56   Resp: 18 20 20 18   Temp: 36.3 °C (97.3 °F) 36.9 °C (98.4 °F) 37 °C (98.6 °F) 37.2 °C (99 °F)   TempSrc:        SpO2: 97% 97% 93% 94%   Weight:       Height:           Current Facility-Administered Medications   Medication Dose   • potassium chloride SA (Kdur) tablet 20 mEq  20 mEq   • ferrous sulfate tablet 325 mg  325 mg   • clindamycin (CLEOCIN) capsule 300 mg  300 mg   • ketorolac (TORADOL) injection 30 mg  30 mg   • ondansetron (ZOFRAN) syringe/vial injection 4 mg  4 mg   • simethicone (MYLICON) chewable tab 80 mg  80 mg   • HYDROmorphone (DILAUDID) injection 0.5-1 mg  0.5-1 mg   • oxycodone immediate-release (ROXICODONE) tablet 5 mg  5 mg    Or   • oxycodone immediate release (ROXICODONE) tablet 10 mg  10 mg   • cefTRIAXone (ROCEPHIN) 2 g in 50 mL D5W IVPB premix  2 g   • acetaminophen (TYLENOL) tablet 975 mg  975 mg   • ondansetron (ZOFRAN) syringe/vial injection 4 mg  4 mg   • D5 NS infusion     • docusate sodium (COLACE) capsule 100 mg  100 mg   • ibuprofen (MOTRIN) tablet 800 mg  800 mg   • gabapentin (NEURONTIN) capsule 600 mg  600 mg   • prenatal plus vitamin (STUARTNATAL 1+1) 27-1 MG tablet 1 Tab  1 Tab   • hydrOXYzine (ATARAX) tablet 50 mg  50 mg       Exam:  Breast Exam: negative  Abdomen: Abdomen soft, appropriately tender,  BS normal. No masses,  No organomegaly  Incision: dry and intact, healing well.   Extremity: extremities, peripheral pulses and reflexes normal, pedal edema 2 + bilaterally, no calf tenderness.      Labs:  Recent Labs      10/12/17   0315  10/13/17   0227  10/14/17   0246   WBC  15.1*  12.5*  11.9*   RBC  2.80*  2.44*  2.49*   HEMOGLOBIN  8.6*  7.4*  7.4*   HEMATOCRIT  25.8*  22.7*  22.8*   MCV  92.1  93.0  91.6   MCH  30.7  30.3  29.7   MCHC  33.3*  32.6*  32.5*   RDW  48.5  49.1  48.7   PLATELETCT  309  335  374   MPV  9.7  9.4  9.1        Activity:   Discharge to home  Pelvic Rest x 6 weeks    Assessment:  POD #3 S/P TULIO, BS, LO, lysis of adhesions for TOA, complicated by Group A strep bacteremia.    Plan per ID is to continue IV Rocephin via PICC line for two weeks.  Scripts given  for Clindamycin and Augmentin as recommended by ID.   Tolerating anemia well - Feso4 325 mg po bid and stool softeners prn.  Breast feeding.  Recommend continuing to dump milk.     Follow up: .1 week with Dr. Ulloa.  Pt instructed to call office or go to ER for T>101, severe pelvic pain, intractable nausea, foul vaginal discharge, incision redness or drainage, or any concerns.      Discharge Meds:   Current Outpatient Prescriptions   Medication Sig Dispense Refill   • ibuprofen (MOTRIN) 800 MG Tab Take 1 Tab by mouth every 8 hours as needed for Moderate Pain. 60 Tab 1   • ferrous sulfate 325 (65 Fe) MG tablet Take 1 Tab by mouth 2 Times a Day. 60 Tab 0   • clindamycin (CLEOCIN) 300 MG Cap Take 1 Cap by mouth 4 times a day. 28 Cap 0   • amoxicillin-clavulanate (AUGMENTIN) 875-125 MG Tab Take 1 Tab by mouth 2 times a day. Start taking Augment after Clindamycin is completed. 14 Tab 0   • oxycodone-acetaminophen (PERCOCET-10)  MG Tab Take 1 Tab by mouth every four hours as needed for Severe Pain. 40 Tab 0       Barbra Ulloa M.D.

## 2017-10-14 NOTE — PROGRESS NOTES
Infectious Disease Progress Note    Author: Esther Patterson M.D. Date & Time of service: 10/13/2017  5:29 PM    Chief Complaint:  Sepsis    Interval History:  Labs Reviewed, Medications Reviewed, Radiology Reviewed and Wound Reviewed.  31-year-old female with recent  admitted with sepsis and abdominal pain  10/10/17-MAXIMUM TEMPERATURE 99.3. Patient very anxious and crying. Complains of diffuse sweating  10/12/17-MAXIMUM TEMPERATURE 100.1. Says she feels much better. WBC 15  10/13/17-MAXIMUM TEMPERATURE 98.4. WBC 12.5 got her PICC line  Review of Systems:  Review of Systems   Constitutional: Positive for malaise/fatigue. Negative for fever.   Eyes: Negative for blurred vision.   Respiratory: Negative for cough and shortness of breath.    Cardiovascular: Negative for chest pain and leg swelling.   Gastrointestinal: Positive for abdominal pain. Negative for nausea and vomiting.        Improved   Genitourinary: Negative for dysuria.   Musculoskeletal: Negative for myalgias.   Skin: Negative for rash.   Neurological: Negative for sensory change, speech change and headaches.       Hemodynamics:  Temp (24hrs), Av.8 °C (98.3 °F), Min:36.7 °C (98 °F), Max:36.9 °C (98.4 °F)  Temperature: 36.9 °C (98.4 °F)  Pulse  Av  Min: 63  Max: 145  Blood Pressure: 109/71       Physical Exam:  Physical Exam   Constitutional: She is oriented to person, place, and time. No distress.   HENT:   Mouth/Throat: No oropharyngeal exudate.   Eyes: No scleral icterus.   Neck: Neck supple.   Cardiovascular: Regular rhythm.    No murmur heard.  Pulmonary/Chest: She has no wheezes. She has no rales.   Abdominal: Soft. There is tenderness. There is no guarding.   Surgical bandage   Musculoskeletal:   PICC line rt upper extremity   Neurological: She is alert and oriented to person, place, and time.   Psychiatric: She has a normal mood and affect.   Vitals reviewed.      Meds:    Current Facility-Administered Medications:   •  potassium  chloride SA  •  ferrous sulfate  •  clindamycin  •  ketorolac  •  ondansetron  •  simethicone  •  hydromorphone  •  oxycodone immediate-release **OR** oxycodone immediate-release  •  cefTRIAXone (ROCEPHIN) IVPB  •  acetaminophen  •  ondansetron  •  D5 NS  •  docusate sodium  •  ibuprofen  •  gabapentin  •  prenatal plus vitamin  •  hydrOXYzine HCl    Labs:  Recent Labs      10/10/17   2356  10/12/17   0315  10/13/17   0227   WBC  11.9*  15.1*  12.5*   RBC  3.48*  2.80*  2.44*   HEMOGLOBIN  10.4*  8.6*  7.4*   HEMATOCRIT  32.1*  25.8*  22.7*   MCV  92.2  92.1  93.0   MCH  29.9  30.7  30.3   RDW  47.5  48.5  49.1   PLATELETCT  231  309  335   MPV  9.8  9.7  9.4   NEUTSPOLYS  69.50   --    --    LYMPHOCYTES  14.50*   --    --    MONOCYTES  10.90   --    --    EOSINOPHILS  1.90   --    --    BASOPHILS  0.80   --    --      Recent Labs      10/10/17   2356  10/12/17   0936   SODIUM  141  145   POTASSIUM  3.4*  3.3*   CHLORIDE  112  114*   CO2  19*  20   GLUCOSE  90  138*   BUN  6*  7*     Recent Labs      10/10/17   2356  10/12/17   0936   ALBUMIN  3.1*  2.6*   TBILIRUBIN  0.5  0.3   ALKPHOSPHAT  92  87   TOTPROTEIN  6.5  5.7*   ALTSGPT  8  8   ASTSGOT  12  12   CREATININE  1.37  1.28       Imaging:  Ct-abdomen-pelvis With    Result Date: 10/8/2017  10/8/2017 9:57 PM HISTORY/REASON FOR EXAM:  Lower abdominal pain, fever, chills.  section  TECHNIQUE/EXAM DESCRIPTION:  CT scan of the abdomen and pelvis with contrast. Contrast-enhanced helical scanning was obtained from the diaphragmatic domes through the pubic symphysis following the bolus administration of nonionic contrast without complication. 100 mL of Omnipaque 350 nonionic contrast was administered without complication. Low dose optimization technique was utilized for this CT exam including automated exposure control and adjustment of the mA and/or kV according to patient size. COMPARISON: Pelvic ultrasound 2017 FINDINGS: Lung bases: The lung  bases are clear. Osseous structures:  Within normal limits. Abdomen: Uterus is enlarged consistent with being recently postpartum. There is fluid anterior and to the left of the uterus. No loculated collection is identified. Density is approximately 53 Hounsfield unit consistent with hemorrhage. The ovaries are not effectively identified with CT. The liver shows decrease in density that is consistent with fatty infiltration. There is no biliary dilation. The spleen is normal in appearance. The pancreas is normal in appearance. The splenic vein and portal vein enhance normally. Both adrenal glands are normal in appearance. The right kidney is normal in appearance. The left kidney is normal in appearance. There is no hydronephrosis. Bowel and mesentery: Within normal limits. The aorta is normal in appearance. The terminal ileum is normal in appearance. The appendix is identified.     1.  Small amount of hemorrhage anterior/left of the uterus 2.  No abscess 3.  Fatty infiltration of the liver    Dx-chest-portable (1 View)    Result Date: 10/8/2017  10/8/2017 9:20 PM HISTORY/REASON FOR EXAM:  Weakness and fever. TECHNIQUE/EXAM DESCRIPTION AND NUMBER OF VIEWS: Single portable view of the chest. COMPARISON: None FINDINGS: The lungs are clear. Cardiac silhouette: normal in size. Pleura: There are no pleural effusion or pneumothoraces. Osseous structures: No significant bony abnormality is present.     Negative single view of the chest.    Us-gyn-pelvis Transvaginal    Result Date: 10/9/2017  10/9/2017 9:21 AM HISTORY/REASON FOR EXAM:  Abdominal pain. Postpartum x5 weeks following . TECHNIQUE/EXAM DESCRIPTION: Transvaginal pelvic ultrasound. COMPARISON:   Correlation is made with CT abdomen and pelvis dated 10/8/2017. FINDINGS: Both transabdominal and transvaginal scanning was performed to optimally visualize the pelvis. The uterus measures 5.06 cm x 10.50 cm x 5.77 cm. The uterine myometrium is heterogeneous. The  endometrial echo complex measures 0.36 cm. There is trace fluid in the endometrial canal.. Low resistive waveforms are confirmed within the ovaries. The right ovary measures 2.89 cm x 3.50 cm x 2.25 cm. The left adnexal region measures 5.04 cm x 5.44 cm x 7.91 cm. There is a heterogeneous 6.8 x 2.6 cm region in or adjacent to the left ovary with internal flow. There is trace free fluid adjacent to the left adnexal structures. There is trace free fluid in the pelvic cul-de-sac.     1.  Heterogeneous region measuring 6.8 x 2.6 cm in or adjacent to the left ovary with internal flow. This is of uncertain etiology and significance. Given internal flow, finding does not definitely appear consistent with hemorrhage. 2.  Trace fluid in the endometrial canal. 3.  There is minimal trace fluid in the left adnexal region.      Micro:  Results     Procedure Component Value Units Date/Time    ANAEROBIC CULTURE [[id number]] Collected:  10/11/17 1400    Order Status:  Completed Specimen:  Wound Updated:  10/13/17 1106     Significant Indicator NEG     Source WND     Site Ovarian     Anaerobic Culture, Culture Res Culture in progress.    Narrative:       CALL  Allen  141 tel. [phone],  CALLED  141 tel. [phone] 10/12/2017, 13:50, RB PERF. RESULTS CALLED  TO:[phone](GRPA)    CULTURE WOUND W/ GRAM STAIN [[id number]]  (Abnormal) Collected:  10/11/17 1400    Order Status:  Completed Specimen:  Wound Updated:  10/13/17 1106     Gram Stain Result --     Moderate WBCs.  Moderate Gram positive cocci.       Significant Indicator POS (POS)     Source WND     Site Ovarian     Culture Result Wound -- (A)     Culture Result Wound -- (A)     Beta Hemolytic Streptococcus group A  Moderate growth      Narrative:       CALL  Allen  141 tel. [phone],  CALLED  141 tel. [phone] 10/12/2017, 13:50, RB PERF. RESULTS CALLED  TO:[phone](GRPA)    BLOOD CULTURE [[id number]] Collected:  10/11/17 1921    Order Status:  Completed Specimen:  Blood from  "Peripheral Updated:  10/12/17 0731     Significant Indicator NEG     Source BLD     Site PERIPHERAL     Blood Culture --     No Growth    Note: Blood cultures are incubated for 5 days and  are monitored continuously.Positive blood cultures  are called to the RN and reported as soon as  they are identified.      Narrative:       Per Hospital Policy: Only change Specimen Src: to \"Line\" if  specified by physician order.    BLOOD CULTURE [[id number]] Collected:  10/11/17 1914    Order Status:  Completed Specimen:  Blood from Peripheral Updated:  10/12/17 0731     Significant Indicator NEG     Source BLD     Site PERIPHERAL     Blood Culture --     No Growth    Note: Blood cultures are incubated for 5 days and  are monitored continuously.Positive blood cultures  are called to the RN and reported as soon as  they are identified.      Narrative:       Per Hospital Policy: Only change Specimen Src: to \"Line\" if  specified by physician order.    GRAM STAIN [[id number]] Collected:  10/11/17 1400    Order Status:  Completed Specimen:  Wound Updated:  10/11/17 2141     Significant Indicator .     Source WND     Site Ovarian     Gram Stain Result --     Moderate WBCs.  Moderate Gram positive cocci.      SENSITIVITY, E-TEST [[id number]] Collected:  10/08/17 2005    Order Status:  Completed Specimen:  Blood Updated:  10/11/17 0842     ETEST Sensitivity FINAL    Narrative:       141 tel. [phone] 10/10/2017, 08:25, RB PERF. RESULTS CALLED TO:Alondra  [phone] w/Grp A  Per Hospital Policy: Only change Specimen Src: to \"Line\" if  specified by physician order.    BLOOD CULTURE [[id number]]  (Abnormal)  (Susceptibility) Collected:  10/08/17 2005    Order Status:  Completed Specimen:  Blood from Peripheral Updated:  10/11/17 0842     Significant Indicator POS (POS)     Source BLD     Site PERIPHERAL     Blood Culture Growth detected by Bactec instrument.  10/09/2017  09:27 (A)     Blood Culture Beta Hemolytic Streptococcus group A (A)    " "Narrative:       CALL  Allen  141 tel. [phone],  CALLED  141 tel. [phone] 10/10/2017, 08:25, RB PERF. RESULTS CALLED  TO:Alondra [phone] w/Grp A  Per Hospital Policy: Only change Specimen Src: to \"Line\" if  specified by physician order.    Culture & Susceptibility     BETA HEMOLYTIC STREPTOCOCCUS GROUP A     Antibiotic Sensitivity Microscan Unit Status    Cefotaxime Sensitive 0.023 mcg/mL Final    Penicillin Sensitive 0.016 mcg/mL Final                       URINE CULTURE(NEW) [[id number]]  (Abnormal) Collected:  10/08/17 2343    Order Status:  Completed Specimen:  Urine Updated:  10/11/17 0812     Significant Indicator POS (POS)     Source UR     Site --     Urine Culture Mixed skin chrissie 10-50,000 cfu/mL (A)     Urine Culture -- (A)     Streptococcus agalactiae (Group B)  <10,000 cfu/mL      Narrative:       CALL  Allen  141 tel. [phone],  CALLED  141 tel. [phone] 10/11/2017, 08:12, CALL CANCELLED - RESULTS FAXED  Indication for culture:->Emergency Room Patient    BLOOD CULTURE [[id number]] Collected:  10/08/17 2155    Order Status:  Completed Specimen:  Blood from Peripheral Updated:  10/09/17 0710     Significant Indicator NEG     Source BLD     Site PERIPHERAL     Blood Culture --     No Growth    Note: Blood cultures are incubated for 5 days and  are monitored continuously.Positive blood cultures  are called to the RN and reported as soon as  they are identified.      Narrative:       Per Hospital Policy: Only change Specimen Src: to \"Line\" if  specified by physician order.    INFLUENZA RAPID [[id number]] Collected:  10/09/17 0047    Order Status:  Completed Specimen:  Respirate from Respiratory Updated:  10/09/17 0136     Significant Indicator NEG     Source RESP     Site RESPIRATORY     Rapid Influenza A-B --     Negative for Influenza A and Influenza B antigens.  Infection due to influenza A or B cannot be ruled out  since the antigen present in the specimen may be below the  detection limit of the " test. Culture confirmation of  negative samples is recommended.      URINALYSIS [[id number]]  (Abnormal) Collected:  10/08/17 2343    Order Status:  Completed Specimen:  Urine Updated:  10/09/17 0011     Color Yellow     Character Clear     Specific Gravity 1.014     Ph 5.5     Glucose Negative mg/dL      Ketones Negative mg/dL      Protein Negative mg/dL      Bilirubin Negative     Urobilinogen, Urine 0.2     Nitrite Negative     Leukocyte Esterase Small (A)     Occult Blood Small (A)     Micro Urine Req Microscopic    Narrative:       Indication for culture:->Emergency Room Patient    URINALYSIS [[id number]]     Order Status:  Canceled Specimen:  Urine     URINE CULTURE(NEW) [[id number]] Collected:  10/08/17 0000    Order Status:  Canceled Specimen:  Urine     BLOOD CULTURE [[id number]] Collected:  10/08/17 0000    Order Status:  Canceled Specimen:  Other from Peripheral     BLOOD CULTURE [[id number]] Collected:  10/08/17 0000    Order Status:  Canceled Specimen:  Other from Peripheral           Assessment:  Active Hospital Problems    Diagnosis   • Sepsis (CMS-[facility]) [A41.9]   • Abdominal pain [R10.9]       Plan:  Group A strep sepsis  Blood cultures are positive from 10/8/17  On clindamycin and Rocephin  Patient is already discarding her breast milk. Advised strictly not to breast-feed the baby while on clindamycin.  Change clindamycin to oral. Aim through 10/20/17  Repeat blood cultures are negative from 10/11/17  Continue Rocephin through 10/25/17  Orders written and given to the nursing   Follow-up in the infectious disease office  Follow these antibiotics with by mouth Augmentin for one week    Tubo-ovarian abscess  Underwent total abdominal hysterectomy, bilateral salpingectomy and left nephrectomy with extensive lysis of adhesions on 10/11/17  Wound cultures are group A strep as well    Leukocytosis  Improved  12 at last check  Discussed with OB/GYN

## 2017-10-14 NOTE — PROGRESS NOTES
Received change of shift report  Reviewed morning labs: H/H 7.4/22.8, WBC 11.9  , 2 young children,  at bedside  Assessment done  A+O x4  RA  Pt states pain 4/10- medicated per MAR  R PICC CDI, flushes easily, blood return present, D5NS running at 125 ml/hr  Abd incision CDI, no drainage noted, well approximated with staples, AMARILIS  Active BS x4  Bed low and locked, call light and belongings in reach, hourly rounding in place  Discussed POC with MD, pt, and family.  All needs met at this time

## 2017-10-14 NOTE — CARE PLAN
Problem: Infection  Goal: Will remain free from infection  Outcome: PROGRESSING AS EXPECTED  PICC assessed before each medication, hands washed, gloves worn, hub scrubbed, IV and PO abx- pt will continue outpatient abx after D/C    Problem: Pain Management  Goal: Pain level will decrease to patient's comfort goal  Outcome: PROGRESSING AS EXPECTED  Pain at a tolerable level with pain medication- reassessed Q2 hours and medicated per MAR

## 2017-10-14 NOTE — PROGRESS NOTES
Received report from day shift RN.   Pt A&O x 4.   Pt's family at bedside.   Pain reported at 8/10. Medicated per MAR.   Pt denies nausea, vomiting, chest pain, shortness of breath at this time.   Incision stapled, AMARILIS, no infection sign noted.  BLE +1 edema noted.   Right PICC in place with dressing, CDI.   +BM. +void.  Call light within reach. Bed locked and in lowest position.  Plan of care discussed with pt. All needs are met at this time.    Pt educated regarding fall risk and intervention. Pt verbalized understanding and still refusing bed alarm. Pt A&O x4. Pt demonstrates appropriate use of call light to call for assistance. Hourly rounding in place.

## 2017-10-14 NOTE — DISCHARGE PLANNING
Pt will need IV ABX infusions. Per Infusion center, need to call scheduling and have them get a prior auth on Monday. Pt has [facility] and they are not open on weekends. We will need a copy of the IV ABX RX. Bedside RN made aware.

## 2017-10-15 LAB
ERYTHROCYTE [DISTWIDTH] IN BLOOD BY AUTOMATED COUNT: 47.8 FL (ref 35.9–50)
HCT VFR BLD AUTO: 22.3 % (ref 37–47)
HGB BLD-MCNC: 7.6 G/DL (ref 12–16)
MCH RBC QN AUTO: 31.3 PG (ref 27–33)
MCHC RBC AUTO-ENTMCNC: 34.1 G/DL (ref 33.6–35)
MCV RBC AUTO: 91.8 FL (ref 81.4–97.8)
PLATELET # BLD AUTO: 385 K/UL (ref 164–446)
PMV BLD AUTO: 9.3 FL (ref 9–12.9)
RBC # BLD AUTO: 2.43 M/UL (ref 4.2–5.4)
WBC # BLD AUTO: 11.4 K/UL (ref 4.8–10.8)

## 2017-10-15 PROCEDURE — A9270 NON-COVERED ITEM OR SERVICE: HCPCS | Performed by: OBSTETRICS & GYNECOLOGY

## 2017-10-15 PROCEDURE — 700102 HCHG RX REV CODE 250 W/ 637 OVERRIDE(OP): Performed by: OBSTETRICS & GYNECOLOGY

## 2017-10-15 PROCEDURE — 770001 HCHG ROOM/CARE - MED/SURG/GYN PRIV*

## 2017-10-15 PROCEDURE — 700105 HCHG RX REV CODE 258: Performed by: OBSTETRICS & GYNECOLOGY

## 2017-10-15 PROCEDURE — 700102 HCHG RX REV CODE 250 W/ 637 OVERRIDE(OP): Performed by: INTERNAL MEDICINE

## 2017-10-15 PROCEDURE — 700112 HCHG RX REV CODE 229: Performed by: OBSTETRICS & GYNECOLOGY

## 2017-10-15 PROCEDURE — 85027 COMPLETE CBC AUTOMATED: CPT

## 2017-10-15 PROCEDURE — A9270 NON-COVERED ITEM OR SERVICE: HCPCS | Performed by: INTERNAL MEDICINE

## 2017-10-15 PROCEDURE — 700111 HCHG RX REV CODE 636 W/ 250 OVERRIDE (IP): Performed by: INTERNAL MEDICINE

## 2017-10-15 RX ADMIN — Medication 325 MG: at 09:21

## 2017-10-15 RX ADMIN — GABAPENTIN 600 MG: 300 CAPSULE ORAL at 21:30

## 2017-10-15 RX ADMIN — CLINDAMYCIN HYDROCHLORIDE 300 MG: 150 CAPSULE ORAL at 12:42

## 2017-10-15 RX ADMIN — POTASSIUM CHLORIDE 20 MEQ: 1500 TABLET, EXTENDED RELEASE ORAL at 09:21

## 2017-10-15 RX ADMIN — CEFTRIAXONE 2 G: 2 INJECTION, SOLUTION INTRAVENOUS at 09:21

## 2017-10-15 RX ADMIN — CLINDAMYCIN HYDROCHLORIDE 300 MG: 150 CAPSULE ORAL at 17:05

## 2017-10-15 RX ADMIN — DOCUSATE SODIUM 100 MG: 100 CAPSULE ORAL at 21:31

## 2017-10-15 RX ADMIN — OXYCODONE HYDROCHLORIDE 5 MG: 5 TABLET ORAL at 09:21

## 2017-10-15 RX ADMIN — OXYCODONE HYDROCHLORIDE 10 MG: 5 TABLET ORAL at 19:39

## 2017-10-15 RX ADMIN — Medication 325 MG: at 17:06

## 2017-10-15 RX ADMIN — ACETAMINOPHEN 975 MG: 325 TABLET, FILM COATED ORAL at 04:12

## 2017-10-15 RX ADMIN — Medication 1 TABLET: at 09:21

## 2017-10-15 RX ADMIN — DEXTROSE AND SODIUM CHLORIDE: 5; 900 INJECTION, SOLUTION INTRAVENOUS at 12:48

## 2017-10-15 RX ADMIN — CLINDAMYCIN HYDROCHLORIDE 300 MG: 150 CAPSULE ORAL at 21:30

## 2017-10-15 RX ADMIN — Medication 325 MG: at 12:42

## 2017-10-15 RX ADMIN — CLINDAMYCIN HYDROCHLORIDE 300 MG: 150 CAPSULE ORAL at 09:21

## 2017-10-15 RX ADMIN — ACETAMINOPHEN 975 MG: 325 TABLET, FILM COATED ORAL at 19:39

## 2017-10-15 RX ADMIN — DEXTROSE AND SODIUM CHLORIDE: 5; 900 INJECTION, SOLUTION INTRAVENOUS at 04:12

## 2017-10-15 RX ADMIN — GABAPENTIN 600 MG: 300 CAPSULE ORAL at 09:21

## 2017-10-15 RX ADMIN — DOCUSATE SODIUM 100 MG: 100 CAPSULE ORAL at 09:21

## 2017-10-15 RX ADMIN — ACETAMINOPHEN 975 MG: 325 TABLET, FILM COATED ORAL at 12:42

## 2017-10-15 RX ADMIN — OXYCODONE HYDROCHLORIDE 5 MG: 5 TABLET ORAL at 14:39

## 2017-10-15 ASSESSMENT — PAIN SCALES - GENERAL
PAINLEVEL_OUTOF10: 8
PAINLEVEL_OUTOF10: 4
PAINLEVEL_OUTOF10: 5
PAINLEVEL_OUTOF10: 7
PAINLEVEL_OUTOF10: 5
PAINLEVEL_OUTOF10: 3

## 2017-10-15 NOTE — PROGRESS NOTES
"POD#4 - discharge cancelled due to inability to set up home IV antibiotics over weekend    S: Pain well controlled.  C/O headache this morning.  Tolerating reg diet.  No vomiting.  + Flatus and BM.  Voiding and ambulating without difficulty. Minimal vaginal bleeding.  No foul vaginal discharge. Pumping and dumping.     O:Blood pressure 117/66, pulse 63, temperature 37.3 °C (99.2 °F), resp. rate 18, height 1.575 m (5' 2\"), weight 97 kg (213 lb 13.5 oz), last menstrual period 12/09/2016, SpO2 92 %, currently breastfeeding.   Tmax 100.0 @ 00:00    General - alert, in NAD.  Nontoxic.  ABD - soft, NT, ND.  + BS.  Incision C/D/I without erythema or drainage. Staples in place.  EXT - 2+ pedal edema bilaterally, no calf tenderness.    A/P: POD #4 s/p TULIO, BS, LO, MAGO for TOA, complicated by Group A strep bacteremia.  Continue routine post op care.  Discontinue staples today.  Continue IV Rocephin per ID recs for 2 weeks at home.  Case management working on home antibiotics on Monday.  Continue Clindamycin x 1 week, then Augmenting per ID's recs. Anticipate discharge tomorrow morning.    "

## 2017-10-15 NOTE — PROGRESS NOTES
AAOx4.  Baby in room.    CARPENTER=.  Up self, steady gait.   Incision, staples, AMARILIS, CDI.  C/o headach, medicated per the MAR, ice pack given..  Regular diet, tolerating well.  -n/v.  Voids up to bathroom. +BS.  +flatus.

## 2017-10-15 NOTE — PROGRESS NOTES
Received report from day shift RN.   Pt A&O x 4.    Pt denies pain, nausea, vomiting, chest pain, shortness of breath at this time.   Incision stapled, AMARILIS, no infection sign noted.  +BM. +void.  Call light within reach. Bed locked and in lowest position.  Plan of care discussed with pt. All needs are met at this time.

## 2017-10-16 VITALS
WEIGHT: 213.85 LBS | TEMPERATURE: 97.5 F | SYSTOLIC BLOOD PRESSURE: 134 MMHG | RESPIRATION RATE: 18 BRPM | HEIGHT: 62 IN | BODY MASS INDEX: 39.35 KG/M2 | DIASTOLIC BLOOD PRESSURE: 74 MMHG | HEART RATE: 61 BPM | OXYGEN SATURATION: 94 %

## 2017-10-16 LAB
BACTERIA BLD CULT: NORMAL
BACTERIA BLD CULT: NORMAL
ERYTHROCYTE [DISTWIDTH] IN BLOOD BY AUTOMATED COUNT: 46 FL (ref 35.9–50)
HCT VFR BLD AUTO: 24.3 % (ref 37–47)
HGB BLD-MCNC: 8 G/DL (ref 12–16)
MCH RBC QN AUTO: 30.1 PG (ref 27–33)
MCHC RBC AUTO-ENTMCNC: 32.9 G/DL (ref 33.6–35)
MCV RBC AUTO: 91.4 FL (ref 81.4–97.8)
PLATELET # BLD AUTO: 493 K/UL (ref 164–446)
PMV BLD AUTO: 9.1 FL (ref 9–12.9)
RBC # BLD AUTO: 2.66 M/UL (ref 4.2–5.4)
SIGNIFICANT IND 70042: NORMAL
SIGNIFICANT IND 70042: NORMAL
SITE SITE: NORMAL
SITE SITE: NORMAL
SOURCE SOURCE: NORMAL
SOURCE SOURCE: NORMAL
WBC # BLD AUTO: 12.9 K/UL (ref 4.8–10.8)

## 2017-10-16 PROCEDURE — A9270 NON-COVERED ITEM OR SERVICE: HCPCS | Performed by: OBSTETRICS & GYNECOLOGY

## 2017-10-16 PROCEDURE — A9270 NON-COVERED ITEM OR SERVICE: HCPCS | Performed by: INTERNAL MEDICINE

## 2017-10-16 PROCEDURE — 700102 HCHG RX REV CODE 250 W/ 637 OVERRIDE(OP): Performed by: OBSTETRICS & GYNECOLOGY

## 2017-10-16 PROCEDURE — 700111 HCHG RX REV CODE 636 W/ 250 OVERRIDE (IP): Performed by: INTERNAL MEDICINE

## 2017-10-16 PROCEDURE — 700112 HCHG RX REV CODE 229: Performed by: OBSTETRICS & GYNECOLOGY

## 2017-10-16 PROCEDURE — 700102 HCHG RX REV CODE 250 W/ 637 OVERRIDE(OP): Performed by: INTERNAL MEDICINE

## 2017-10-16 PROCEDURE — 85027 COMPLETE CBC AUTOMATED: CPT

## 2017-10-16 RX ADMIN — CLINDAMYCIN HYDROCHLORIDE 300 MG: 150 CAPSULE ORAL at 08:27

## 2017-10-16 RX ADMIN — ACETAMINOPHEN 975 MG: 325 TABLET, FILM COATED ORAL at 01:51

## 2017-10-16 RX ADMIN — Medication 325 MG: at 08:27

## 2017-10-16 RX ADMIN — Medication 325 MG: at 12:55

## 2017-10-16 RX ADMIN — POTASSIUM CHLORIDE 20 MEQ: 1500 TABLET, EXTENDED RELEASE ORAL at 08:27

## 2017-10-16 RX ADMIN — OXYCODONE HYDROCHLORIDE 10 MG: 5 TABLET ORAL at 01:01

## 2017-10-16 RX ADMIN — ACETAMINOPHEN 975 MG: 325 TABLET, FILM COATED ORAL at 08:26

## 2017-10-16 RX ADMIN — Medication 1 TABLET: at 08:27

## 2017-10-16 RX ADMIN — GABAPENTIN 600 MG: 300 CAPSULE ORAL at 08:27

## 2017-10-16 RX ADMIN — CEFTRIAXONE 2 G: 2 INJECTION, SOLUTION INTRAVENOUS at 08:27

## 2017-10-16 RX ADMIN — CLINDAMYCIN HYDROCHLORIDE 300 MG: 150 CAPSULE ORAL at 12:55

## 2017-10-16 RX ADMIN — OXYCODONE HYDROCHLORIDE 10 MG: 5 TABLET ORAL at 12:55

## 2017-10-16 RX ADMIN — OXYCODONE HYDROCHLORIDE 10 MG: 5 TABLET ORAL at 08:27

## 2017-10-16 RX ADMIN — DOCUSATE SODIUM 100 MG: 100 CAPSULE ORAL at 08:27

## 2017-10-16 ASSESSMENT — PAIN SCALES - GENERAL
PAINLEVEL_OUTOF10: 3
PAINLEVEL_OUTOF10: 7
PAINLEVEL_OUTOF10: 2
PAINLEVEL_OUTOF10: 6

## 2017-10-16 NOTE — DISCHARGE PLANNING
Per the request of the Sw on floor the CCS scheduled the appointment for the [facility] for tomorrow 10/17/2017 at 1300. ISAAC on floor has been notified.

## 2017-10-16 NOTE — DISCHARGE SUMMARY
31 y.o. .female  S/p TULIO/LSO, Right Salpingectomy , MAGO for Group A Sepsis : , on  10/11/2017  Patient was discharged on:10/14/2017   Delayed secondary to Need to Arrange for Home IV antibiotic RX     Secondary to a need to continue to observe the, the patient was boarded for an additional 48 hr.   Discharge meds, orders, labs, and f/u instructions are unchanged.

## 2017-10-16 NOTE — DISCHARGE INSTRUCTIONS
Discharge Instructions    Discharged to home by car with relative. Discharged via wheelchair, hospital escort: Yes.  Special equipment needed: Not Applicable    Be sure to schedule a follow-up appointment with your primary care doctor or any specialists as instructed.     Discharge Plan:   Diet Plan: Discussed  Activity Level: Discussed  Confirmed Follow up Appointment: Appointment Scheduled  Confirmed Symptoms Management: Discussed  Medication Reconciliation Updated: Yes  Influenza Vaccine Indication: Patient Refuses    I understand that a diet low in cholesterol, fat, and sodium is recommended for good health. Unless I have been given specific instructions below for another diet, I accept this instruction as my diet prescription.   Other diet:     Special Instructions:     · Keep Steri Strips Dry,Shower /cover with Plastic as directed  ·   · Is patient discharged on Warfarin / Coumadin?   No     · Is patient Post Blood Transfusion?  No    Depression / Suicide Risk    As you are discharged from this [facility], it is important to learn how to keep safe from harming yourself.    Recognize the warning signs:  · Abrupt changes in personality, positive or negative- including increase in energy   · Giving away possessions  · Change in eating patterns- significant weight changes-  positive or negative  · Change in sleeping patterns- unable to sleep or sleeping all the time   · Unwillingness or inability to communicate  · Depression  · Unusual sadness, discouragement and loneliness  · Talk of wanting to die  · Neglect of personal appearance   · Rebelliousness- reckless behavior  · Withdrawal from people/activities they love  · Confusion- inability to concentrate     If you or a loved one observes any of these behaviors or has concerns about self-harm, here's what you can do:  · Talk about it- your feelings and reasons for harming yourself  · Remove any means that you might use to hurt yourself (examples:  pills, rope, extension cords, firearm)  · Get professional help from the community (Mental Health, Substance Abuse, psychological counseling)  · Do not be alone:Call your Safe Contact- someone whom you trust who will be there for you.  · Call your local CRISIS HOTLINE 011-6530 or 675-534-9424  · Call your local Children's Mobile Crisis Response Team [place] (056) 411-8015 or www.Webcrumbz  · Call the toll free National Suicide Prevention Hotlines   · National Suicide Prevention Lifeline 418-115-AMFT (2343)  · [employer] Hope Line Network 800-SUICIDE (182-9011)    Sepsis, Adult  Sepsis is a serious infection of your blood or tissues that affects your whole body. The infection that causes sepsis may be bacterial, viral, fungal, or parasitic. Sepsis may be life threatening. Sepsis can cause your blood pressure to drop. This may result in shock. Shock causes your central nervous system and your organs to stop working correctly.   RISK FACTORS  Sepsis can happen in anyone, but it is more likely to happen in people who have weakened immune systems.  SIGNS AND SYMPTOMS   Symptoms of sepsis can include:  · Fever or low body temperature (hypothermia).  · Rapid breathing (hyperventilation).  · Chills.  · Rapid heartbeat (tachycardia).  · Confusion or light-headedness.  · Trouble breathing.  · Urinating much less than usual.  · Cool, clammy skin or red, flushed skin.  · Other problems with the heart, kidneys, or brain.  DIAGNOSIS   Your health care provider will likely do tests to look for an infection, to see if the infection has spread to your blood, and to see how serious your condition is. Tests can include:  · Blood tests, including cultures of your blood.  · Cultures of other fluids from your body, such as:  ¨ Urine.  ¨ Pus from wounds.  ¨ Mucus coughed up from your lungs.  · Urine tests other than cultures.  · X-ray exams or other imaging tests.  TREATMENT   Treatment will begin with elimination of the source  of infection. If your sepsis is likely caused by a bacterial or fungal infection, you will be given antibiotic or antifungal medicines.  You may also receive:  · Oxygen.  · Fluids through an IV tube.  · Medicines to increase your blood pressure.  · A machine to clean your blood (dialysis) if your kidneys fail.  · A machine to help you breathe if your lungs fail.  SEEK IMMEDIATE MEDICAL CARE IF:  You get an infection or develop any of the signs and symptoms of sepsis after surgery or a hospitalization.     This information is not intended to replace advice given to you by your health care provider. Make sure you discuss any questions you have with your health care provider.     Document Released: 09/15/2004 Document Revised: 05/03/2016 Document Reviewed: 08/25/2014  [employer] Interactive Patient Education ©2016 [employer]vier Inc.      Hysterectomy Information   A hysterectomy is a surgery in which your uterus is removed. This surgery may be done to treat various medical problems. After the surgery, you will no longer have menstrual periods. The surgery will also make you unable to become pregnant (sterile). The fallopian tubes and ovaries can be removed (bilateral salpingo-oophorectomy) during this surgery as well.   REASONS FOR A HYSTERECTOMY  · Persistent, abnormal bleeding.  · Lasting (chronic) pelvic pain or infection.  · The lining of the uterus (endometrium) starts growing outside the uterus (endometriosis).  · The endometrium starts growing in the muscle of the uterus (adenomyosis).  · The uterus falls down into the vagina (pelvic organ prolapse).  · Noncancerous growths in the uterus (uterine fibroids) that cause symptoms.  · Precancerous cells.  · Cervical cancer or uterine cancer.  TYPES OF HYSTERECTOMIES  · Supracervical hysterectomy--In this type, the top part of the uterus is removed, but not the cervix.  · Total hysterectomy--The uterus and cervix are removed.  · Radical hysterectomy--The uterus, the cervix,  and the fibrous tissue that holds the uterus in place in the pelvis (parametrium) are removed.  WAYS A HYSTERECTOMY CAN BE PERFORMED  · Abdominal hysterectomy--A large surgical cut (incision) is made in the abdomen. The uterus is removed through this incision.  · Vaginal hysterectomy--An incision is made in the vagina. The uterus is removed through this incision. There are no abdominal incisions.  · Conventional laparoscopic hysterectomy--Three or four small incisions are made in the abdomen. A thin, lighted tube with a camera (laparoscope) is inserted into one of the incisions. Other tools are put through the other incisions. The uterus is cut into small pieces. The small pieces are removed through the incisions, or they are removed through the vagina.  · Laparoscopically assisted vaginal hysterectomy (LAVH)--Three or four small incisions are made in the abdomen. Part of the surgery is performed laparoscopically and part vaginally. The uterus is removed through the vagina.  · Robot-assisted laparoscopic hysterectomy--A laparoscope and other tools are inserted into 3 or 4 small incisions in the abdomen. A computer-controlled device is used to give the surgeon a 3D image and to help control the surgical instruments. This allows for more precise movements of surgical instruments. The uterus is cut into small pieces and removed through the incisions or removed through the vagina.  RISKS AND COMPLICATIONS   Possible complications associated with this procedure include:  · Bleeding and risk of blood transfusion. Tell your health care provider if you do not want to receive any blood products.  · Blood clots in the legs or lung.  · Infection.  · Injury to surrounding organs.  · Problems or side effects related to anesthesia.  · Conversion to an abdominal hysterectomy from one of the other techniques.  WHAT TO EXPECT AFTER A HYSTERECTOMY  · You will be given pain medicine.  · You will need to have someone with you for the  first 3-5 days after you go home.  · You will need to follow up with your surgeon in 2-4 weeks after surgery to evaluate your progress.  · You may have early menopause symptoms such as hot flashes, night sweats, and insomnia.  · If you had a hysterectomy for a problem that was not cancer or not a condition that could lead to cancer, then you no longer need Pap tests. However, even if you no longer need a Pap test, a regular exam is a good idea to make sure no other problems are starting.     This information is not intended to replace advice given to you by your health care provider. Make sure you discuss any questions you have with your health care provider.     Document Released: 06/13/2002 Document Revised: 10/08/2014 Document Reviewed: 08/25/2014  Elsevier Interactive Patient Education ©2016 Elsevier Inc.

## 2017-10-16 NOTE — DISCHARGE PLANNING
ISAAC faxed outpatient infusion slip to [facility]. Pt insurance is [employer]. Unknown if pt can dc today due to availability of appointments. SW to f/u with [facility].     Pt has appointment with outpatient infusion at 1700 on 10/17/2017. Pt reports having supports to drive her to and from appointments due to inability to drive.

## 2017-10-16 NOTE — PROGRESS NOTES
Patient concerned as she began to develop similar pain to when she was first admitted. Assessed abdomen and pelvis, no distension or rigidity noted. Staples were recently discontinued, no bleeding or signs of dehiscing noted on incision. Steri-strips remain in place. Oxycodone and Tylenol PRN were administered to help alleviate pain. Ice pack also given. Dr. Medley, on call for Dr. Low, was notified regarding patient's concerns. No orders received.

## 2017-10-16 NOTE — CARE PLAN
Problem: Safety  Goal: Will remain free from falls  Outcome: PROGRESSING AS EXPECTED  Fall precautions in place.  Pt is up self, steady gait.     Problem: Pain Management  Goal: Pain level will decrease to patient's comfort goal  Outcome: PROGRESSING AS EXPECTED  Pt's main complaint of pain was headach.  Medication + ice pack given throughout day, pt's pain well controlled.

## 2017-10-16 NOTE — CARE PLAN
Problem: Safety  Goal: Will remain free from injury  Outcome: PROGRESSING AS EXPECTED  Safety precautions in place. Bed in locked/low position. 2 side rails up. Treaded socks. Call light in reach, calls appropriately. Hourly rounding practiced.    Problem: Pain Management  Goal: Pain level will decrease to patient's comfort goal  Outcome: PROGRESSING AS EXPECTED  Oxycodone and Tylenol PRN used for pain control.

## 2017-10-16 NOTE — PROGRESS NOTES
Went over d/c instructions with pt.  Follow up appt.  Medications.  Prescriptions and copy of d/c instructions given to pt.  Pt had no further questions.  Pt discharged by wheel chair at 1315.

## 2017-10-17 ENCOUNTER — OUTPATIENT INFUSION SERVICES (OUTPATIENT)
Dept: ONCOLOGY | Facility: MEDICAL CENTER | Age: 32
End: 2017-10-17
Attending: INTERNAL MEDICINE
Payer: COMMERCIAL

## 2017-10-17 VITALS
SYSTOLIC BLOOD PRESSURE: 117 MMHG | WEIGHT: 216.49 LBS | OXYGEN SATURATION: 95 % | RESPIRATION RATE: 18 BRPM | HEIGHT: 62 IN | HEART RATE: 73 BPM | TEMPERATURE: 97.5 F | BODY MASS INDEX: 39.84 KG/M2 | DIASTOLIC BLOOD PRESSURE: 69 MMHG

## 2017-10-17 DIAGNOSIS — A41.9 SEPSIS, DUE TO UNSPECIFIED ORGANISM: ICD-10-CM

## 2017-10-17 LAB
ALBUMIN SERPL BCP-MCNC: 2.9 G/DL (ref 3.2–4.9)
ALBUMIN/GLOB SERPL: 0.8 G/DL
ALP SERPL-CCNC: 76 U/L (ref 30–99)
ALT SERPL-CCNC: 12 U/L (ref 2–50)
ANION GAP SERPL CALC-SCNC: 11 MMOL/L (ref 0–11.9)
AST SERPL-CCNC: 13 U/L (ref 12–45)
BILIRUB SERPL-MCNC: 0.4 MG/DL (ref 0.1–1.5)
BUN SERPL-MCNC: 9 MG/DL (ref 8–22)
CALCIUM SERPL-MCNC: 9 MG/DL (ref 8.5–10.5)
CHLORIDE SERPL-SCNC: 102 MMOL/L (ref 96–112)
CO2 SERPL-SCNC: 28 MMOL/L (ref 20–33)
CREAT SERPL-MCNC: 0.93 MG/DL (ref 0.5–1.4)
GFR SERPL CREATININE-BSD FRML MDRD: >60 ML/MIN/1.73 M 2
GLOBULIN SER CALC-MCNC: 3.7 G/DL (ref 1.9–3.5)
GLUCOSE SERPL-MCNC: 84 MG/DL (ref 65–99)
POTASSIUM SERPL-SCNC: 3.7 MMOL/L (ref 3.6–5.5)
PROT SERPL-MCNC: 6.6 G/DL (ref 6–8.2)
SODIUM SERPL-SCNC: 141 MMOL/L (ref 135–145)

## 2017-10-17 PROCEDURE — 36592 COLLECT BLOOD FROM PICC: CPT

## 2017-10-17 PROCEDURE — 700111 HCHG RX REV CODE 636 W/ 250 OVERRIDE (IP): Performed by: INTERNAL MEDICINE

## 2017-10-17 PROCEDURE — 96365 THER/PROPH/DIAG IV INF INIT: CPT

## 2017-10-17 PROCEDURE — 80053 COMPREHEN METABOLIC PANEL: CPT

## 2017-10-17 PROCEDURE — 700105 HCHG RX REV CODE 258: Performed by: INTERNAL MEDICINE

## 2017-10-17 RX ORDER — MILK THISTLE 150 MG
CAPSULE ORAL
COMMUNITY

## 2017-10-17 RX ADMIN — CEFTRIAXONE 2 G: 2 INJECTION, POWDER, FOR SOLUTION INTRAMUSCULAR; INTRAVENOUS at 17:19

## 2017-10-17 ASSESSMENT — PAIN SCALES - GENERAL: PAINLEVEL: 2=MINIMAL-SLIGHT

## 2017-10-18 ENCOUNTER — OUTPATIENT INFUSION SERVICES (OUTPATIENT)
Dept: ONCOLOGY | Facility: MEDICAL CENTER | Age: 32
End: 2017-10-18
Attending: INTERNAL MEDICINE
Payer: COMMERCIAL

## 2017-10-18 VITALS
SYSTOLIC BLOOD PRESSURE: 131 MMHG | DIASTOLIC BLOOD PRESSURE: 81 MMHG | HEART RATE: 80 BPM | TEMPERATURE: 98.2 F | RESPIRATION RATE: 18 BRPM | OXYGEN SATURATION: 96 %

## 2017-10-18 PROCEDURE — 700111 HCHG RX REV CODE 636 W/ 250 OVERRIDE (IP): Performed by: INTERNAL MEDICINE

## 2017-10-18 PROCEDURE — 700105 HCHG RX REV CODE 258: Performed by: INTERNAL MEDICINE

## 2017-10-18 PROCEDURE — 96365 THER/PROPH/DIAG IV INF INIT: CPT

## 2017-10-18 RX ADMIN — CEFTRIAXONE 2 G: 2 INJECTION, POWDER, FOR SOLUTION INTRAMUSCULAR; INTRAVENOUS at 16:56

## 2017-10-18 ASSESSMENT — PAIN SCALES - GENERAL: PAINLEVEL: 5=MODERATE PAIN

## 2017-10-18 NOTE — PROGRESS NOTES
"Prior to pt's arrival at 1000 RN notified Jazzy Mani ABEL with [facility] that last CMP was on 10/12/17, order received to draw lab with appt today. Pt presents ambulatory for initial [facility]IC Rocephin infusion for tuboovarian abscess post . Pt reports that she is not breastfeeding baby at this time and continues to \"pump and dump\" until she has been cleared by MD to breastfeed. RN provided emotional support, pt reports she has family support system at home. POC reviewed with and handout on medication provided. Pt reports that she was contacted by [facility] and has an appt scheduled with them next week. PICC accessed and blood return noted. Medication infused over 30 mins without complications or adverse reactions. Pt reported difficulty with appetite, RN provided smoothie education and handout. PICC flushed with saline and CMP drawn, line flushed per protocol and gauze with arm wrap applied. Pt to return tomorrow, appt confirmed with pt and she reports having appt printout at home. Pt left ambulatory in no distress at 1758, she reported that her dad provided transportation for her this evening. RN placed lactation consultant number in pt's chart to be given to pt on 10/18/17  "

## 2017-10-19 ENCOUNTER — OUTPATIENT INFUSION SERVICES (OUTPATIENT)
Dept: ONCOLOGY | Facility: MEDICAL CENTER | Age: 32
End: 2017-10-19
Attending: INTERNAL MEDICINE
Payer: COMMERCIAL

## 2017-10-19 VITALS
DIASTOLIC BLOOD PRESSURE: 71 MMHG | OXYGEN SATURATION: 97 % | RESPIRATION RATE: 18 BRPM | HEART RATE: 74 BPM | SYSTOLIC BLOOD PRESSURE: 108 MMHG | TEMPERATURE: 97.6 F

## 2017-10-19 DIAGNOSIS — R11.2 NAUSEA AND VOMITING, INTRACTABILITY OF VOMITING NOT SPECIFIED, UNSPECIFIED VOMITING TYPE: ICD-10-CM

## 2017-10-19 DIAGNOSIS — R10.9 ABDOMINAL PAIN, UNSPECIFIED ABDOMINAL LOCATION: ICD-10-CM

## 2017-10-19 DIAGNOSIS — A41.9 SEPSIS, DUE TO UNSPECIFIED ORGANISM: ICD-10-CM

## 2017-10-19 DIAGNOSIS — G43.909 MIGRAINE WITHOUT STATUS MIGRAINOSUS, NOT INTRACTABLE, UNSPECIFIED MIGRAINE TYPE: ICD-10-CM

## 2017-10-19 PROCEDURE — 96365 THER/PROPH/DIAG IV INF INIT: CPT

## 2017-10-19 PROCEDURE — 700111 HCHG RX REV CODE 636 W/ 250 OVERRIDE (IP): Performed by: NURSE PRACTITIONER

## 2017-10-19 PROCEDURE — 700111 HCHG RX REV CODE 636 W/ 250 OVERRIDE (IP): Performed by: INTERNAL MEDICINE

## 2017-10-19 PROCEDURE — 700105 HCHG RX REV CODE 258: Performed by: INTERNAL MEDICINE

## 2017-10-19 RX ORDER — ONDANSETRON 4 MG/1
4 TABLET, ORALLY DISINTEGRATING ORAL ONCE
Status: COMPLETED | OUTPATIENT
Start: 2017-10-19 | End: 2017-10-19

## 2017-10-19 RX ADMIN — ONDANSETRON 4 MG: 4 TABLET, ORALLY DISINTEGRATING ORAL at 15:00

## 2017-10-19 RX ADMIN — CEFTRIAXONE 2 G: 2 INJECTION, POWDER, FOR SOLUTION INTRAMUSCULAR; INTRAVENOUS at 14:40

## 2017-10-19 ASSESSMENT — PAIN SCALES - GENERAL: PAINLEVEL: 3=SLIGHT PAIN

## 2017-10-19 NOTE — PROGRESS NOTES
Pt here for daily IV abx.  Pt states no changes from yesterday.  PICC patent with brisk blood return.  Lactation consultant phone number given.  Abx infused without issue.  PICC flushed per policy.  Tomorrow's apt confirmed.  Pt left IC, no s/s of distress.

## 2017-10-20 ENCOUNTER — POST PARTUM (OUTPATIENT)
Dept: OBGYN | Facility: CLINIC | Age: 32
End: 2017-10-20
Payer: COMMERCIAL

## 2017-10-20 ENCOUNTER — OUTPATIENT INFUSION SERVICES (OUTPATIENT)
Dept: ONCOLOGY | Facility: MEDICAL CENTER | Age: 32
End: 2017-10-20
Attending: INTERNAL MEDICINE
Payer: COMMERCIAL

## 2017-10-20 VITALS
BODY MASS INDEX: 38.46 KG/M2 | WEIGHT: 209 LBS | DIASTOLIC BLOOD PRESSURE: 80 MMHG | SYSTOLIC BLOOD PRESSURE: 128 MMHG | HEIGHT: 62 IN

## 2017-10-20 VITALS
TEMPERATURE: 97.6 F | HEART RATE: 86 BPM | DIASTOLIC BLOOD PRESSURE: 73 MMHG | OXYGEN SATURATION: 93 % | BODY MASS INDEX: 39.6 KG/M2 | RESPIRATION RATE: 18 BRPM | WEIGHT: 216.49 LBS | SYSTOLIC BLOOD PRESSURE: 120 MMHG

## 2017-10-20 DIAGNOSIS — R11.2 NAUSEA AND VOMITING, INTRACTABILITY OF VOMITING NOT SPECIFIED, UNSPECIFIED VOMITING TYPE: ICD-10-CM

## 2017-10-20 DIAGNOSIS — R11.0 NAUSEA: ICD-10-CM

## 2017-10-20 DIAGNOSIS — R10.9 ABDOMINAL PAIN, UNSPECIFIED ABDOMINAL LOCATION: ICD-10-CM

## 2017-10-20 DIAGNOSIS — G43.909 MIGRAINE WITHOUT STATUS MIGRAINOSUS, NOT INTRACTABLE, UNSPECIFIED MIGRAINE TYPE: ICD-10-CM

## 2017-10-20 PROCEDURE — 96365 THER/PROPH/DIAG IV INF INIT: CPT

## 2017-10-20 PROCEDURE — 700111 HCHG RX REV CODE 636 W/ 250 OVERRIDE (IP): Performed by: INTERNAL MEDICINE

## 2017-10-20 PROCEDURE — 700111 HCHG RX REV CODE 636 W/ 250 OVERRIDE (IP)

## 2017-10-20 PROCEDURE — 700105 HCHG RX REV CODE 258

## 2017-10-20 RX ORDER — ONDANSETRON 4 MG/1
4 TABLET, ORALLY DISINTEGRATING ORAL EVERY 8 HOURS PRN
Qty: 30 TAB | Refills: 0 | Status: SHIPPED | OUTPATIENT
Start: 2017-10-20

## 2017-10-20 RX ORDER — ONDANSETRON 4 MG/1
4 TABLET, ORALLY DISINTEGRATING ORAL ONCE
Status: COMPLETED | OUTPATIENT
Start: 2017-10-20 | End: 2017-10-20

## 2017-10-20 RX ADMIN — CEFTRIAXONE 2 G: 2 INJECTION, POWDER, FOR SOLUTION INTRAMUSCULAR; INTRAVENOUS at 17:10

## 2017-10-20 RX ADMIN — ONDANSETRON 4 MG: 4 TABLET, ORALLY DISINTEGRATING ORAL at 17:01

## 2017-10-20 ASSESSMENT — PAIN SCALES - GENERAL: PAINLEVEL: 3=SLIGHT PAIN

## 2017-10-20 NOTE — PROGRESS NOTES
"Murray presents for f/u of Group A strep bacteremia, and is POD # 9 s/p TULIO, BS, left oophorectomy.      S: C/o right sided pain.  No fevers.  Requesting nausea medication because she feels nauseated with taking IV Rocephin infusion at home.  No vomiting.  Passing flatus.  + BM.  She is very anxious and tearful today.      O:Blood pressure 128/80, height 1.575 m (5' 2\"), weight 94.8 kg (209 lb), unknown if currently breastfeeding.  General - teary, anxious, nontoxic appearing.    ABD - soft, tenderness in RLQ, no rebound. + voluntary guarding. Nondistended.  INCISION - healing well, no erythema      A/P: POD #9 s/p TULIO, CM, LO for TOA and sepsis. On Rocephin 2 grams IV daily and Clindamycin.  Pt extremely anxious and tearful.  Exam is tender on right, appropriate for post-op status.  Gave patient the option of going to ER for labs and abdominal CT, vs. Further observation.  She would like to observe for now.  Recommend restarting Lexapro 50 mg daily.  Script for Zofran given for infusion.  Pt will go to ER if pain worsens, otherwise f/u with me on Tuesday.   "

## 2017-10-20 NOTE — LETTER
October 20, 2017              Murray Chavez is currently being cared for at [facility].  Patient came in for visit for post op appointment today.         Thank you,          Barbra Ulloa M.D.

## 2017-10-20 NOTE — PROGRESS NOTES
Mrs Chavez returns for IVABX. Rocephin infused as ordered. Murray tolerated well and without incident. Reports nauseas, orders received for Zofran PRN, while infusing. PICC line in good condition and has positive blood return. PICC line flushed with saline per protocol. Murray DC'd home in good condition. Returns daily.

## 2017-10-21 ENCOUNTER — OUTPATIENT INFUSION SERVICES (OUTPATIENT)
Dept: ONCOLOGY | Facility: MEDICAL CENTER | Age: 32
End: 2017-10-21
Attending: INTERNAL MEDICINE
Payer: COMMERCIAL

## 2017-10-21 VITALS
OXYGEN SATURATION: 95 % | RESPIRATION RATE: 18 BRPM | HEART RATE: 82 BPM | BODY MASS INDEX: 38.23 KG/M2 | SYSTOLIC BLOOD PRESSURE: 129 MMHG | TEMPERATURE: 97 F | WEIGHT: 209 LBS | DIASTOLIC BLOOD PRESSURE: 81 MMHG

## 2017-10-21 DIAGNOSIS — R11.2 NAUSEA AND VOMITING, INTRACTABILITY OF VOMITING NOT SPECIFIED, UNSPECIFIED VOMITING TYPE: ICD-10-CM

## 2017-10-21 DIAGNOSIS — R10.9 ABDOMINAL PAIN, UNSPECIFIED ABDOMINAL LOCATION: ICD-10-CM

## 2017-10-21 DIAGNOSIS — G43.909 MIGRAINE WITHOUT STATUS MIGRAINOSUS, NOT INTRACTABLE, UNSPECIFIED MIGRAINE TYPE: ICD-10-CM

## 2017-10-21 PROCEDURE — 96365 THER/PROPH/DIAG IV INF INIT: CPT

## 2017-10-21 PROCEDURE — 700111 HCHG RX REV CODE 636 W/ 250 OVERRIDE (IP): Performed by: INTERNAL MEDICINE

## 2017-10-21 PROCEDURE — 700111 HCHG RX REV CODE 636 W/ 250 OVERRIDE (IP): Performed by: NURSE PRACTITIONER

## 2017-10-21 RX ORDER — ONDANSETRON 4 MG/1
4 TABLET, ORALLY DISINTEGRATING ORAL ONCE
Status: COMPLETED | OUTPATIENT
Start: 2017-10-21 | End: 2017-10-21

## 2017-10-21 RX ADMIN — CEFTRIAXONE 2 G: 2 INJECTION, SOLUTION INTRAVENOUS at 15:38

## 2017-10-21 RX ADMIN — ONDANSETRON 4 MG: 4 TABLET, ORALLY DISINTEGRATING ORAL at 15:38

## 2017-10-21 ASSESSMENT — PAIN SCALES - GENERAL: PAINLEVEL: 2=MINIMAL-SLIGHT

## 2017-10-21 NOTE — PROGRESS NOTES
Mrs Chavez returns for IVABX. Rocephin infused as ordered. Murray tolerated well and without incident. Reports nauseas,  Zofran PO given, while infusing. PICC line in good condition and has positive blood return. PICC line flushed with saline per protocol. Murray DC'd home in good condition. Returns daily.

## 2017-10-22 ENCOUNTER — OUTPATIENT INFUSION SERVICES (OUTPATIENT)
Dept: ONCOLOGY | Facility: MEDICAL CENTER | Age: 32
End: 2017-10-22
Attending: INTERNAL MEDICINE
Payer: COMMERCIAL

## 2017-10-22 VITALS
DIASTOLIC BLOOD PRESSURE: 78 MMHG | OXYGEN SATURATION: 96 % | BODY MASS INDEX: 38.23 KG/M2 | TEMPERATURE: 97.8 F | WEIGHT: 209 LBS | RESPIRATION RATE: 18 BRPM | SYSTOLIC BLOOD PRESSURE: 135 MMHG | HEART RATE: 71 BPM

## 2017-10-22 PROCEDURE — 700111 HCHG RX REV CODE 636 W/ 250 OVERRIDE (IP)

## 2017-10-22 PROCEDURE — 96365 THER/PROPH/DIAG IV INF INIT: CPT

## 2017-10-22 PROCEDURE — 700111 HCHG RX REV CODE 636 W/ 250 OVERRIDE (IP): Performed by: NURSE PRACTITIONER

## 2017-10-22 RX ORDER — ONDANSETRON 4 MG/1
4 TABLET, ORALLY DISINTEGRATING ORAL
Status: COMPLETED | OUTPATIENT
Start: 2017-10-22 | End: 2017-10-22

## 2017-10-22 RX ADMIN — ONDANSETRON 4 MG: 4 TABLET, ORALLY DISINTEGRATING ORAL at 16:20

## 2017-10-22 RX ADMIN — CEFTRIAXONE 2 G: 2 INJECTION, SOLUTION INTRAVENOUS at 16:20

## 2017-10-22 ASSESSMENT — PAIN SCALES - GENERAL: PAINLEVEL: 2=MINIMAL-SLIGHT

## 2017-10-22 NOTE — PROGRESS NOTES
Patient arrived ambulatory to the [facility] IV Rocephin. Reviewed vital signs, labs, and physician order. Visualized brisk blood return from SL PICC to E, oral Zofran administered per MD order r/t infusion induced nausea. Antibiotic administered, no adverse reaction observed. PICC line flushed, clave cover placed, central line dressing changed with sterile technique, 4x4 gauze and mesh sleeve placed for protection. Confirmed upcoming apt date and time with patient. Patient left the [facility] ambulatory in no signs of distress.

## 2017-10-22 NOTE — PROGRESS NOTES
Pt is here for her scheduled IVABX. Zofran given to prevent nausea. Pt states having tolerated her antibiotics fairly well except for fatigue. She has three small children at home. Infusion completed without an incident. Discharged home to self care. Returns daily.

## 2017-10-23 ENCOUNTER — OFFICE VISIT (OUTPATIENT)
Dept: INFECTIOUS DISEASES | Facility: MEDICAL CENTER | Age: 32
End: 2017-10-23
Payer: COMMERCIAL

## 2017-10-23 ENCOUNTER — OUTPATIENT INFUSION SERVICES (OUTPATIENT)
Dept: ONCOLOGY | Facility: MEDICAL CENTER | Age: 32
End: 2017-10-23
Attending: INTERNAL MEDICINE
Payer: COMMERCIAL

## 2017-10-23 VITALS
HEART RATE: 75 BPM | RESPIRATION RATE: 18 BRPM | DIASTOLIC BLOOD PRESSURE: 79 MMHG | OXYGEN SATURATION: 98 % | BODY MASS INDEX: 37.61 KG/M2 | HEIGHT: 62 IN | WEIGHT: 204.37 LBS | TEMPERATURE: 97.7 F | SYSTOLIC BLOOD PRESSURE: 132 MMHG

## 2017-10-23 VITALS
OXYGEN SATURATION: 93 % | WEIGHT: 204.2 LBS | DIASTOLIC BLOOD PRESSURE: 78 MMHG | HEIGHT: 62 IN | TEMPERATURE: 98.8 F | HEART RATE: 78 BPM | BODY MASS INDEX: 37.58 KG/M2 | SYSTOLIC BLOOD PRESSURE: 122 MMHG

## 2017-10-23 DIAGNOSIS — R10.31 RIGHT LOWER QUADRANT ABDOMINAL PAIN: ICD-10-CM

## 2017-10-23 DIAGNOSIS — A41.9 SEPSIS, DUE TO UNSPECIFIED ORGANISM: ICD-10-CM

## 2017-10-23 DIAGNOSIS — A40.0 SEPSIS DUE TO GROUP A STREPTOCOCCUS (HCC): Primary | ICD-10-CM

## 2017-10-23 LAB
ALBUMIN SERPL BCP-MCNC: 3.6 G/DL (ref 3.2–4.9)
ALP SERPL-CCNC: 70 U/L (ref 30–99)
ALT SERPL-CCNC: 6 U/L (ref 2–50)
AST SERPL-CCNC: 13 U/L (ref 12–45)
BASOPHILS # BLD AUTO: 1.2 % (ref 0–1.8)
BASOPHILS # BLD: 0.1 K/UL (ref 0–0.12)
BILIRUB CONJ SERPL-MCNC: <0.1 MG/DL (ref 0.1–0.5)
BILIRUB INDIRECT SERPL-MCNC: NORMAL MG/DL (ref 0–1)
BILIRUB SERPL-MCNC: 0.3 MG/DL (ref 0.1–1.5)
BUN SERPL-MCNC: 9 MG/DL (ref 8–22)
CALCIUM SERPL-MCNC: 9.6 MG/DL (ref 8.5–10.5)
CHLORIDE SERPL-SCNC: 103 MMOL/L (ref 96–112)
CO2 SERPL-SCNC: 24 MMOL/L (ref 20–33)
CREAT SERPL-MCNC: 0.9 MG/DL (ref 0.5–1.4)
EOSINOPHIL # BLD AUTO: 0.05 K/UL (ref 0–0.51)
EOSINOPHIL NFR BLD: 0.6 % (ref 0–6.9)
ERYTHROCYTE [DISTWIDTH] IN BLOOD BY AUTOMATED COUNT: 46.9 FL (ref 35.9–50)
GFR SERPL CREATININE-BSD FRML MDRD: >60 ML/MIN/1.73 M 2
GLUCOSE SERPL-MCNC: 131 MG/DL (ref 65–99)
HCT VFR BLD AUTO: 31.8 % (ref 37–47)
HGB BLD-MCNC: 10.3 G/DL (ref 12–16)
IMM GRANULOCYTES # BLD AUTO: 0.12 K/UL (ref 0–0.11)
IMM GRANULOCYTES NFR BLD AUTO: 1.5 % (ref 0–0.9)
LYMPHOCYTES # BLD AUTO: 2.46 K/UL (ref 1–4.8)
LYMPHOCYTES NFR BLD: 30.2 % (ref 22–41)
MCH RBC QN AUTO: 29.7 PG (ref 27–33)
MCHC RBC AUTO-ENTMCNC: 32.4 G/DL (ref 33.6–35)
MCV RBC AUTO: 91.6 FL (ref 81.4–97.8)
MONOCYTES # BLD AUTO: 0.64 K/UL (ref 0–0.85)
MONOCYTES NFR BLD AUTO: 7.9 % (ref 0–13.4)
NEUTROPHILS # BLD AUTO: 4.77 K/UL (ref 2–7.15)
NEUTROPHILS NFR BLD: 58.6 % (ref 44–72)
NRBC # BLD AUTO: 0 K/UL
NRBC BLD AUTO-RTO: 0 /100 WBC
PHOSPHATE SERPL-MCNC: 3.6 MG/DL (ref 2.5–4.5)
PLATELET # BLD AUTO: 571 K/UL (ref 164–446)
PMV BLD AUTO: 9 FL (ref 9–12.9)
POTASSIUM SERPL-SCNC: 3.5 MMOL/L (ref 3.6–5.5)
PROT SERPL-MCNC: 7.7 G/DL (ref 6–8.2)
RBC # BLD AUTO: 3.47 M/UL (ref 4.2–5.4)
SODIUM SERPL-SCNC: 138 MMOL/L (ref 135–145)
WBC # BLD AUTO: 8.1 K/UL (ref 4.8–10.8)

## 2017-10-23 PROCEDURE — 80076 HEPATIC FUNCTION PANEL: CPT

## 2017-10-23 PROCEDURE — 80069 RENAL FUNCTION PANEL: CPT

## 2017-10-23 PROCEDURE — 99214 OFFICE O/P EST MOD 30 MIN: CPT | Performed by: INTERNAL MEDICINE

## 2017-10-23 PROCEDURE — 700111 HCHG RX REV CODE 636 W/ 250 OVERRIDE (IP): Performed by: INTERNAL MEDICINE

## 2017-10-23 PROCEDURE — 85025 COMPLETE CBC W/AUTO DIFF WBC: CPT

## 2017-10-23 PROCEDURE — 96365 THER/PROPH/DIAG IV INF INIT: CPT

## 2017-10-23 RX ORDER — ONDANSETRON 4 MG/1
4 TABLET, ORALLY DISINTEGRATING ORAL
Status: COMPLETED | OUTPATIENT
Start: 2017-10-23 | End: 2017-10-23

## 2017-10-23 RX ADMIN — ONDANSETRON 4 MG: 4 TABLET, ORALLY DISINTEGRATING ORAL at 15:28

## 2017-10-23 RX ADMIN — CEFTRIAXONE 2 G: 2 INJECTION, SOLUTION INTRAVENOUS at 15:38

## 2017-10-23 ASSESSMENT — PAIN SCALES - GENERAL: PAINLEVEL: 2=MINIMAL-SLIGHT

## 2017-10-23 NOTE — PROGRESS NOTES
Infectious Disease Follow up Note      Subjective:     Chief Complaint   Patient presents with   • Hospital Follow-up     Sepsis       Interval History:   32 yo woman recently hospitalized for emergent C section found to have group A strep infection and tubo-ovarian abscess. She is s/p TULIO-left oopherectomy on 10/11. Wound and Bcx grew GAS. Repeat BCx 10/11 Negative. Pt discharged on Ceftriaxone through 10/25 in addition to clindamycin.    Hospital records reviewed    Pt here for follow up of GAS sepsis. She is having sharp right sided abdominal pain for the past 4-5 days. She saw her OB-GYN last week and recommended ED eval and CT if worsens. Pt denies any fevers or chills but endorses fatigue and nausea with abx. She cannot take her percocet as it makes her anxious and paranoid. Taking tylenol PRN. Denies any diarrhea.    Past Medical History:   Diagnosis Date   • HPV in female     DX 2008   • Migraine    • Urinary tract infection, site not specified     bladder infections       Past Surgical History:   Procedure Laterality Date   • ABDOMINAL HYSTERECTOMY TOTAL  10/11/2017    Procedure: ABDOMINAL HYSTERECTOMY TOTAL;  Surgeon: Barbra Ulloa M.D.;  Location: [facility];  Service: Obstetrics   • OOPHORECTOMY Left 10/11/2017    Procedure: OOPHORECTOMY;  Surgeon: Barbra Ulloa M.D.;  Location: [facility];  Service: Obstetrics   • SALPINGECTOMY Bilateral 10/11/2017    Procedure: SALPINGECTOMY;  Surgeon: Barbra Ulloa M.D.;  Location: [facility];  Service: Obstetrics   • LYSIS ADHESIONS GYN  10/11/2017    Procedure: LYSIS ADHESIONS GYN;  Surgeon: Barbra Ulloa M.D.;  Location: [facility];  Service: Obstetrics   • PRIMARY C SECTION  9/1/2017    Procedure: PRIMARY C SECTION;  Surgeon: Barbra Ulloa M.D.;  Location: LABOR [facility] DELIVERY;  Service: Obstetrics       Allergies: No Known Allergies      Medications:  Current Outpatient Prescriptions on File Prior to Visit  "  Medication Sig Dispense Refill   • ondansetron (ZOFRAN ODT) 4 MG TABLET DISPERSIBLE Take 1 Tab by mouth every 8 hours as needed for Nausea/Vomiting. 30 Tab 0   • Milk Thistle 150 MG Cap Take  by mouth.     • Fenugreek 500 MG Cap Take  by mouth.     • ibuprofen (MOTRIN) 800 MG Tab Take 1 Tab by mouth every 8 hours as needed for Moderate Pain. 60 Tab 1   • ferrous sulfate 325 (65 Fe) MG tablet Take 1 Tab by mouth 2 Times a Day. 60 Tab 0   • amoxicillin-clavulanate (AUGMENTIN) 875-125 MG Tab Take 1 Tab by mouth 2 times a day. Start taking Augment after Clindamycin is completed. 14 Tab 0   • oxycodone-acetaminophen (PERCOCET-10)  MG Tab Take 1 Tab by mouth every four hours as needed for Severe Pain. 40 Tab 0   • bisacodyl EC (DULCOLAX) 5 MG Tablet Delayed Response Take 10 mg by mouth 1 time daily as needed for Constipation.     • Prenatal Vit-Fe Fumarate-FA (PRENATAL PO) Take 1 Tab by mouth every day.     • clindamycin (CLEOCIN) 300 MG Cap Take 1 Cap by mouth 4 times a day. 28 Cap 0   • gabapentin (NEURONTIN) 300 MG Cap Take 300-600 mg by mouth 2 times a day as needed.       Current Facility-Administered Medications on File Prior to Visit   Medication Dose Route Frequency Provider Last Rate Last Dose   • cefTRIAXone (ROCEPHIN) 2 g in 50 mL D5W IVPB premix  2 g Intravenous Q24HRS Esther Patterson M.D.       • ondansetron (ZOFRAN ODT) dispertab 4 mg  4 mg Oral Once PRN Esther Patterson M.D.             ROS  As documented above in my HPI       Objective:     PE:  /78   Pulse 78   Temp 37.1 °C (98.8 °F)   Ht 1.575 m (5' 2\")   Wt 92.6 kg (204 lb 3.2 oz)   SpO2 93%   BMI 37.35 kg/m²      Vital signs reviewed  Constitutional: patient is oriented to person, place, and time. Appears well-developed and well-nourished. No distress. Obese  Eyes: Conjunctivae normal and EOM are normal. Pupils are equal, round, and reactive to light.   Mouth/Throat: Lips without lesions, good dentition, oropharynx is clear and " moist.  Neck: Trachea midline. Normal range of motion. Neck supple. No masses  Cardiovascular: Normal rate, regular rhythm, normal heart sounds and intact distal pulses. No murmur, gallop, or friction rub. No edema.  Pulmonary/Chest: No respiratory distress. Unlabored respiratory effort, lungs clear to auscultation. No wheezes or rales.   Abdominal: Soft, non tender. BS + x 4. No masses or hepatosplenomegaly.   Musculoskeletal: Normal range of motion. No tenderness, swelling, erythema, deformity noted. RUE PICC nontender  Neurological: alert and oriented to person, place, and time. No cranial nerve deficit. Coordination normal. Moves all extremities  Skin: Skin is warm and dry. Good turgor. No rashes visable.  Psychiatric: Normal mood and affect. Behavior is normal.     LABS:  WBC   Date/Time Value Ref Range Status   10/16/2017 03:08 AM 12.9 (H) 4.8 - 10.8 K/uL Final     RBC   Date/Time Value Ref Range Status   10/16/2017 03:08 AM 2.66 (L) 4.20 - 5.40 M/uL Final     Hemoglobin   Date/Time Value Ref Range Status   10/16/2017 03:08 AM 8.0 (L) 12.0 - 16.0 g/dL Final     Hematocrit   Date/Time Value Ref Range Status   10/16/2017 03:08 AM 24.3 (L) 37.0 - 47.0 % Final     MCV   Date/Time Value Ref Range Status   10/16/2017 03:08 AM 91.4 81.4 - 97.8 fL Final     MCH   Date/Time Value Ref Range Status   10/16/2017 03:08 AM 30.1 27.0 - 33.0 pg Final     MCHC   Date/Time Value Ref Range Status   10/16/2017 03:08 AM 32.9 (L) 33.6 - 35.0 g/dL Final     MPV   Date/Time Value Ref Range Status   10/16/2017 03:08 AM 9.1 9.0 - 12.9 fL Final        Sodium   Date/Time Value Ref Range Status   10/17/2017 05:58  135 - 145 mmol/L Final     Potassium   Date/Time Value Ref Range Status   10/17/2017 05:58 PM 3.7 3.6 - 5.5 mmol/L Final     Chloride   Date/Time Value Ref Range Status   10/17/2017 05:58  96 - 112 mmol/L Final     Co2   Date/Time Value Ref Range Status   10/17/2017 05:58 PM 28 20 - 33 mmol/L Final     Glucose    Date/Time Value Ref Range Status   10/17/2017 05:58 PM 84 65 - 99 mg/dL Final     Bun   Date/Time Value Ref Range Status   10/17/2017 05:58 PM 9 8 - 22 mg/dL Final     Creatinine   Date/Time Value Ref Range Status   10/17/2017 05:58 PM 0.93 0.50 - 1.40 mg/dL Final   04/06/2017 0.6  Final     Alkaline Phosphatase   Date/Time Value Ref Range Status   10/17/2017 05:58 PM 76 30 - 99 U/L Final     AST(SGOT)   Date/Time Value Ref Range Status   10/17/2017 05:58 PM 13 12 - 45 U/L Final     ALT(SGPT)   Date/Time Value Ref Range Status   10/17/2017 05:58 PM 12 2 - 50 U/L Final     Total Bilirubin   Date/Time Value Ref Range Status   10/17/2017 05:58 PM 0.4 0.1 - 1.5 mg/dL Final        No results found for: CPKTOTAL     MICRO:  Blood Culture   Date Value Ref Range Status   10/11/2017 No growth after 5 days of incubation.  Final          IMAGING STUDIES:  CT a/p 10/11/17  Impression       1.  Post partum uterus with edema and/or hemorrhage in the LEFT adnexal region, as seen previously.  2.  Very small apparent fluid collections in the LEFT adnexal region suggesting developing abscesses, measuring up to 19 mm.  3.  Small amount of peritoneal free fluid present.  4.  No bowel obstruction or evidence for perforation.  5.  Mild RIGHT hydroureteronephrosis without obstructing stone, increased from prior exam.  6.  Appendix is not visualized.         Assessment/Plan:     Problem List Items Addressed This Visit     Abdominal pain      Other Visit Diagnoses     Sepsis due to group A Streptococcus (CMS-HCC)            Pt doing well but having RLQ pain. Will need to monitor closely  Complete CTX on 10/2517 and transition to PO augmentin for 2 weeks  DC PICC after last dose  Pt to complete clindamycin  If pain continues to worsen, pt advised to go to ED for further evaluation.      Follow up: 2 weeks, RTC sooner if needed. FU with PCP for ongoing chronic medical conditions.     Elizabeth Casas M.D.  10/23/2017

## 2017-10-23 NOTE — PROGRESS NOTES
Pt returns to infusion center for IV antibiotics as treatment for sepsis.  Zofran given as ordered prior to antibiotic infusion.  PICC line in place, brisk blood return noted.  Labs drawn as ordered.  Pt tolerated infusion without incident.  PICC line flushed with saline per policy, orange cap and gauze/net placed.  Pt left infusion center ambulatory and in good condition, returns daily.

## 2017-10-24 ENCOUNTER — OUTPATIENT INFUSION SERVICES (OUTPATIENT)
Dept: ONCOLOGY | Facility: MEDICAL CENTER | Age: 32
End: 2017-10-24
Attending: INTERNAL MEDICINE
Payer: COMMERCIAL

## 2017-10-24 VITALS
RESPIRATION RATE: 18 BRPM | HEART RATE: 75 BPM | BODY MASS INDEX: 37.37 KG/M2 | TEMPERATURE: 98 F | DIASTOLIC BLOOD PRESSURE: 78 MMHG | SYSTOLIC BLOOD PRESSURE: 125 MMHG | WEIGHT: 204.37 LBS | OXYGEN SATURATION: 98 %

## 2017-10-24 PROCEDURE — 96365 THER/PROPH/DIAG IV INF INIT: CPT

## 2017-10-24 PROCEDURE — 700111 HCHG RX REV CODE 636 W/ 250 OVERRIDE (IP)

## 2017-10-24 RX ORDER — ONDANSETRON 4 MG/1
4 TABLET, ORALLY DISINTEGRATING ORAL
Status: DISCONTINUED | OUTPATIENT
Start: 2017-10-24 | End: 2017-10-24 | Stop reason: HOSPADM

## 2017-10-24 RX ADMIN — CEFTRIAXONE 2 G: 2 INJECTION, SOLUTION INTRAVENOUS at 16:03

## 2017-10-24 ASSESSMENT — PAIN SCALES - GENERAL: PAINLEVEL: NO PAIN

## 2017-10-24 NOTE — PROGRESS NOTES
Pt presents for daily antibiotic, Rocephin.  She reports she is feeling much better.  Lab results from 10/23 reviewed with patient.  Pt happy tomorrow is her last day of IV antibiotics before she starts Augmentin at home.  RUE PICC line in place; brisk blood return noted.  Rocephin infused without complication.  PICC flushed, clave changed, and new swab cap applied.  Pt returns tomorrow Pt discharged from IS in NAD under self care.

## 2017-10-25 ENCOUNTER — OUTPATIENT INFUSION SERVICES (OUTPATIENT)
Dept: ONCOLOGY | Facility: MEDICAL CENTER | Age: 32
End: 2017-10-25
Attending: INTERNAL MEDICINE
Payer: COMMERCIAL

## 2017-10-25 VITALS
HEART RATE: 87 BPM | DIASTOLIC BLOOD PRESSURE: 79 MMHG | TEMPERATURE: 97.6 F | OXYGEN SATURATION: 95 % | RESPIRATION RATE: 18 BRPM | SYSTOLIC BLOOD PRESSURE: 117 MMHG

## 2017-10-25 PROCEDURE — 700111 HCHG RX REV CODE 636 W/ 250 OVERRIDE (IP)

## 2017-10-25 PROCEDURE — 306780 HCHG STAT FOR TRANSFUSION PER CASE

## 2017-10-25 PROCEDURE — 96365 THER/PROPH/DIAG IV INF INIT: CPT

## 2017-10-25 RX ADMIN — CEFTRIAXONE 2 G: 2 INJECTION, SOLUTION INTRAVENOUS at 16:03

## 2017-10-25 ASSESSMENT — PAIN SCALES - GENERAL: PAINLEVEL: NO PAIN

## 2017-11-01 ENCOUNTER — POST PARTUM (OUTPATIENT)
Dept: OBGYN | Facility: CLINIC | Age: 32
End: 2017-11-01
Payer: COMMERCIAL

## 2017-11-01 VITALS
BODY MASS INDEX: 37.54 KG/M2 | SYSTOLIC BLOOD PRESSURE: 122 MMHG | HEIGHT: 62 IN | WEIGHT: 204 LBS | DIASTOLIC BLOOD PRESSURE: 82 MMHG

## 2017-11-01 DIAGNOSIS — Z48.89 ENCOUNTER FOR POSTOPERATIVE WOUND CHECK: ICD-10-CM

## 2017-11-01 PROCEDURE — 99024 POSTOP FOLLOW-UP VISIT: CPT | Performed by: OBSTETRICS & GYNECOLOGY

## 2017-11-01 NOTE — LETTER
November 1, 2017       Patient: Murray Chavez   YOB: 1985   Date of Visit: 11/1/2017         To Whom It May Concern:    It is my medical opinion that Murray Chavez return to full duty, no restrictions starting 12/1/2017.    If you have any questions or concerns, please don't hesitate to call [phone]          Sincerely,          Barbra Ulloa M.D.  Electronically Signed

## 2017-11-01 NOTE — PROGRESS NOTES
"S: Murray presents for f/u after CM ANTUNEZ, HANNA for TOA and Group A strep sepsis on 10/11/17.  She is feeling much better this week.  She has stopped her narcotics.  She finished up her IV rocephin and is taking augmentin per ID recommendations.  She complains of abdominal pain 45 minutes after eating and with bowel movements, which is described as severe, but resolves spontaneously about 5-10 minutes after onset.  Denies vomiting, fevers.  C/o feeling hot at night.  Migraines have resolved.  Denies vaginal bleeding or vaginal discharge.     O:Blood pressure 122/82, height 1.575 m (5' 2\"), weight 92.5 kg (204 lb), currently breastfeeding.  GENERAL: Alert, in no apparent distress  PSYCHIATRIC: Appropriate affect, intact insight and judgement.  ABDOMEN: Soft, mildly tender in RLQ, nondistended.  No palpable masses.  No rebound or guarding.  No inguinal lymphadenopathy.  No hepatosplenomegaly.  No hernias.  BACK: No CVA tenderness  EXTREMITIES: No edema  SKIN: No rash      GENITOURINARY:  Normal external genitalia, no lesions.  Normal urethral meatus, no masses or tenderness.  Normal bladder without fullness or masses.  Vagina well estrogenized, no vaginal discharge or lesions.  A small amount of cervix is visualized. The cuff apex is tender on bimanual exam.   Uterus absent  Adnexa nontender, no masses.   Normal anus and perineum.    Rectal Exam - not indicated.    Labs reviewed.  Anemia recovering well.     A/P: S/P TULIO, CM, HANNA for TOA and group A strep sepsis. Clinical status improving.  Continue course of Augmentin. Recommend 4 more weeks of recovery before returning to work.    F/U 4 weeks.   "

## 2017-11-02 ENCOUNTER — TELEPHONE (OUTPATIENT)
Dept: OBGYN | Facility: CLINIC | Age: 32
End: 2017-11-02

## 2017-11-14 ENCOUNTER — OFFICE VISIT (OUTPATIENT)
Dept: INFECTIOUS DISEASES | Facility: MEDICAL CENTER | Age: 32
End: 2017-11-14
Payer: COMMERCIAL

## 2017-11-14 VITALS
BODY MASS INDEX: 37.91 KG/M2 | WEIGHT: 206 LBS | HEIGHT: 62 IN | DIASTOLIC BLOOD PRESSURE: 78 MMHG | HEART RATE: 89 BPM | OXYGEN SATURATION: 95 % | TEMPERATURE: 97.8 F | SYSTOLIC BLOOD PRESSURE: 120 MMHG

## 2017-11-14 DIAGNOSIS — T81.40XS POSTOPERATIVE INFECTION, SEQUELA: ICD-10-CM

## 2017-11-14 DIAGNOSIS — N71.9 ENDOMETRITIS: ICD-10-CM

## 2017-11-14 DIAGNOSIS — A40.0: ICD-10-CM

## 2017-11-14 PROCEDURE — 99213 OFFICE O/P EST LOW 20 MIN: CPT | Performed by: INTERNAL MEDICINE

## 2017-11-14 NOTE — PROGRESS NOTES
Chief Complaint   Patient presents with   • Follow-Up     Sepsis due to group A Streptococcus        Infectious Disease clinic follow-up:  Patient is a 31 y.o. female in the clinic today for ID FU appointment.     Primary Care Provider: Ayesha Magana M.D.   30 yo woman recently hospitalized for emergent C section found to have group A strep infection and tubo-ovarian abscess. She is s/p TULIO-left oopherectomy on 10/11. Wound and Bcx grew GAS. Repeat BCx 10/11 Negative. Pt discharged on Ceftriaxone through 10/25 in addition to clindamycin.     Hospital records reviewed      10/23/17-Pt here for follow up of GAS sepsis. She is having sharp right sided abdominal pain for the past 4-5 days. She saw her OB-GYN last week and recommended ED eval and CT if worsens. Pt denies any fevers or chills but endorses fatigue and nausea with abx. She cannot take her percocet as it makes her anxious and paranoid. Taking tylenol PRN. Denies any diarrhea.  11/14/17- feels much better. Finished her abx 1.5 weeks ago. Feels   REVIEW OF SYSTEMS:    Constitutional: Negative for fever and malaise/fatigue.   HENT: Negative for hearing loss and visual changes   Eyes: Negative for blurred vision, double vision and photophobia.   Respiratory: Negative for cough.   Cardiovascular: Negative for chest pain and leg swelling.   Gastrointestinal: still some nausea and some abdominal pain off and on. Improved since off abx  Musculoskeletal: Negative for myalgias. Some back pain off and on  Skin: Negative for rash.   Neurological: Negative for sensory change, focal weakness and headaches.     ALLERGIES:  No Known Allergies     PAST MEDICAL HISTORY:   Past Medical History:   Diagnosis Date   • HPV in female     DX 2008   • Migraine    • Urinary tract infection, site not specified     bladder infections       PAST SURGICAL HISTORY:    Past Surgical History:   Procedure Laterality Date   • ABDOMINAL HYSTERECTOMY TOTAL  10/11/2017    Procedure: ABDOMINAL  HYSTERECTOMY TOTAL;  Surgeon: Barbra Ulloa M.D.;  Location: SURGERY [facility];  Service: Obstetrics   • OOPHORECTOMY Left 10/11/2017    Procedure: OOPHORECTOMY;  Surgeon: Barbra Ulloa M.D.;  Location: SURGERY [facility];  Service: Obstetrics   • SALPINGECTOMY Bilateral 10/11/2017    Procedure: SALPINGECTOMY;  Surgeon: Barbra Ulloa M.D.;  Location: SURGERY [facility];  Service: Obstetrics   • LYSIS ADHESIONS GYN  10/11/2017    Procedure: LYSIS ADHESIONS GYN;  Surgeon: Barbra Ulloa M.D.;  Location: SURGERY [facility];  Service: Obstetrics   • PRIMARY C SECTION  9/1/2017    Procedure: PRIMARY C SECTION;  Surgeon: Barbra Ulloa M.D.;  Location: LABOR AND DELIVERY;  Service: Obstetrics        MEDICATIONS:   Current Outpatient Prescriptions   Medication Sig Dispense Refill   • Milk Thistle 150 MG Cap Take  by mouth.     • Fenugreek 500 MG Cap Take  by mouth.     • ibuprofen (MOTRIN) 800 MG Tab Take 1 Tab by mouth every 8 hours as needed for Moderate Pain. 60 Tab 1   • ferrous sulfate 325 (65 Fe) MG tablet Take 1 Tab by mouth 2 Times a Day. (Patient taking differently: Take 325 mg by mouth every day.) 60 Tab 0   • Prenatal Vit-Fe Fumarate-FA (PRENATAL PO) Take 1 Tab by mouth every day.     • ondansetron (ZOFRAN ODT) 4 MG TABLET DISPERSIBLE Take 1 Tab by mouth every 8 hours as needed for Nausea/Vomiting. 30 Tab 0   • clindamycin (CLEOCIN) 300 MG Cap Take 1 Cap by mouth 4 times a day. 28 Cap 0   • amoxicillin-clavulanate (AUGMENTIN) 875-125 MG Tab Take 1 Tab by mouth 2 times a day. Start taking Augment after Clindamycin is completed. 14 Tab 0   • oxycodone-acetaminophen (PERCOCET-10)  MG Tab Take 1 Tab by mouth every four hours as needed for Severe Pain. 40 Tab 0   • bisacodyl EC (DULCOLAX) 5 MG Tablet Delayed Response Take 10 mg by mouth 1 time daily as needed for Constipation.     • gabapentin (NEURONTIN) 300 MG Cap Take 300-600 mg by mouth 2 times a day as needed.       No current  "facility-administered medications for this visit.         LABORATORY DATA:       PHYSICAL EXAMINATION:PE:      /78   Pulse 89   Temp 36.6 °C (97.8 °F)   Ht 1.575 m (5' 2\")   Wt 93.4 kg (206 lb)   SpO2 95%   BMI 37.68 kg/m²        Constitutional: patient is oriented to person, place, and time. He appears well-developed and well-nourished. No distress  Eyes: Conjunctivae normal and EOM are normal. Pupils are equal, round, and reactive to light.   Mouth/Throat: Lips without lesions, good dentition, oropharynx is clear and moist.  Neck: Trachea midline. Normal range of motion. Neck supple. No masses  Cardiovascular: Normal rate, regular rhythm, normal heart sounds and intact distal pulses. No murmur, gallop, or friction rub. No edema.  Pulmonary/Chest: No respiratory distress. Unlabored respiratory effort, lungs clear to auscultation. No wheezes or rales.   Abdominal: Soft, non tender. BS + x 4. No masses or hepatosplenomegaly. No vaginal drainage.  Musculoskeletal: Normal range of motion. No tenderness, swelling, erythema, deformity noted.  Neurological: He is alert and oriented to person, place, and time. No cranial nerve deficit. Coordination normal.   Skin: Skin is warm and dry. Good turgor. No rashes visable.  Psychiatric: He has a normal mood and affect. His behavior is normal.        ASSESSMENT:  1. Beta-hemolytic group A streptococcal sepsis (CMS-HCC)     2. Postoperative infection, sequela     3. Endometritis          RECOMMENDATIONS:      No further abx needed. Continue follow up with Gyn. We will d/c her from the clinic.    No Follow-up on file.  "

## 2018-01-11 ENCOUNTER — TELEPHONE (OUTPATIENT)
Dept: OBGYN | Facility: MEDICAL CENTER | Age: 33
End: 2018-01-11

## 2019-02-18 NOTE — CARE PLAN
Problem: Safety  Goal: Will remain free from injury  Outcome: PROGRESSING AS EXPECTED  Patient educated on strict NPO status prior to surgery.      Problem: Pain Management  Goal: Pain level will decrease to patient's comfort goal  Outcome: PROGRESSING AS EXPECTED  Patient having complaints of pain 8/10, medicated per MAR.         To go home today

## 2020-03-11 NOTE — PROGRESS NOTES
31 y.o.  38w1d The patient is here for routine obstetrical followup. She reports good fetal movement. She denies regular contractions, vaginal bleeding, or leaking of fluid.  She reports a severe  Migraine for 3 days despite tylenol and gabapentin use.     The patient's pregnancy is complicated by   Patient Active Problem List    Diagnosis Date Noted   • Supervision of normal intrauterine pregnancy in multigravida in third trimester 2017   • Migraines 2017   • Gestational hypertension without significant proteinuria 2017   BP normal  Cx - extremely posterior, unable to reach fetal head, soft.  Position  - ceph by US.    Gave patient the option of percocet today and rest at home vs. L&D.  She would like to go to L&D.  Plan for 1 Liter LR hydration and Compazine 10 mg IV to break headache.  She is scheduled for elective IOL on 17.    Followup in 1 week  Labor precautions were discussed with patient  Fetal kick counts were discussed with patient     Closure 3 Information: This tab is for additional flaps and grafts above and beyond our usual structured repairs.  Please note if you enter information here it will not currently bill and you will need to add the billing information manually.

## 2022-01-04 NOTE — TELEPHONE ENCOUNTER
"Pt called in c/o tachycardia (130s) \"pounding out of chest\"   Consistent & more (pt does travel a lot)    After 9am - return call or after 330         " Prep note by tomas Arevalo  Subjective   Above noted.  ABG on 1/3/2022: pH 7.38, PCO2 49, PO2 65  Subjective    No acute respiratory complaints reported.Less dyspnea and wheezing Reported.  Review of Systems     Objective     Objective   Visit Vitals  /63 (BP Location: LUE - Left upper extremity, Patient Position: Supine)   Pulse 73   Temp 98.2 °F (36.8 °C) (Oral)   Resp 18   Ht 5' 8\" (1.727 m)   Wt (!) 168.7 kg (372 lb)   SpO2 93%   BMI 56.56 kg/m²     Oxygen flowing at 3 L/min    Physical Exam   Decreased wheezing.  I/O's       Intake/Output Summary (Last 24 hours) at 1/4/2022 1506  Last data filed at 1/4/2022 0500  Gross per 24 hour   Intake --   Output 975 ml   Net -975 ml         Labs     Recent Labs   Lab 01/04/22  0412 01/02/22  0502 01/01/22  1508   WBC 9.2 11.8* 11.7*   HCT 43.9 50.4 52.7*   HGB 13.8 16.0 17.2*   * 150 179     Recent Labs   Lab 01/04/22  0412 01/03/22  0404 01/02/22  0502   SODIUM 139 139 137   POTASSIUM 4.6 4.2 4.6   CHLORIDE 107 108* 108*   CO2 26 28 25   GLUCOSE 136* 99 105*   BUN 21* 25* 27*   CREATININE 0.88 0.94 1.06         Imaging     XR CHEST PA OR AP 1 VIEW   Final Result      Similar appearance of decreased lung volumes without radiographic evidence   of an acute cardiopulmonary process.         Electronically Signed by: BHUMI REYNOLDS M.D.    Signed on: 1/3/2022 9:24 PM          CT ABDOMEN PELVIS W CONTRAST   Final Result   1.  Extensive peripancreatic retroperitoneal stranding/inflammation without   focal fluid collection to suggest abscess or pseudocyst formation.    Findings consistent with acute pancreatitis.   2.  Cholelithiasis.   3.  Hepatomegaly with findings of hepatic steatosis.        Further evaluation with MRCP may be warranted.      Electronically Signed by: BRANDON CANCINO M.D.    Signed on: 1/3/2022 11:02 AM          XR CHEST PA OR AP 1 VIEW   Final Result   1.  Poor lung expansion.  A few bibasilar linear opacities suggest   atelectasis.  No  focal infiltrate noted at this time.  Follow-up can be   obtained as clinically directed.      **The absence of findings should not deter follow-up or further evaluation   of concerning clinical findings.      FOR PHYSICIAN USE ONLY: Please note that this report was generated using   voice recognition software.  If you require clarification or feel that   there is an error in this report, please contact me.      Electronically Signed by: MARKY GROVE M.D.    Signed on: 1/2/2022 12:30 PM          US LIVER GALLBLADDER PANCREAS   Final Result   Extremely limited/ fairly nondiagnostic exam due to patient   body habitus and extensive bowel gas.      Electronically Signed by: JORDYN WILLIS M.D.    Signed on: 1/1/2022 5:35 PM            ABG on 1/3/2022: pH 7.38, PCO2 49, PO2 65    Diagnosis   COPD exacerbation   Hypoxemia   Tobacco use disorder  Unspecified sleep apnea      Alcohol-induced acute pancreatitis  Plan   BiPAP during sleep and as needed; outpatient evaluation for sleep apnea     Short course of systemic steroids  Bronchodilators by nebulizer     Keep O2 saturation 92% to 95%  O2 assessment prior to discharge  DVT Prophylaxis.  Inpatient Medications     Current Facility-Administered Medications   Medication   • ipratropium-albuterol (DUONEB) 0.5-2.5 (3) MG/3ML nebulizer solution 3 mL   • albuterol (VENTOLIN) nebulizer 2.5 mg   • methylPREDNISolone (SOLU-Medrol) PF injection 60 mg   • enoxaparin (LOVENOX) injection 80 mg   • buPROPion XL (WELLBUTRIN XL) tablet 150 mg   • labetalol (NORMODYNE) tablet 100 mg   • losartan (COZAAR) tablet 100 mg   • meloxicam (MOBIC) tablet 15 mg   • [Held by provider] spironolactone (ALDACTONE) tablet 25 mg   • aspirin (ECOTRIN) enteric coated tablet 81 mg   • morphine injection 4 mg   • morphine injection 2 mg   • ondansetron (ZOFRAN) injection 4 mg     Charting performed by tomas Arevalo for Dr. Bar Choudhary.     All medical record entries made by the tomas were at my  direction. I have reviewed the chart and agree that the record accurately reflects my personal performance of the history, physical exam, hospital course, and assessment and plan.

## 2022-02-03 ENCOUNTER — HOSPITAL ENCOUNTER (OUTPATIENT)
Dept: LAB | Facility: MEDICAL CENTER | Age: 37
End: 2022-02-03
Attending: FAMILY MEDICINE
Payer: COMMERCIAL

## 2022-02-03 LAB
ALBUMIN SERPL BCP-MCNC: 4.3 G/DL (ref 3.2–4.9)
ALBUMIN/GLOB SERPL: 1.3 G/DL
ALP SERPL-CCNC: 81 U/L (ref 30–99)
ALT SERPL-CCNC: 11 U/L (ref 2–50)
ANION GAP SERPL CALC-SCNC: 10 MMOL/L (ref 7–16)
AST SERPL-CCNC: 19 U/L (ref 12–45)
BASOPHILS # BLD AUTO: 0.8 % (ref 0–1.8)
BASOPHILS # BLD: 0.06 K/UL (ref 0–0.12)
BILIRUB SERPL-MCNC: 0.4 MG/DL (ref 0.1–1.5)
BUN SERPL-MCNC: 8 MG/DL (ref 8–22)
CALCIUM SERPL-MCNC: 9.5 MG/DL (ref 8.5–10.5)
CHLORIDE SERPL-SCNC: 107 MMOL/L (ref 96–112)
CHOLEST SERPL-MCNC: 257 MG/DL (ref 100–199)
CO2 SERPL-SCNC: 22 MMOL/L (ref 20–33)
CREAT SERPL-MCNC: 0.9 MG/DL (ref 0.5–1.4)
EOSINOPHIL # BLD AUTO: 0.17 K/UL (ref 0–0.51)
EOSINOPHIL NFR BLD: 2.2 % (ref 0–6.9)
ERYTHROCYTE [DISTWIDTH] IN BLOOD BY AUTOMATED COUNT: 45.5 FL (ref 35.9–50)
FASTING STATUS PATIENT QL REPORTED: NORMAL
GLOBULIN SER CALC-MCNC: 3.3 G/DL (ref 1.9–3.5)
GLUCOSE SERPL-MCNC: 86 MG/DL (ref 65–99)
HCT VFR BLD AUTO: 42.4 % (ref 37–47)
HDLC SERPL-MCNC: 56 MG/DL
HGB BLD-MCNC: 14 G/DL (ref 12–16)
IMM GRANULOCYTES # BLD AUTO: 0.02 K/UL (ref 0–0.11)
IMM GRANULOCYTES NFR BLD AUTO: 0.3 % (ref 0–0.9)
LDLC SERPL CALC-MCNC: 165 MG/DL
LYMPHOCYTES # BLD AUTO: 2.36 K/UL (ref 1–4.8)
LYMPHOCYTES NFR BLD: 30.9 % (ref 22–41)
MCH RBC QN AUTO: 31.3 PG (ref 27–33)
MCHC RBC AUTO-ENTMCNC: 33 G/DL (ref 33.6–35)
MCV RBC AUTO: 94.6 FL (ref 81.4–97.8)
MONOCYTES # BLD AUTO: 0.65 K/UL (ref 0–0.85)
MONOCYTES NFR BLD AUTO: 8.5 % (ref 0–13.4)
NEUTROPHILS # BLD AUTO: 4.37 K/UL (ref 2–7.15)
NEUTROPHILS NFR BLD: 57.3 % (ref 44–72)
NRBC # BLD AUTO: 0 K/UL
NRBC BLD-RTO: 0 /100 WBC
PLATELET # BLD AUTO: 271 K/UL (ref 164–446)
PMV BLD AUTO: 10.5 FL (ref 9–12.9)
POTASSIUM SERPL-SCNC: 4.4 MMOL/L (ref 3.6–5.5)
PROT SERPL-MCNC: 7.6 G/DL (ref 6–8.2)
RBC # BLD AUTO: 4.48 M/UL (ref 4.2–5.4)
SODIUM SERPL-SCNC: 139 MMOL/L (ref 135–145)
T3 SERPL-MCNC: 103 NG/DL (ref 60–181)
T4 SERPL-MCNC: 6 UG/DL (ref 4–12)
TRIGL SERPL-MCNC: 178 MG/DL (ref 0–149)
TSH SERPL DL<=0.005 MIU/L-ACNC: 4.75 UIU/ML (ref 0.38–5.33)
WBC # BLD AUTO: 7.6 K/UL (ref 4.8–10.8)

## 2022-02-03 PROCEDURE — 84436 ASSAY OF TOTAL THYROXINE: CPT

## 2022-02-03 PROCEDURE — 80053 COMPREHEN METABOLIC PANEL: CPT

## 2022-02-03 PROCEDURE — 80061 LIPID PANEL: CPT

## 2022-02-03 PROCEDURE — 85025 COMPLETE CBC W/AUTO DIFF WBC: CPT

## 2022-02-03 PROCEDURE — 36415 COLL VENOUS BLD VENIPUNCTURE: CPT

## 2022-02-03 PROCEDURE — 84480 ASSAY TRIIODOTHYRONINE (T3): CPT

## 2022-02-03 PROCEDURE — 84443 ASSAY THYROID STIM HORMONE: CPT

## 2023-04-22 ENCOUNTER — HOSPITAL ENCOUNTER (OUTPATIENT)
Dept: LAB | Facility: MEDICAL CENTER | Age: 38
End: 2023-04-22
Attending: FAMILY MEDICINE
Payer: MEDICAID

## 2023-04-22 LAB
ALBUMIN SERPL BCP-MCNC: 4.1 G/DL (ref 3.2–4.9)
ALBUMIN/GLOB SERPL: 1.3 G/DL
ALP SERPL-CCNC: 66 U/L (ref 30–99)
ALT SERPL-CCNC: 19 U/L (ref 2–50)
ANION GAP SERPL CALC-SCNC: 10 MMOL/L (ref 7–16)
AST SERPL-CCNC: 18 U/L (ref 12–45)
BASOPHILS # BLD AUTO: 0.6 % (ref 0–1.8)
BASOPHILS # BLD: 0.05 K/UL (ref 0–0.12)
BILIRUB SERPL-MCNC: 0.6 MG/DL (ref 0.1–1.5)
BUN SERPL-MCNC: 13 MG/DL (ref 8–22)
CALCIUM ALBUM COR SERPL-MCNC: 9.3 MG/DL (ref 8.5–10.5)
CALCIUM SERPL-MCNC: 9.4 MG/DL (ref 8.5–10.5)
CHLORIDE SERPL-SCNC: 108 MMOL/L (ref 96–112)
CHOLEST SERPL-MCNC: 266 MG/DL (ref 100–199)
CO2 SERPL-SCNC: 24 MMOL/L (ref 20–33)
CREAT SERPL-MCNC: 0.85 MG/DL (ref 0.5–1.4)
CRP SERPL HS-MCNC: <0.3 MG/DL (ref 0–0.75)
EOSINOPHIL # BLD AUTO: 0.13 K/UL (ref 0–0.51)
EOSINOPHIL NFR BLD: 1.5 % (ref 0–6.9)
ERYTHROCYTE [DISTWIDTH] IN BLOOD BY AUTOMATED COUNT: 45.3 FL (ref 35.9–50)
ERYTHROCYTE [SEDIMENTATION RATE] IN BLOOD BY WESTERGREN METHOD: 8 MM/HOUR (ref 0–25)
FASTING STATUS PATIENT QL REPORTED: NORMAL
GFR SERPLBLD CREATININE-BSD FMLA CKD-EPI: 91 ML/MIN/1.73 M 2
GLOBULIN SER CALC-MCNC: 3.1 G/DL (ref 1.9–3.5)
GLUCOSE SERPL-MCNC: 93 MG/DL (ref 65–99)
HCT VFR BLD AUTO: 42.7 % (ref 37–47)
HDLC SERPL-MCNC: 50 MG/DL
HGB BLD-MCNC: 14.4 G/DL (ref 12–16)
IMM GRANULOCYTES # BLD AUTO: 0.02 K/UL (ref 0–0.11)
IMM GRANULOCYTES NFR BLD AUTO: 0.2 % (ref 0–0.9)
LDLC SERPL CALC-MCNC: 192 MG/DL
LYMPHOCYTES # BLD AUTO: 2.44 K/UL (ref 1–4.8)
LYMPHOCYTES NFR BLD: 27.6 % (ref 22–41)
MCH RBC QN AUTO: 31.3 PG (ref 27–33)
MCHC RBC AUTO-ENTMCNC: 33.7 G/DL (ref 33.6–35)
MCV RBC AUTO: 92.8 FL (ref 81.4–97.8)
MONOCYTES # BLD AUTO: 0.77 K/UL (ref 0–0.85)
MONOCYTES NFR BLD AUTO: 8.7 % (ref 0–13.4)
NEUTROPHILS # BLD AUTO: 5.44 K/UL (ref 2–7.15)
NEUTROPHILS NFR BLD: 61.4 % (ref 44–72)
NRBC # BLD AUTO: 0 K/UL
NRBC BLD-RTO: 0 /100 WBC
PLATELET # BLD AUTO: 269 K/UL (ref 164–446)
PMV BLD AUTO: 10.7 FL (ref 9–12.9)
POTASSIUM SERPL-SCNC: 4.7 MMOL/L (ref 3.6–5.5)
PROT SERPL-MCNC: 7.2 G/DL (ref 6–8.2)
RBC # BLD AUTO: 4.6 M/UL (ref 4.2–5.4)
SODIUM SERPL-SCNC: 142 MMOL/L (ref 135–145)
TRIGL SERPL-MCNC: 121 MG/DL (ref 0–149)
TSH SERPL DL<=0.005 MIU/L-ACNC: 2.73 UIU/ML (ref 0.38–5.33)
WBC # BLD AUTO: 8.9 K/UL (ref 4.8–10.8)

## 2023-04-22 PROCEDURE — 80053 COMPREHEN METABOLIC PANEL: CPT

## 2023-04-22 PROCEDURE — 86140 C-REACTIVE PROTEIN: CPT

## 2023-04-22 PROCEDURE — 85025 COMPLETE CBC W/AUTO DIFF WBC: CPT

## 2023-04-22 PROCEDURE — 84443 ASSAY THYROID STIM HORMONE: CPT

## 2023-04-22 PROCEDURE — 85652 RBC SED RATE AUTOMATED: CPT

## 2023-04-22 PROCEDURE — 80061 LIPID PANEL: CPT

## 2023-04-22 PROCEDURE — 36415 COLL VENOUS BLD VENIPUNCTURE: CPT

## 2023-08-10 NOTE — PROGRESS NOTES
1924- Dr. Ulloa at bedside, SVE 4/75/ballotable, orders received to keep increasing on pitocin. Pt educated on why MD is unable to rupture membranes, pt verbalizes understanding, all questions were addressed by MD. Sneha BOUCHER updated.    Quality 130: Documentation Of Current Medications In The Medical Record: Current Medications Documented Detail Level: Detailed Quality 226: Preventive Care And Screening: Tobacco Use: Screening And Cessation Intervention: Patient screened for tobacco use and is an ex/non-smoker

## 2024-01-08 ENCOUNTER — APPOINTMENT (OUTPATIENT)
Dept: RADIOLOGY | Facility: IMAGING CENTER | Age: 39
End: 2024-01-08
Payer: COMMERCIAL

## 2024-01-08 ENCOUNTER — OCCUPATIONAL MEDICINE (OUTPATIENT)
Dept: URGENT CARE | Facility: PHYSICIAN GROUP | Age: 39
End: 2024-01-08
Payer: COMMERCIAL

## 2024-01-08 VITALS
DIASTOLIC BLOOD PRESSURE: 80 MMHG | HEART RATE: 80 BPM | OXYGEN SATURATION: 98 % | HEIGHT: 62 IN | TEMPERATURE: 98.4 F | RESPIRATION RATE: 14 BRPM | WEIGHT: 264 LBS | BODY MASS INDEX: 48.58 KG/M2 | SYSTOLIC BLOOD PRESSURE: 114 MMHG

## 2024-01-08 DIAGNOSIS — M25.532 LEFT WRIST PAIN: ICD-10-CM

## 2024-01-08 DIAGNOSIS — S46.912A STRAIN OF LEFT SHOULDER, INITIAL ENCOUNTER: ICD-10-CM

## 2024-01-08 DIAGNOSIS — S66.912A STRAIN OF LEFT WRIST, INITIAL ENCOUNTER: ICD-10-CM
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
## 2024-01-08 DIAGNOSIS — M25.512 ACUTE PAIN OF LEFT SHOULDER: ICD-10-CM

## 2024-01-08 PROCEDURE — 73110 X-RAY EXAM OF WRIST: CPT | Mod: TC,FY,LT

## 2024-01-08 PROCEDURE — 3079F DIAST BP 80-89 MM HG: CPT

## 2024-01-08 PROCEDURE — 3074F SYST BP LT 130 MM HG: CPT

## 2024-01-08 PROCEDURE — 1125F AMNT PAIN NOTED PAIN PRSNT: CPT

## 2024-01-08 PROCEDURE — 99203 OFFICE O/P NEW LOW 30 MIN: CPT

## 2024-01-08 PROCEDURE — 73030 X-RAY EXAM OF SHOULDER: CPT | Mod: TC,FY,LT

## 2024-01-08 RX ORDER — KETOROLAC TROMETHAMINE 30 MG/ML
30 INJECTION, SOLUTION INTRAMUSCULAR; INTRAVENOUS ONCE
Status: COMPLETED | OUTPATIENT
Start: 2024-01-08 | End: 2024-01-08

## 2024-01-08 RX ORDER — CYCLOBENZAPRINE HCL 5 MG
5-10 TABLET ORAL 3 TIMES DAILY PRN
Qty: 20 TABLET | Refills: 0 | Status: SHIPPED | OUTPATIENT
Start: 2024-01-08

## 2024-01-08 RX ORDER — BUPROPION HYDROCHLORIDE 150 MG/1
150 TABLET ORAL EVERY 24 HOURS
COMMUNITY
Start: 2023-12-07

## 2024-01-08 RX ORDER — CITALOPRAM 20 MG/1
20 TABLET ORAL DAILY
COMMUNITY
Start: 2023-12-10

## 2024-01-08 RX ADMIN — KETOROLAC TROMETHAMINE 30 MG: 30 INJECTION, SOLUTION INTRAMUSCULAR; INTRAVENOUS at 13:10

## 2024-01-08 ASSESSMENT — ENCOUNTER SYMPTOMS
FALLS: 1
LOSS OF CONSCIOUSNESS: 0
TINGLING: 1

## 2024-01-08 ASSESSMENT — FIBROSIS 4 INDEX: FIB4 SCORE: 0.55

## 2024-01-08 ASSESSMENT — PAIN SCALES - GENERAL: PAINLEVEL: 6=MODERATE PAIN

## 2024-01-08 NOTE — PROGRESS NOTES
"Subjective:   Murray Chavez is a 38 y.o. 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  She reports falling on her left wrist and shoulder.  She reports pain is 6-7\10.  She reports tingling in her fingertips.  She denies hitting her head or any loss of consciousness.  No pre-existing injuries to her left wrist or shoulder.          Review of Systems   Musculoskeletal:  Positive for falls.        + Left wrist and left shoulder pain   Neurological:  Positive for tingling. Negative for loss of consciousness.       Problem list, medications, and allergies reviewed by myself today in Epic.     Objective:     /80   Pulse 80   Temp 36.9 °C (98.4 °F) (Temporal)   Resp 14   Ht 1.575 m (5' 2\")   Wt 120 kg (264 lb)   SpO2 98%   BMI 48.29 kg/m²     Physical Exam  Vitals and nursing note reviewed.   HENT:      Head: Normocephalic and atraumatic.   Musculoskeletal:      Left shoulder: Tenderness present. No swelling, deformity, effusion, laceration or bony tenderness.      Left wrist: Swelling and tenderness present. No deformity, effusion or lacerations. Decreased range of motion.      Comments: Left wrist: + Tenderness over radial aspect of wrist,+ mild swelling, + decreased range of motion secondary to pain.     Left shoulder: No obvious deformity , full range of motion with pain, + muscular tenderness   Skin:     General: Skin is warm and dry.      Capillary Refill: Capillary refill takes less than 2 seconds.         Assessment/Plan:     Diagnosis and associated orders:   1. Fall, initial encounter        2. Strain of left wrist, initial encounter  DX-WRIST-COMPLETE 3+ LEFT    ketorolac (Toradol) injection 30 mg    cyclobenzaprine (FLEXERIL) 5 mg tablet      3. Strain of left shoulder, initial encounter  DX-SHOULDER 2+ LEFT    ketorolac (Toradol) injection 30 mg "    cyclobenzaprine (FLEXERIL) 5 mg tablet             Comments/MDM:   Pt is clinically stable at today's acute urgent care visit.  No acute distress noted. Appropriate for outpatient management at this time.     Acute problem  DX left wrist and shoulder negative for acute fracture or dislocation  Work restrictions per D39  Flexeril as prescribed; medication should not be used while working or driving  Alternate Tylenol and ibuprofen as needed for pain  Alternate heat and ice application  Wrist brace for comfort  Return in 3 days for reevaluation           Discussed DDx, management options (risks,benefits, and alternatives to planned treatment), natural progression and supportive care.  Expressed understanding and the treatment plan was agreed upon. Questions were encouraged and answered   Return to urgent care prn if new or worsening sx or if there is no improvement in condition prn.    Educated in Red flags and indications to immediately call 911 or present to the Emergency Department.   Advised the patient to follow-up with the primary care physician for recheck, reevaluation, and consideration of further management.    I personally reviewed prior external notes and test results pertinent to today's visit.  I have independently reviewed and interpreted all diagnostics ordered during this urgent care acute visit.       Please note that this dictation was created using voice recognition software. I have made a reasonable attempt to correct obvious errors, but I expect that there are errors of grammar and possibly content that I did not discover before finalizing the note.    This note was electronically signed by ALFONSO Benitez

## 2024-01-08 NOTE — LETTER
[facility] [place]  [address] [place] [address] [place] [address]  Phone:  [phone] - Fax:  [phone]   [facility] Progress Report and Disability Certification  Date of Service: 1/8/2024   No Show:  No  Date / Time of Next Visit: 1/11/2024   Claim Information   Patient Name: Murray Chavez  Claim Number:     Employer: [employer]  Date of Injury: 1/8/2024     Insurer / TPA: [employer]  ID / SSN:     Occupation: TEACHER  Diagnosis: Diagnoses of Fall, initial encounter, Strain of left wrist, initial encounter, and Strain of left shoulder, initial encounter were pertinent to this visit.    Medical Information   Related to Industrial Injury? 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  She reports falling on her left wrist and shoulder.  She reports pain is 6-7\10.  She reports tingling in her fingertips.  She denies hitting her head or any loss of consciousness.  No pre-existing injuries to her left wrist or shoulder.          Review of Systems   Musculoskeletal:  Positive for falls.        + Left wrist and left shoulder pain   Neurological:  Positive for tingling. Negative for loss of consciousness.        Objective Findings: Physical Exam  Vitals and nursing note reviewed.   HENT:      Head: Normocephalic and atraumatic.   Musculoskeletal:      Left shoulder: Tenderness present. No swelling, deformity, effusion, laceration or bony tenderness.      Left wrist: Swelling and tenderness present. No deformity, effusion or lacerations. Decreased range of motion.      Comments: Left wrist: + Tenderness over radial aspect of wrist,+ mild swelling, + decreased range of motion secondary to pain.     Left shoulder: No obvious deformity , full range of motion with pain, + muscular tenderness   Skin:     General: Skin is warm and dry.      Capillary Refill:  Capillary refill takes less than 2 seconds.        Pre-Existing Condition(s):     Assessment:   Initial Visit    Status: Additional Care Required  Permanent Disability:No    Plan: Medication    Diagnostics: X-ray    Comments:       Disability Information   Status: Released to Restricted Duty    From:  1/8/2024  Through: 1/11/2024 Restrictions are: Temporary   Physical Restrictions   Sitting:    Standing:    Stooping:    Bending:      Squatting:    Walking:    Climbing:    Pushing:      Pulling:    Other:    Reaching Above Shoulder (L):   Reaching Above Shoulder (R):       Reaching Below Shoulder (L):    Reaching Below Shoulder (R):      Not to exceed Weight Limits   Carrying(hrs):   Weight Limit(lb): < or = to 25 pounds Lifting(hrs):   Weight  Limit(lb):     Comments: DX left wrist and shoulder negative for acute fracture or dislocation  Limit use of left upper extremity; no lifting greater than 25 pounds  Flexeril as prescribed; medication should not be used while working or driving  Alternate Tylenol and ibuprofen as needed for pain  Alternate heat and ice application  Wrist brace for comfort  Return in 3 days for reevaluation    Repetitive Actions   Hands: i.e. Fine Manipulations from Grasping:     Feet: i.e. Operating Foot Controls:     Driving / Operate Machinery:     Health Care Provider’s Original or Electronic Signature  PASCUAL Bull Health Care Provider’s Original or Electronic Signature    Dakota Kilgore DO MPH     Clinic Name / Location: [address] [address] Clinic Phone Number: Dept: [phone]   Appointment Time: 11:15 Am Visit Start Time: 12:52 PM   Check-In Time:  11:15 Am Visit Discharge Time: 1:37 PM   Original-Treating Physician or Chiropractor    Page 2-Insurer/TPA    Page 3-Employer    Page 4-Employee

## 2024-01-08 NOTE — LETTER
"    EMPLOYEE’S CLAIM FOR COMPENSATION/ REPORT OF INITIAL TREATMENT  FORM C-4  PLEASE TYPE OR PRINT    EMPLOYEE’S CLAIM - PROVIDE ALL INFORMATION REQUESTED   First Name                    MELINDA Gao Last Name  Scott Birthdate                    1985                Sex  []M  []F Claim Number (Insurer’s Use Only)     Home Address  [address] Age  38 y.o. 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 (if applicable)  duty assignment cars/ welcome    How did this injury or occupational disease occur? (Be specific and answer in detail. 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   If you believe that you have an occupational disease, when did you first have knowledge of the disability and its relationship to your employment?  N/A Witnesses to the Accident (if applicable)  Students/ Camera      Nature of Injury or Occupational Disease  Strain  Part(s) of Body Injured or Affected  Lower Arm (L) Wrist (L) and Hand (L) " Hip (L)    I CERTIFY THAT THE ABOVE IS TRUE AND CORRECT TO T HE BEST OF MY KNOWLEDGE AND THAT I HAVE PROVIDED THIS INFORMATION IN ORDER TO OBTAIN THE BENEFITS OF [place], OR [place]).  I HEREBY AUTHORIZE ANY PHYSICIAN, CHIROPRACTOR, SURGEON, PRACTITIONER OR ANY OTHER PERSON, ANY HOSPITAL, INCLUDING [facility] OR [facility], ANY  MEDICAL SERVICE ORGANIZATION, ANY INSURANCE COMPANY, OR OTHER INSTITUTION OR ORGANIZATION TO RELEASE TO EACH OTHER, ANY MEDICAL OR OTHER INFORMATION, INCLUDING BENEFITS PAID OR PAYABLE, PERTINENT TO THIS INJURY OR DISEASE, EXCEPT INFORMATION RELATIVE TO DIAGNOSIS, TREATMENT AND/OR COUNSELING FOR AIDS, PSYCHOLOGICAL CONDITIONS, ALCOHOL OR CONTROLLED SUBSTANCES, FOR WHICH I MUST GIVE SPECIFIC AUTHORIZATION.  A PHOTOSTAT OF THIS AUTHORIZATION SHALL BE VALID AS THE ORIGINAL.     Date 01/08/2024   [facility] Employee’s Original or  *Electronic Signature   THIS REPORT MUST BE COMPLETED AND MAILED WITHIN 3 WORKING DAYS OF TREATMENT   Place  [facility]    Name of [facility]   Date 1/8/2024 Diagnosis and Description of Injury or Occupational Disease  (W19.XXXA) Fall, initial encounter  (S66.912A) Strain of left wrist, initial encounter  (S46.912A) Strain of left shoulder, initial encounter  Diagnoses of Fall, initial encounter, Strain of left wrist, initial encounter, and Strain of left shoulder, initial encounter were pertinent to this visit. Is there evidence that the injured employee was under the influence of alcohol and/or another controlled substance at the time of accident?  []No  [] Yes (if yes, please explain)   Hour 12:52 PM  No   Treatment: DX left wrist and shoulder negative for acute fracture or dislocation  Work restrictions per D39  Flexeril as prescribed; medication should not be used while working or driving  Alternate Tylenol and  ibuprofen as needed for pain  Alternate heat and ice application  Wrist brace for comfort  Return in 3 days for reevaluation    Have you advised the patient to remain off work five days or more?   [] Yes Indicate dates: From   To    []No If no, is the injured employee capable of: [] full duty [] modified duty                                                             No  Yes  If modified duty, specify any limitations / restrictions:  Limit use of left upper extremity, no lifting greater than 25 pounds                                                                                                                                                                                                                                                                                                                                                                                                               X-Ray Findings: Negative    From information given by the employee, together with medical evidence, can you directly connect this injury or occupational disease as job incurred?  []Yes   [] No Yes    Is additional medical care by a physician indicated? []Yes [] No  Yes    Do you know of any previous injury or disease contributing to this condition or occupational disease? []Yes [] No (Explain if yes)                          No   Date  1/8/2024 Print Health Care Provider’s Name  PASCUAL Bull I certify that the employer’s copy of  this form was delivered to the employer on:   Address  [address] [place] [address] INSURER'S USE ONLY                       [facility]  [address] Provider’s Tax ID Number  [id number]   Telephone  Dept: [phone]    Health Care Provider’s Original or Electronic Signature  e-SignCARTERNOY Degree (MD,DO, DC,PACassyC,APRN)  APRN  Choose (if applicable)      ORIGINAL - TREATING HEALTHCARE PROVIDER PAGE 2 - INSURER/TPA PAGE 3 - EMPLOYER PAGE 4 - EMPLOYEE             Form  "C-4 (rev.08/23)        BRIEF DESCRIPTION OF RIGHTS AND BENEFITS  (Pursuant to [place] 616C.050)    Notice of Injury or Occupational Disease (Incident Report Form C-1): If an injury or occupational disease (OD) arises out of and in the course of employment, you must provide written notice to your employer as soon as practicable, but no later than 7 days after the accident or OD. Your employer shall maintain a sufficient supply of the required forms.    Claim for Compensation (Form C-4): If medical treatment is sought, the form C-4 is available at the place of initial treatment. A completed \"Claim for Compensation\" (Form C-4) must be filed within 90 days after an accident or OD. The treating physician or chiropractor must, within 3 working days after treatment, complete and mail to the employer, the employer's insurer and third-party , the Claim for Compensation.    Medical Treatment: If you require medical treatment for your on-the-job injury or OD, you may be required to select a physician or chiropractor from a list provided by your workers’ compensation insurer, if it has contracted with an Organization for Managed Care (MCO) or Preferred Provider Organization (PPO) or providers of health care. If your employer has not entered into a contract with an MCO or PPO, you may select a physician or chiropractor from the Panel of Physicians and Chiropractors. Any medical costs related to your industrial injury or OD will be paid by your insurer.    Temporary Total Disability (TTD): If your doctor has certified that you are unable to work for a period of at least 5 consecutive days, or 5 cumulative days in a 20-day period, or places restrictions on you that your employer does not accommodate, you may be entitled to TTD compensation.    Temporary Partial Disability (TPD): If the wage you receive upon reemployment is less than the compensation for TTD to which you are entitled, the insurer may be required to pay you " TPD compensation to make up the difference. TPD can only be paid for a maximum of 24 months.    Permanent Partial Disability (PPD): When your medical condition is stable and there is an indication of a PPD as a result of your injury or OD, within 30 days, your insurer must arrange for an evaluation by a rating physician or chiropractor to determine the degree of your PPD. The amount of your PPD award depends on the date of injury, the results of the PPD evaluation, your age and wage.    Permanent Total Disability (PTD): If you are medically certified by a treating physician or chiropractor as permanently and totally disabled and have been granted a PTD status by your insurer, you are entitled to receive monthly benefits not to exceed 66 2/3% of your average monthly wage. The amount of your PTD payments is subject to reduction if you previously received a lump-sum PPD award.    Vocational Rehabilitation Services: You may be eligible for vocational rehabilitation services if you are unable to return to the job due to a permanent physical impairment or permanent restrictions as a result of your injury or occupational disease.    Transportation and Per Myrtle Reimbursement: You may be eligible for travel expenses and per myrtle associated with medical treatment.    Reopening: You may be able to reopen your claim if your condition worsens after claim closure.     Appeal Process: If you disagree with a written determination issued by the insurer or the insurer does not respond to your request, you may appeal to the Department of Administration, , by following the instructions contained in your determination letter. You must appeal the determination within 70 days from the date of the determination letter at [address], [address], [place], [place] [address], or [address], [address], [place] [address]. If you disagree with the  decision, you may appeal to the Department  of Administration, . You must file your appeal within 30 days from the date of the  decision letter at [address], [address], [place], [place] [address], or [address], [place], [place] [address]. If you disagree with a decision of an , you may file a petition for judicial review with the District Court. You must do so within 30 days of the Appeal Officer’s decision. You may be represented by an  at your own expense or you may contact the [facility] for possible representation.    [place]  for Injured Workers (NAIW): If you disagree with a  decision, you may request that NAIW represent you without charge at an  Hearing. For information regarding denial of benefits, you may contact the [facility] at: [address], [address], [place] [address], [phone], or [address], [place] [address], [phone]    To File a Complaint with the Division: If you wish to file a complaint with the  of the Division of Industrial Relations (DIR),  please contact the Workers’ Compensation Section, [address], [address], [place], [place] [address], telephone [phone], or [address], [address], [place], [place] [address], telephone [phone].    For assistance with Workers’ Compensation Issues: You may contact the [facility] Office for Consumer Health Assistance, [address], [address], [place] [address], Toll Free 1-315.618.1852, Web site: http://[facility].[place].gov/Programs/SANDIP E-mail: [email or web address].nv.gov              __________________________________________________________________                                    ___01/08/2024____            Employee Name / Signature                                                                                                                            Date                                                                                                                                                                                                                               D-2 (rev. 10/20)

## 2024-01-11 ENCOUNTER — OCCUPATIONAL MEDICINE (OUTPATIENT)
Dept: URGENT CARE | Facility: PHYSICIAN GROUP | Age: 39
End: 2024-01-11
Payer: COMMERCIAL

## 2024-01-11 VITALS
DIASTOLIC BLOOD PRESSURE: 64 MMHG | TEMPERATURE: 97.1 F | HEART RATE: 63 BPM | RESPIRATION RATE: 16 BRPM | HEIGHT: 62 IN | OXYGEN SATURATION: 96 % | BODY MASS INDEX: 48.58 KG/M2 | WEIGHT: 264 LBS | SYSTOLIC BLOOD PRESSURE: 108 MMHG

## 2024-01-11 DIAGNOSIS — S63.502D SPRAIN OF LEFT WRIST, SUBSEQUENT ENCOUNTER: ICD-10-CM

## 2024-01-11 DIAGNOSIS — S46.912D SHOULDER STRAIN, LEFT, SUBSEQUENT ENCOUNTER: ICD-10-CM

## 2024-01-11 PROCEDURE — 99213 OFFICE O/P EST LOW 20 MIN: CPT | Performed by: PHYSICIAN ASSISTANT

## 2024-01-11 PROCEDURE — 3078F DIAST BP <80 MM HG: CPT | Performed by: PHYSICIAN ASSISTANT

## 2024-01-11 PROCEDURE — 3074F SYST BP LT 130 MM HG: CPT | Performed by: PHYSICIAN ASSISTANT

## 2024-01-11 ASSESSMENT — FIBROSIS 4 INDEX: FIB4 SCORE: 0.55

## 2024-01-11 NOTE — PROGRESS NOTES
"Subjective:     Murray Chavez is a 38 y.o. female who presents for Work-Related Injury ( FV DOI: 01-08-24 (L) SHOULDER, WRIST AND HIP)      Date of injury 1/8/2024: Patient with second urgent care visit following left wrist sprain and left shoulder strain injury.  She reports her shoulder has recovered completely.  She has been wearing the left wrist splint which initially was slightly helpful however she has felt some wrist stiffness.  Still has pain with range of motion of the wrist.  She has been following work restrictions.  No contributory comorbidities    PMH:   No pertinent past medical history to this problem  MEDS:  Medications were reviewed in EMR  ALLERGIES:  Allergies were reviewed in EMR  SOCHX:  Works as a teacher  FH:   No pertinent family history to this problem       Objective:     /64   Pulse 63   Temp 36.2 °C (97.1 °F) (Temporal)   Resp 16   Ht 1.575 m (5' 2\")   Wt 120 kg (264 lb)   LMP 12/09/2016   SpO2 96%   BMI 48.29 kg/m²     Alert nontoxic female no acute distress.  Nontender to palpation over the bony prominence of the shoulder with full range of motion of the left shoulder.  Patient with mild left-sided snuffbox tenderness to palpation and pain with radial deviation of wrist.  Full  strength, neurovascularly intact otherwise.      RADIOLOGY RESULTS   DX-WRIST-COMPLETE 3+ LEFT    Result Date: 1/8/2024 1/8/2024 1:08 PM HISTORY/REASON FOR EXAM:  Pain/Deformity Following Trauma. TECHNIQUE/EXAM DESCRIPTION AND NUMBER OF VIEWS:  4 views of the LEFT wrist. COMPARISON: None FINDINGS: BONE MINERALIZATION: Normal. JOINTS: Preserved. No erosions. FRACTURE: None. DISLOCATION: None. SOFT TISSUES: No mass.     No acute osseous abnormality.    DX-SHOULDER 2+ LEFT    Result Date: 1/8/2024 1/8/2024 1:08 PM HISTORY/REASON FOR EXAM:  Pain/Deformity Following Trauma. TECHNIQUE/EXAM DESCRIPTION AND NUMBER OF VIEWS:  3 views of the LEFT shoulder. COMPARISON: None FINDINGS: BONE " MINERALIZATION: Normal. JOINTS: Preserved. No erosions. FRACTURE: None. DISLOCATION: None. SOFT TISSUES: No mass.     No acute osseous abnormality.           Assessment/Plan:       1. Sprain of left wrist, subsequent encounter    2. Shoulder strain, left, subsequent encounter    Released to Restricted Duty FROM 1/11/2024 TO 1/17/2024  Patient to remain in left-sided wrist splint at work.  May take off frequently for range of motion exercises but should wear the majority of the time during the day and at night.  10 pound weight limit with left arm for lifting.  Avoid repetitive motions.  Follow-up on 1116 for repeat evaluation, x-rays to evaluate for any scaphoid dysfunction  No evidence of fracture on previous x-rays    Differential diagnosis, natural history, supportive care, and indications for immediate follow-up discussed.

## 2024-01-11 NOTE — LETTER
[facility]  [address] [place] [address] [place] [address]  Phone:  [phone] - Fax:  [phone]   [facility] Progress Report and Disability Certification  Date of Service: 1/11/2024   No Show:  No  Date / Time of Next Visit: 1/17/2024   Claim Information   Patient Name: Murray Chavez  Claim Number:     Employer: [employer]  Date of Injury: 1/8/2024     Insurer / TPA: [employer]  ID / SSN:     Occupation: TEACHER  Diagnosis: There were no encounter diagnoses.    Medical Information   Related to Industrial Injury? Yes    Subjective Complaints:  Date of injury 1/8/2024: Patient with second urgent care visit following left wrist sprain and left shoulder strain injury.  She reports her shoulder has recovered completely.  She has been wearing the left wrist splint which initially was slightly helpful however she has felt some wrist stiffness.  Still has pain with range of motion of the wrist.  She has been following work restrictions.  No contributory comorbidities   Objective Findings: Alert nontoxic female no acute distress.  Nontender to palpation over the bony prominence of the shoulder with full range of motion of the left shoulder.  Patient with mild left-sided snuffbox tenderness to palpation and pain with radial deviation of wrist.  Full  strength, neurovascularly intact otherwise.   Pre-Existing Condition(s):     Assessment:   Condition Improved    Status: Additional Care Required  Permanent Disability:No    Plan:      Diagnostics:      Comments:  No evidence of fracture on previous x-rays    Disability Information   Status: Released to Restricted Duty    From:  1/11/2024  Through: 1/17/2024 Restrictions are: Temporary   Physical Restrictions   Sitting:    Standing:    Stooping:    Bending:      Squatting:    Walking:    Climbing:    Pushing:      Pulling:    Other:    Reaching Above Shoulder (L):   Reaching Above Shoulder (R):        Reaching Below Shoulder (L):    Reaching Below Shoulder (R):      Not to exceed Weight Limits   Carrying(hrs):   Weight Limit(lb):   Lifting(hrs):   Weight  Limit(lb):     Comments: Patient to remain in left-sided wrist splint at work.  May take off frequently for range of motion exercises but should wear the majority of the time during the day and at night.  10 pound weight limit with left arm for lifting.  Avoid repetitive motions.  Follow-up on 1116 for repeat evaluation, x-rays to evaluate for any scaphoid dysfunction    Repetitive Actions   Hands: i.e. Fine Manipulations from Grasping:     Feet: i.e. Operating Foot Controls:     Driving / Operate Machinery:     Health Care Provider’s Original or Electronic Signature  Boone Lee P.A.-C. Health Care Provider’s Original or Electronic Signature    Dakota Kilgore DO MPH     Clinic Name / Location: [address] [address] Clinic Phone Number: Dept: [phone]   Appointment Time: 2:30 Pm Visit Start Time: 1:50 PM   Check-In Time:  1:25 Pm Visit Discharge Time: 2:47 PM    Original-Treating Physician or Chiropractor    Page 2-Insurer/TPA    Page 3-Employer    Page 4-Employee

## 2024-01-19 ENCOUNTER — APPOINTMENT (OUTPATIENT)
Dept: RADIOLOGY | Facility: IMAGING CENTER | Age: 39
End: 2024-01-19
Attending: NURSE PRACTITIONER
Payer: COMMERCIAL

## 2024-01-19 ENCOUNTER — OCCUPATIONAL MEDICINE (OUTPATIENT)
Dept: URGENT CARE | Facility: PHYSICIAN GROUP | Age: 39
End: 2024-01-19
Payer: COMMERCIAL

## 2024-01-19 VITALS
TEMPERATURE: 97.4 F | OXYGEN SATURATION: 96 % | WEIGHT: 264 LBS | HEIGHT: 62 IN | RESPIRATION RATE: 16 BRPM | HEART RATE: 89 BPM | BODY MASS INDEX: 48.58 KG/M2 | SYSTOLIC BLOOD PRESSURE: 102 MMHG | DIASTOLIC BLOOD PRESSURE: 64 MMHG
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
## 2024-01-19 DIAGNOSIS — M79.645 PAIN OF LEFT THUMB: ICD-10-CM

## 2024-01-19 DIAGNOSIS — S63.622D SPRAIN OF INTERPHALANGEAL JOINT OF LEFT THUMB, SUBSEQUENT ENCOUNTER: Primary | ICD-10-CM

## 2024-01-19 PROCEDURE — 3078F DIAST BP <80 MM HG: CPT | Performed by: NURSE PRACTITIONER

## 2024-01-19 PROCEDURE — 73140 X-RAY EXAM OF FINGER(S): CPT | Mod: TC,FY,LT | Performed by: NURSE PRACTITIONER

## 2024-01-19 PROCEDURE — 3074F SYST BP LT 130 MM HG: CPT | Performed by: NURSE PRACTITIONER

## 2024-01-19 PROCEDURE — 99213 OFFICE O/P EST LOW 20 MIN: CPT | Performed by: NURSE PRACTITIONER

## 2024-01-19 ASSESSMENT — ENCOUNTER SYMPTOMS: FALLS: 1

## 2024-01-19 ASSESSMENT — FIBROSIS 4 INDEX: FIB4 SCORE: 0.55

## 2024-01-19 NOTE — LETTER
[facility] [place] [address]  Phone:  [phone] - Fax:  [phone]   [facility] Progress Report and Disability Certification  Date of Service: 1/19/2024   No Show:  No  Date / Time of Next Visit: 2/2/2024   Claim Information   Patient Name: Murray Chavez  Claim Number:     Employer: [employer]  Date of Injury: 1/8/2024     Insurer / TPA: [facility]  ID / SSN:     Occupation: TEACHER  Diagnosis: The primary encounter diagnosis was Sprain of interphalangeal joint of left thumb, subsequent encounter. Diagnoses of Pain of left thumb, Fall due to slipping on ice or snow, initial encounter, and Work related injury were also pertinent to this visit.    Medical Information   Related to Industrial Injury? 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  She reports falling on her left wrist and shoulder.  She reports pain is 6-7\10.  She reports tingling in her fingertips.  She denies hitting her head or any loss of consciousness.  No pre-existing injuries to her left wrist or shoulder.    Follow-up visit #1.  1/11/2024.  Date of injury 1/8/2024: Patient with second urgent care visit following left wrist sprain and left shoulder strain injury. She reports her shoulder has recovered completely. She has been wearing the left wrist splint which initially was slightly helpful however she has felt some wrist stiffness. Still has pain with range of motion of the wrist. She has been following work restrictions. No contributory comorbidity.    Follow-up visit number 2. 1/19/2024.  Murray has continuously been wearing her wrist brace as needed.  She continues to endorse pain of her left first digit.  She states that she is able to move this however, this does cause significant discomfort.  Over-the-counter supportive treatment has been  used.  She has been following her work restrictions.   Objective Findings:  Right hand: Tenderness and bony tenderness present. No swelling or deformity. Decreased strength of finger abduction and thumb/finger opposition. Normal sensation. There is no disruption of two-point discrimination.      Cervical back: Normal range of motion and neck supple.      Comments: Pain present with palpation at left first digit.  Range of motion is intact however, this does elicit pain.      Pre-Existing Condition(s): DX-FINGER(S) 2+ LEFT    Result Date: 1/19/2024 1/19/2024 5:02 PM HISTORY/REASON FOR EXAM:  Pain/Deformity Following Trauma; left first digit Pain to base of thumb area after a slip and fall on ice at work 11 days ago TECHNIQUE/EXAM DESCRIPTION AND NUMBER OF VIEWS:  3 views of the LEFT fingers. COMPARISON: None FINDINGS: No acute fracture or dislocation. No joint osteoarthritis.     No acute osseous abnormality.   Assessment:   Condition Same    Status: Additional Care Required  Permanent Disability:No    Plan: Diagnostics    Diagnostics: X-ray    Comments:       Disability Information   Status: Released to Restricted Duty    From:  1/19/2024  Through: 2/2/2024 Restrictions are:     Physical Restrictions   Sitting:    Standing:    Stooping:    Bending:      Squatting:    Walking:    Climbing:    Pushing:      Pulling:    Other:    Reaching Above Shoulder (L):   Reaching Above Shoulder (R):       Reaching Below Shoulder (L):    Reaching Below Shoulder (R):      Not to exceed Weight Limits   Carrying(hrs):   Weight Limit(lb): < or = to 10 pounds Lifting(hrs):   Weight  Limit(lb): < or = to 10 pounds   Comments:      Repetitive Actions   Hands: i.e. Fine Manipulations from Grasping:     Feet: i.e. Operating Foot Controls:     Driving / Operate Machinery:     Health Care Provider’s Original or Electronic Signature  TACHO Bill. Health Care Provider’s Original or Electronic Signature    Dakota Kilgore DO MPH      Clinic Name / Location: [facility]  [address] [place] [address]  [place] [address] Clinic Phone Number: Dept: [phone]   Appointment Time: 4:00 Pm Visit Start Time: 4:13 PM   Check-In Time:  3:55 Pm Visit Discharge Time: 5:21 Pm    Original-Treating Physician or Chiropractor    Page 2-Insurer/TPA    Page 3-Employer    Page 4-Employee

## 2024-01-20 NOTE — PROGRESS NOTES
Subjective:     Murray Chavez is a 38 y.o. 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  She reports falling on her left wrist and shoulder.  She reports pain is 6-7\10.  She reports tingling in her fingertips.  She denies hitting her head or any loss of consciousness.  No pre-existing injuries to her left wrist or shoulder.    Follow-up visit #1.  1/11/2024.  Date of injury 1/8/2024: Patient with second urgent care visit following left wrist sprain and left shoulder strain injury. She reports her shoulder has recovered completely. She has been wearing the left wrist splint which initially was slightly helpful however she has felt some wrist stiffness. Still has pain with range of motion of the wrist. She has been following work restrictions. No contributory comorbidity.    Follow-up visit number 2. 1/19/2024.  Murray has continuously been wearing her wrist brace as needed.  She continues to endorse pain of her left first digit.  She states that she is able to move this however, this does cause significant discomfort.  Over-the-counter supportive treatment has been used.  She has been following her work restrictions.  Review of Systems   Musculoskeletal:  Positive for falls and joint pain.       PMH:   Past Medical History:   Diagnosis Date    HPV in female     DX 2008    Migraine     Urinary tract infection, site not specified     bladder infections     ALLERGIES: No Known Allergies  SURGHX:   Past Surgical History:   Procedure Laterality Date    ABDOMINAL HYSTERECTOMY TOTAL  10/11/2017    Procedure: ABDOMINAL HYSTERECTOMY TOTAL;  Surgeon: Barbra Ulloa M.D.;  Location: SURGERY [facility];  Service: Obstetrics    OOPHORECTOMY Left 10/11/2017    Procedure: OOPHORECTOMY;  Surgeon: Barbra Ulloa M.D.;  Location: SURGERY  "[facility];  Service: Obstetrics    SALPINGECTOMY Bilateral 10/11/2017    Procedure: SALPINGECTOMY;  Surgeon: Barbra Ulloa M.D.;  Location: SURGERY [facility];  Service: Obstetrics    LYSIS ADHESIONS GYN  10/11/2017    Procedure: LYSIS ADHESIONS GYN;  Surgeon: Barbra Ulloa M.D.;  Location: SURGERY [facility];  Service: Obstetrics    PRIMARY C SECTION  2017    Procedure: PRIMARY C SECTION;  Surgeon: Barbra Ulloa M.D.;  Location: LABOR AND DELIVERY;  Service: Obstetrics     SOCHX:   Social History     Socioeconomic History    Marital status:    Tobacco Use    Smoking status: Former     Current packs/day: 0.00     Types: Cigarettes     Quit date: 2009     Years since quittin.3    Smokeless tobacco: Never   Substance and Sexual Activity    Alcohol use: No    Drug use: No    Sexual activity: Yes     Partners: Male     FH: No family history on file.      Objective:   /64   Pulse 89   Temp 36.3 °C (97.4 °F) (Temporal)   Resp 16   Ht 1.575 m (5' 2\")   Wt 120 kg (264 lb)   LMP 2016   SpO2 96%   BMI 48.29 kg/m²     Physical Exam  Vitals and nursing note reviewed.   Constitutional:       General: She is not in acute distress.     Appearance: Normal appearance. She is normal weight. She is not ill-appearing or toxic-appearing.   HENT:      Head: Normocephalic.      Right Ear: External ear normal.      Left Ear: External ear normal.      Nose: No congestion or rhinorrhea.      Mouth/Throat:      Pharynx: No oropharyngeal exudate or posterior oropharyngeal erythema.   Eyes:      General:         Right eye: No discharge.         Left eye: No discharge.      Pupils: Pupils are equal, round, and reactive to light.   Pulmonary:      Effort: Pulmonary effort is normal.   Abdominal:      General: Abdomen is flat.   Musculoskeletal:         General: Normal range of motion.      Right hand: Tenderness and bony tenderness present. No swelling or deformity. Decreased strength of " finger abduction and thumb/finger opposition. Normal sensation. There is no disruption of two-point discrimination.      Cervical back: Normal range of motion and neck supple.      Comments: Pain present with palpation at left first digit.  Range of motion is intact however, this does elicit pain.   Skin:     General: Skin is dry.   Neurological:      General: No focal deficit present.      Mental Status: She is alert and oriented to person, place, and time. Mental status is at baseline.   Psychiatric:         Mood and Affect: Mood normal.         Behavior: Behavior normal.         Thought Content: Thought content normal.         Judgment: Judgment normal.     DX-FINGER(S) 2+ LEFT    Result Date: 1/19/2024 1/19/2024 5:02 PM HISTORY/REASON FOR EXAM:  Pain/Deformity Following Trauma; left first digit Pain to base of thumb area after a slip and fall on ice at work 11 days ago TECHNIQUE/EXAM DESCRIPTION AND NUMBER OF VIEWS:  3 views of the LEFT fingers. COMPARISON: None FINDINGS: No acute fracture or dislocation. No joint osteoarthritis.     No acute osseous abnormality.    DX-WRIST-COMPLETE 3+ LEFT    Result Date: 1/8/2024 1/8/2024 1:08 PM HISTORY/REASON FOR EXAM:  Pain/Deformity Following Trauma. TECHNIQUE/EXAM DESCRIPTION AND NUMBER OF VIEWS:  4 views of the LEFT wrist. COMPARISON: None FINDINGS: BONE MINERALIZATION: Normal. JOINTS: Preserved. No erosions. FRACTURE: None. DISLOCATION: None. SOFT TISSUES: No mass.     No acute osseous abnormality.    DX-SHOULDER 2+ LEFT    Result Date: 1/8/2024 1/8/2024 1:08 PM HISTORY/REASON FOR EXAM:  Pain/Deformity Following Trauma. TECHNIQUE/EXAM DESCRIPTION AND NUMBER OF VIEWS:  3 views of the LEFT shoulder. COMPARISON: None FINDINGS: BONE MINERALIZATION: Normal. JOINTS: Preserved. No erosions. FRACTURE: None. DISLOCATION: None. SOFT TISSUES: No mass.     No acute osseous abnormality.     Assessment/Plan:   Assessment    1. Sprain of interphalangeal joint of left thumb,  subsequent encounter        2. Pain of left thumb  DX-FINGER(S) 2+ LEFT      3. Fall due to slipping on ice or snow, initial encounter  DX-FINGER(S) 2+ LEFT      4. Work related injury  DX-FINGER(S) 2+ LEFT        X-ray repeated today due to ongoing pain and no acute fractures present.  Patient to continue with gentle range of motion exercises and splinting for optimal healing.  Continue with ibuprofen and Tylenol as needed.  Work restrictions were renewed.  Follow-up in 2 weeks or sooner for worsening symptoms.  AVS handout given and reviewed with patient. Pt educated on red flags and when to seek treatment back in ER or UC.

## 2024-01-31 ENCOUNTER — OCCUPATIONAL MEDICINE (OUTPATIENT)
Dept: URGENT CARE | Facility: PHYSICIAN GROUP | Age: 39
End: 2024-01-31
Payer: COMMERCIAL

## 2024-01-31 VITALS
TEMPERATURE: 97.8 F | DIASTOLIC BLOOD PRESSURE: 68 MMHG | WEIGHT: 244 LBS | BODY MASS INDEX: 44.9 KG/M2 | OXYGEN SATURATION: 96 % | SYSTOLIC BLOOD PRESSURE: 106 MMHG | RESPIRATION RATE: 16 BRPM | HEART RATE: 86 BPM | HEIGHT: 62 IN

## 2024-01-31 DIAGNOSIS — S63.622D SPRAIN OF INTERPHALANGEAL JOINT OF LEFT THUMB, SUBSEQUENT ENCOUNTER: ICD-10-CM

## 2024-01-31 PROCEDURE — 99213 OFFICE O/P EST LOW 20 MIN: CPT | Performed by: NURSE PRACTITIONER

## 2024-01-31 PROCEDURE — 3074F SYST BP LT 130 MM HG: CPT | Performed by: NURSE PRACTITIONER

## 2024-01-31 PROCEDURE — 3078F DIAST BP <80 MM HG: CPT | Performed by: NURSE PRACTITIONER

## 2024-01-31 ASSESSMENT — FIBROSIS 4 INDEX: FIB4 SCORE: 0.55

## 2024-01-31 NOTE — PROGRESS NOTES
Subjective:     Murray Chavez is a 38 y.o. female who presents for Follow-Up (W/c fv wrist inj /Some cramping. 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  She reports falling on her left wrist and shoulder.  She reports pain is 6-7\10.  She reports tingling in her fingertips.  She denies hitting her head or any loss of consciousness.  No pre-existing injuries to her left wrist or shoulder.    Follow-up visit #1.  1/11/2024.  Date of injury 1/8/2024: Patient with second urgent care visit following left wrist sprain and left shoulder strain injury. She reports her shoulder has recovered completely. She has been wearing the left wrist splint which initially was slightly helpful however she has felt some wrist stiffness. Still has pain with range of motion of the wrist. She has been following work restrictions. No contributory comorbidity.     FV #1. Date of injury 1/5/2024: Patient with second urgent care visit following left wrist sprain and left shoulder strain injury. She reports her shoulder has recovered completely. She has been wearing the left wrist splint which initially was slightly helpful however she has felt some wrist stiffness. Still has pain with range of motion of the wrist. She has been following work restrictions. No contributory comorbidities     Follow-up visit number 2. 1/19/2024.  Murray has continuously been wearing her wrist brace as needed.  She continues to endorse pain of her left first digit.  She states that she is able to move this however, this does cause significant discomfort.  Over-the-counter supportive treatment has been used.  She has been following her work restrictions.    Follow up #3: Patient notes that symptoms are significantly improving.  She does have intermittent cramping of her left thumb however this is improved with massage and rest.  Occasional use of ibuprofen as  "needed.  She is able to follow her work restrictions without issue.    ROS    PMH:   Past Medical History:   Diagnosis Date    HPV in female     DX     Migraine     Urinary tract infection, site not specified     bladder infections     ALLERGIES: No Known Allergies  SURGHX:   Past Surgical History:   Procedure Laterality Date    ABDOMINAL HYSTERECTOMY TOTAL  10/11/2017    Procedure: ABDOMINAL HYSTERECTOMY TOTAL;  Surgeon: Barbra Ulloa M.D.;  Location: SURGERY [facility];  Service: Obstetrics    OOPHORECTOMY Left 10/11/2017    Procedure: OOPHORECTOMY;  Surgeon: Barbra Ulloa M.D.;  Location: SURGERY [facility];  Service: Obstetrics    SALPINGECTOMY Bilateral 10/11/2017    Procedure: SALPINGECTOMY;  Surgeon: Barbra Ulloa M.D.;  Location: SURGERY [facility];  Service: Obstetrics    LYSIS ADHESIONS GYN  10/11/2017    Procedure: LYSIS ADHESIONS GYN;  Surgeon: Barbra Ulloa M.D.;  Location: SURGERY [facility];  Service: Obstetrics    PRIMARY C SECTION  2017    Procedure: PRIMARY C SECTION;  Surgeon: Barbra Ulloa M.D.;  Location: LABOR AND DELIVERY;  Service: Obstetrics     SOCHX:   Social History     Socioeconomic History    Marital status:    Tobacco Use    Smoking status: Former     Current packs/day: 0.00     Types: Cigarettes     Quit date: 2009     Years since quittin.4    Smokeless tobacco: Never   Substance and Sexual Activity    Alcohol use: No    Drug use: No    Sexual activity: Yes     Partners: Male     FH: No family history on file.      Objective:   /68   Pulse 86   Temp 36.6 °C (97.8 °F) (Temporal)   Resp 16   Ht 1.575 m (5' 2\")   Wt 111 kg (244 lb)   LMP 2016   SpO2 96%   BMI 44.63 kg/m²     Physical Exam  Vitals and nursing note reviewed.   Constitutional:       General: She is not in acute distress.     Appearance: Normal appearance. She is normal weight. She is not ill-appearing or toxic-appearing.   HENT:      Head: Normocephalic. "      Right Ear: External ear normal.      Left Ear: External ear normal.      Nose: No congestion or rhinorrhea.      Mouth/Throat:      Pharynx: No oropharyngeal exudate or posterior oropharyngeal erythema.   Eyes:      General:         Right eye: No discharge.         Left eye: No discharge.      Pupils: Pupils are equal, round, and reactive to light.   Cardiovascular:      Rate and Rhythm: Normal rate and regular rhythm.   Pulmonary:      Effort: Pulmonary effort is normal.   Abdominal:      General: Abdomen is flat.   Musculoskeletal:         General: Normal range of motion.      Cervical back: Normal range of motion and neck supple.      Comments: Mild pain with palpation to first interphalangeal joint of left hand.  Range of motion intact.  Negative for swelling or erythema.   Skin:     General: Skin is dry.   Neurological:      General: No focal deficit present.      Mental Status: She is alert and oriented to person, place, and time. Mental status is at baseline.   Psychiatric:         Mood and Affect: Mood normal.         Behavior: Behavior normal.         Thought Content: Thought content normal.         Judgment: Judgment normal.         Assessment/Plan:   Assessment      1. Sprain of interphalangeal joint of left thumb, subsequent encounter          Patient to continue with work restrictions as needed and follow-up in 2 weeks or sooner for worsening symptoms.  Expect to discharge at next visit.

## 2024-01-31 NOTE — LETTER
[facility] [place] [place] [address]  Phone:  [phone] - Fax:  [phone]   [facility] Progress Report and Disability Certification  Date of Service: 1/31/2024   No Show:  No  Date / Time of Next Visit: 2/14/2024 10:00 AM    Claim Information   Patient Name: Murray Chavez  Claim Number:     Employer: [employer]  Date of Injury: 1/8/2024     Insurer / TPA: [employer]  ID / SSN:     Occupation: TEACHER  Diagnosis: The encounter diagnosis was Sprain of interphalangeal joint of left thumb, subsequent encounter.    Medical Information   Related to Industrial Injury? 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  She reports falling on her left wrist and shoulder.  She reports pain is 6-7\10.  She reports tingling in her fingertips.  She denies hitting her head or any loss of consciousness.  No pre-existing injuries to her left wrist or shoulder.    Follow-up visit #1.  1/11/2024.  Date of injury 1/8/2024: Patient with second urgent care visit following left wrist sprain and left shoulder strain injury. She reports her shoulder has recovered completely. She has been wearing the left wrist splint which initially was slightly helpful however she has felt some wrist stiffness. Still has pain with range of motion of the wrist. She has been following work restrictions. No contributory comorbidity.     FV #1. Date of injury 1/5/2024: Patient with second urgent care visit following left wrist sprain and left shoulder strain injury. She reports her shoulder has recovered completely. She has been wearing the left wrist splint which initially was slightly helpful however she has felt some wrist stiffness. Still has pain with range of motion of the wrist. She has been following work restrictions. No contributory comorbidities     Follow-up  visit number 2. 1/19/2024.  Murray has continuously been wearing her wrist brace as needed.  She continues to endorse pain of her left first digit.  She states that she is able to move this however, this does cause significant discomfort.  Over-the-counter supportive treatment has been used.  She has been following her work restrictions.    Follow up #3: Patient notes that symptoms are significantly improving.  She does have intermittent cramping of her left thumb however this is improved with massage and rest.  Occasional use of ibuprofen as needed.  She is able to follow her work restrictions without issue.   Objective Findings:  Comments: Mild pain with palpation to first interphalangeal joint of left hand.  Range of motion intact.  Negative for swelling or erythema.      Pre-Existing Condition(s):     Assessment:   Condition Improved    Status: Additional Care Required  Permanent Disability:No    Plan:      Diagnostics:      Comments:       Disability Information   Status: Released to Restricted Duty    From:  1/31/2024  Through: 2/14/2024 Restrictions are:     Physical Restrictions   Sitting:    Standing:    Stooping:    Bending:      Squatting:    Walking:    Climbing:    Pushing:      Pulling:    Other:    Reaching Above Shoulder (L):   Reaching Above Shoulder (R):       Reaching Below Shoulder (L):    Reaching Below Shoulder (R):      Not to exceed Weight Limits   Carrying(hrs):   Weight Limit(lb):   Lifting(hrs):   Weight  Limit(lb):     Comments: No lifting greater than 10 pounds left hand.    Repetitive Actions   Hands: i.e. Fine Manipulations from Grasping:     Feet: i.e. Operating Foot Controls:     Driving / Operate Machinery:     Health Care Provider’s Original or Electronic Signature  PASCUAL Bill Health Care Provider’s Original or Electronic Signature    Dakota Kilgore DO MPH     Clinic Name / Location: [address] [address] Clinic Phone  Number: Dept: [phone]   Appointment Time: 10:00 Am Visit Start Time: 10:21 AM   Check-In Time:  9:51 Am Visit Discharge Time:  10:45 AM    Original-Treating Physician or Chiropractor    Page 2-Insurer/TPA    Page 3-Employer    Page 4-Employee

## 2024-02-14 ENCOUNTER — OCCUPATIONAL MEDICINE (OUTPATIENT)
Dept: URGENT CARE | Facility: PHYSICIAN GROUP | Age: 39
End: 2024-02-14
Payer: COMMERCIAL

## 2024-02-14 VITALS
RESPIRATION RATE: 14 BRPM | SYSTOLIC BLOOD PRESSURE: 128 MMHG | WEIGHT: 244 LBS | BODY MASS INDEX: 44.9 KG/M2 | DIASTOLIC BLOOD PRESSURE: 82 MMHG | OXYGEN SATURATION: 98 % | TEMPERATURE: 97.9 F | HEIGHT: 62 IN | HEART RATE: 74 BPM

## 2024-02-14 DIAGNOSIS — S63.622D SPRAIN OF INTERPHALANGEAL JOINT OF LEFT THUMB, SUBSEQUENT ENCOUNTER: ICD-10-CM

## 2024-02-14 DIAGNOSIS — S63.502D SPRAIN OF LEFT WRIST, SUBSEQUENT ENCOUNTER: ICD-10-CM

## 2024-02-14 PROCEDURE — 3074F SYST BP LT 130 MM HG: CPT | Performed by: PHYSICIAN ASSISTANT

## 2024-02-14 PROCEDURE — 99213 OFFICE O/P EST LOW 20 MIN: CPT | Performed by: PHYSICIAN ASSISTANT

## 2024-02-14 PROCEDURE — 3079F DIAST BP 80-89 MM HG: CPT | Performed by: PHYSICIAN ASSISTANT

## 2024-02-14 ASSESSMENT — FIBROSIS 4 INDEX: FIB4 SCORE: 0.55

## 2024-02-14 NOTE — PROGRESS NOTES
"Subjective:   Murray Chavez is a 38 y.o. female who presents for Follow-Up (W/C, wrist. pt states she's feeling much better)       FU Visit #4. Patient's pain has virtually resolved.  Minimal pain on the volar radial aspect of the wrist.  She has not had any cramping.  No numbness or tingling.  She is able to use and move the wrist normally.  She has not required medications or use of the wrist brace.  Patient feels she is ready to be cleared today.      ROS    PMH: Past medical history reviewed in Epic  MEDS: Medications were reviewed in Epic  ALLERGIES: Allergies were reviewed in Epic     Objective:   /82   Pulse 74   Temp 36.6 °C (97.9 °F) (Temporal)   Resp 14   Ht 1.575 m (5' 2\")   Wt 111 kg (244 lb)   LMP 12/09/2016   SpO2 98%   BMI 44.63 kg/m²   Physical Exam  Vitals reviewed.   Constitutional:       General: She is not in acute distress.     Appearance: Normal appearance. She is well-developed. She is not toxic-appearing.   HENT:      Head: Normocephalic and atraumatic.      Right Ear: External ear normal.      Left Ear: External ear normal.      Nose: Nose normal.   Cardiovascular:      Rate and Rhythm: Normal rate and regular rhythm.   Pulmonary:      Effort: Pulmonary effort is normal. No respiratory distress.      Breath sounds: No stridor.   Musculoskeletal:      Comments: Left wrist/hand  General: no gross deformity, ecchymosis, or erythema  Palpation:  NTTP  ROM: wrist extension 60 degrees, flexion 60 degrees, radial deviation 50 degree, ulnar deviation 50 degrees  Strength: 5/5 flexion, 5/5 extension, 5/5   Vascular: radial pulses 2+ and symmetric, cap refill <2 sec     Skin:     General: Skin is dry.   Neurological:      Comments: Alert and oriented.    Psychiatric:         Speech: Speech normal.         Behavior: Behavior normal.          Assessment/Plan:   1. Sprain of interphalangeal joint of left thumb, subsequent encounter    2. Sprain of left wrist, subsequent " encounter    Discharged/MMI. Return precautions reviewed.

## 2024-02-14 NOTE — LETTER
[facility] [place] [place] [address]  Phone:  [phone] - Fax:  [phone]   [facility] Progress Report and Disability Certification  Date of Service: 2/14/2024   No Show:  No  Date / Time of Next Visit:     Claim Information   Patient Name: Murray Chavez  Claim Number:     Employer: [employer]  Date of Injury: 1/8/2024     Insurer / TPA: [employer]  ID / SSN:     Occupation: TEACHER  Diagnosis: Diagnoses of Sprain of interphalangeal joint of left thumb, subsequent encounter and Sprain of left wrist, subsequent encounter were pertinent to this visit.    Medical Information   Related to Industrial Injury? Yes    Subjective Complaints:  FU Visit #4. Patient's pain has virtually resolved.  Minimal pain on the volar radial aspect of the wrist.  She has not had any cramping.  No numbness or tingling.  She is able to use and move the wrist normally.  She has not required medications or use of the wrist brace.  Patient feels she is ready to be cleared today.     Objective Findings: Musculoskeletal:      Comments: Left wrist/hand  General: no gross deformity, ecchymosis, or erythema  Palpation:  NTTP  ROM: wrist extension 60 degrees, flexion 60 degrees, radial deviation 50 degree, ulnar deviation 50 degrees  Strength: 5/5 flexion, 5/5 extension, 5/5   Vascular: radial pulses 2+ and symmetric, cap refill <2 sec     Pre-Existing Condition(s):     Assessment:   Condition Improved    Status: Discharged /  MMI  Permanent Disability:No    Plan:      Diagnostics:      Comments:       Disability Information   Status: Released to Full Duty    From:     Through:   Restrictions are:     Physical Restrictions   Sitting:    Standing:    Stooping:    Bending:      Squatting:    Walking:    Climbing:    Pushing:      Pulling:    Other:    Reaching Above Shoulder (L):   Reaching Above Shoulder (R):       Reaching Below Shoulder (L):     Reaching Below Shoulder (R):      Not to exceed Weight Limits   Carrying(hrs):   Weight Limit(lb):   Lifting(hrs):   Weight  Limit(lb):     Comments:      Repetitive Actions   Hands: i.e. Fine Manipulations from Grasping:     Feet: i.e. Operating Foot Controls:     Driving / Operate Machinery:     Health Care Provider’s Original or Electronic Signature  Jose Alejandro Urbina P.A.-C. Health Care Provider’s Original or Electronic Signature    Dakota Kilgore DO MPH     Clinic Name / Location: [address] [address] Clinic Phone Number: Dept: [phone]   Appointment Time: 10:00 Am Visit Start Time: 10:19 AM   Check-In Time:  9:44 Am Visit Discharge Time: 10:31 Am   Original-Treating Physician or Chiropractor    Page 2-Insurer/TPA    Page 3-Employer    Page 4-Employee

## 2024-05-13 ENCOUNTER — APPOINTMENT (OUTPATIENT)
Dept: RADIOLOGY | Facility: IMAGING CENTER | Age: 39
End: 2024-05-13
Attending: NURSE PRACTITIONER
Payer: MEDICAID

## 2024-05-13 ENCOUNTER — OFFICE VISIT (OUTPATIENT)
Dept: URGENT CARE | Facility: PHYSICIAN GROUP | Age: 39
End: 2024-05-13
Payer: MEDICAID

## 2024-05-13 VITALS
HEART RATE: 93 BPM | WEIGHT: 222 LBS | RESPIRATION RATE: 16 BRPM | HEIGHT: 62 IN | BODY MASS INDEX: 40.85 KG/M2 | DIASTOLIC BLOOD PRESSURE: 74 MMHG | SYSTOLIC BLOOD PRESSURE: 102 MMHG | OXYGEN SATURATION: 97 % | TEMPERATURE: 97.8 F

## 2024-05-13 DIAGNOSIS — J20.9 ACUTE BRONCHITIS, UNSPECIFIED ORGANISM: ICD-10-CM

## 2024-05-13 DIAGNOSIS — R05.3 PERSISTENT COUGH: ICD-10-CM

## 2024-05-13 PROCEDURE — 3074F SYST BP LT 130 MM HG: CPT | Performed by: NURSE PRACTITIONER

## 2024-05-13 PROCEDURE — 3078F DIAST BP <80 MM HG: CPT | Performed by: NURSE PRACTITIONER

## 2024-05-13 PROCEDURE — 99213 OFFICE O/P EST LOW 20 MIN: CPT | Performed by: NURSE PRACTITIONER

## 2024-05-13 PROCEDURE — 71046 X-RAY EXAM CHEST 2 VIEWS: CPT | Mod: TC,FY | Performed by: RADIOLOGY

## 2024-05-13 RX ORDER — ALBUTEROL SULFATE 90 UG/1
2 AEROSOL, METERED RESPIRATORY (INHALATION) EVERY 6 HOURS PRN
Qty: 8.5 G | Refills: 0 | Status: SHIPPED | OUTPATIENT
Start: 2024-05-13

## 2024-05-13 RX ORDER — DEXTROMETHORPHAN HYDROBROMIDE AND PROMETHAZINE HYDROCHLORIDE 15; 6.25 MG/5ML; MG/5ML
5 SYRUP ORAL EVERY 4 HOURS PRN
Qty: 120 ML | Refills: 0 | Status: SHIPPED | OUTPATIENT
Start: 2024-05-13 | End: 2024-05-20

## 2024-05-13 RX ORDER — PREDNISONE 20 MG/1
40 TABLET ORAL DAILY
Qty: 10 TABLET | Refills: 0 | Status: SHIPPED | OUTPATIENT
Start: 2024-05-13 | End: 2024-05-18

## 2024-05-13 ASSESSMENT — ENCOUNTER SYMPTOMS
COUGH: 1
FEVER: 1
SORE THROAT: 0
RHINORRHEA: 0
CHILLS: 1
WHEEZING: 1
SHORTNESS OF BREATH: 1

## 2024-05-13 ASSESSMENT — FIBROSIS 4 INDEX: FIB4 SCORE: 0.55

## 2024-05-13 NOTE — LETTER
May 13, 2024    To Whom It May Concern:         This is confirmation that Murray Chavez attended her scheduled appointment with PASCUAL Bill on 5/13/24. Please excuse her absence due to an acute illness. She may return to work on 5/17/2024 or sooner if better.          If you have any questions please do not hesitate to call me at the phone number listed below.    Sincerely,          TACHO Bill.  [phone]

## 2024-05-13 NOTE — PROGRESS NOTES
Subjective:     Murray Chavez is a 38 y.o. female who presents for Cough (X 1.5  weeks with wheezing, chest tightness, chills, fatigue.  )      Cough  This is a new problem. The current episode started 1 to 4 weeks ago (Murray is a pleasant 38 year old female who presents to  today with complaints of a severe cough X 2 weeks.). The cough is Productive of sputum. Associated symptoms include chills, a fever (tactile), shortness of breath and wheezing. Pertinent negatives include no nasal congestion, rhinorrhea or sore throat. Associated symptoms comments: fatigue. There is no history of asthma.         Review of Systems   Constitutional:  Positive for chills, fever (tactile) and malaise/fatigue.   HENT:  Negative for rhinorrhea and sore throat.    Respiratory:  Positive for cough, shortness of breath and wheezing.        PMH:   Past Medical History:   Diagnosis Date    HPV in female     DX 2008    Migraine     Urinary tract infection, site not specified     bladder infections     ALLERGIES: No Known Allergies  SURGHX:   Past Surgical History:   Procedure Laterality Date    ABDOMINAL HYSTERECTOMY TOTAL  10/11/2017    Procedure: ABDOMINAL HYSTERECTOMY TOTAL;  Surgeon: Barbra Ulloa M.D.;  Location: [facility];  Service: Obstetrics    OOPHORECTOMY Left 10/11/2017    Procedure: OOPHORECTOMY;  Surgeon: Barbra Ulloa M.D.;  Location: [facility];  Service: Obstetrics    SALPINGECTOMY Bilateral 10/11/2017    Procedure: SALPINGECTOMY;  Surgeon: Barbra Ulloa M.D.;  Location: [facility];  Service: Obstetrics    LYSIS ADHESIONS GYN  10/11/2017    Procedure: LYSIS ADHESIONS GYN;  Surgeon: Barbra Ulloa M.D.;  Location: [facility];  Service: Obstetrics    PRIMARY C SECTION  9/1/2017    Procedure: PRIMARY C SECTION;  Surgeon: Barbra Ulloa M.D.;  Location: [facility];  Service: Obstetrics     SOCHX:   Social History     Socioeconomic History    Marital status:  "   Tobacco Use    Smoking status: Former     Current packs/day: 0.00     Types: Cigarettes     Quit date: 2009     Years since quittin.7    Smokeless tobacco: Never   Substance and Sexual Activity    Alcohol use: No    Drug use: No    Sexual activity: Yes     Partners: Male     FH: No family history on file.      Objective:   /74   Pulse 93   Temp 36.6 °C (97.8 °F) (Temporal)   Resp 16   Ht 1.575 m (5' 2\")   Wt 101 kg (222 lb)   LMP 2016   SpO2 97%   BMI 40.60 kg/m²     Physical Exam  Vitals and nursing note reviewed.   Constitutional:       General: She is not in acute distress.     Appearance: Normal appearance. She is normal weight. She is ill-appearing. She is not toxic-appearing.   HENT:      Head: Normocephalic.      Right Ear: Tympanic membrane, ear canal and external ear normal.      Left Ear: Tympanic membrane, ear canal and external ear normal.      Nose: Congestion and rhinorrhea present.      Mouth/Throat:      Mouth: Mucous membranes are moist.      Pharynx: No oropharyngeal exudate or posterior oropharyngeal erythema.   Eyes:      General:         Right eye: No discharge.         Left eye: No discharge.      Pupils: Pupils are equal, round, and reactive to light.   Cardiovascular:      Rate and Rhythm: Normal rate and regular rhythm.      Pulses: Normal pulses.      Heart sounds: Normal heart sounds.   Pulmonary:      Effort: Pulmonary effort is normal.      Breath sounds: Wheezing present.   Abdominal:      General: Abdomen is flat.   Musculoskeletal:         General: Normal range of motion.      Cervical back: Normal range of motion and neck supple. Tenderness present.   Lymphadenopathy:      Cervical: No cervical adenopathy.   Skin:     General: Skin is dry.   Neurological:      General: No focal deficit present.      Mental Status: She is alert and oriented to person, place, and time. Mental status is at baseline.   Psychiatric:         Mood and Affect: Mood " normal.         Behavior: Behavior normal.         Thought Content: Thought content normal.         Judgment: Judgment normal.       DX-CHEST-2 VIEWS    Result Date: 5/13/2024 5/13/2024 11:03 AM HISTORY/REASON FOR EXAM:  Cough. TECHNIQUE/EXAM DESCRIPTION AND NUMBER OF VIEWS: Two views of the chest. COMPARISON:  10/08/2017 FINDINGS: The heart is normal in size. No pulmonary infiltrates or consolidations are noted. No pleural effusions are appreciated.     No active disease.     Assessment/Plan:   Assessment      1. Persistent cough  DX-CHEST-2 VIEWS      2. Acute bronchitis, unspecified organism  promethazine-dextromethorphan (PROMETHAZINE-DM) 6.25-15 MG/5ML syrup    predniSONE (DELTASONE) 20 MG Tab    albuterol 108 (90 Base) MCG/ACT Aero Soln inhalation aerosol        Patient treated for acute bronchitis.  Promethazine/dextromethorphan, albuterol inhaler and prednisone sent to pharmacy.  Side effects of medication discussed with patient. We discussed supportive measures including humidifier, warm salt water gargles, over-the-counter Cepacol throat lozenges, rest  and increased fluids. Pt was encouraged to seek treatment back in the ER or urgent care for worsening symptoms,  fever greater than 100.5, wheezes or shortness of breath.

## 2025-01-16 ENCOUNTER — HOSPITAL ENCOUNTER (OUTPATIENT)
Dept: LAB | Facility: MEDICAL CENTER | Age: 40
End: 2025-01-16
Payer: COMMERCIAL

## 2025-01-16 LAB
BASOPHILS # BLD AUTO: 0.5 % (ref 0–1.8)
BASOPHILS # BLD: 0.05 K/UL (ref 0–0.12)
EOSINOPHIL # BLD AUTO: 0.12 K/UL (ref 0–0.51)
EOSINOPHIL NFR BLD: 1.3 % (ref 0–6.9)
ERYTHROCYTE [DISTWIDTH] IN BLOOD BY AUTOMATED COUNT: 42.4 FL (ref 35.9–50)
HCT VFR BLD AUTO: 43.6 % (ref 37–47)
HGB BLD-MCNC: 14.6 G/DL (ref 12–16)
IMM GRANULOCYTES # BLD AUTO: 0.01 K/UL (ref 0–0.11)
IMM GRANULOCYTES NFR BLD AUTO: 0.1 % (ref 0–0.9)
LYMPHOCYTES # BLD AUTO: 3.97 K/UL (ref 1–4.8)
LYMPHOCYTES NFR BLD: 41.5 % (ref 22–41)
MCH RBC QN AUTO: 31.6 PG (ref 27–33)
MCHC RBC AUTO-ENTMCNC: 33.5 G/DL (ref 32.2–35.5)
MCV RBC AUTO: 94.4 FL (ref 81.4–97.8)
MONOCYTES # BLD AUTO: 0.81 K/UL (ref 0–0.85)
MONOCYTES NFR BLD AUTO: 8.5 % (ref 0–13.4)
NEUTROPHILS # BLD AUTO: 4.6 K/UL (ref 1.82–7.42)
NEUTROPHILS NFR BLD: 48.1 % (ref 44–72)
NRBC # BLD AUTO: 0 K/UL
NRBC BLD-RTO: 0 /100 WBC (ref 0–0.2)
PLATELET # BLD AUTO: 316 K/UL (ref 164–446)
PMV BLD AUTO: 10.2 FL (ref 9–12.9)
RBC # BLD AUTO: 4.62 M/UL (ref 4.2–5.4)
WBC # BLD AUTO: 9.6 K/UL (ref 4.8–10.8)

## 2025-01-16 PROCEDURE — 84443 ASSAY THYROID STIM HORMONE: CPT

## 2025-01-16 PROCEDURE — 84439 ASSAY OF FREE THYROXINE: CPT

## 2025-01-16 PROCEDURE — 82607 VITAMIN B-12: CPT

## 2025-01-16 PROCEDURE — 85025 COMPLETE CBC W/AUTO DIFF WBC: CPT

## 2025-01-16 PROCEDURE — 80053 COMPREHEN METABOLIC PANEL: CPT

## 2025-01-16 PROCEDURE — 80061 LIPID PANEL: CPT

## 2025-01-16 PROCEDURE — 36415 COLL VENOUS BLD VENIPUNCTURE: CPT

## 2025-01-17 LAB
ALBUMIN SERPL BCP-MCNC: 4.4 G/DL (ref 3.2–4.9)
ALBUMIN/GLOB SERPL: 1.3 G/DL
ALP SERPL-CCNC: 84 U/L (ref 30–99)
ALT SERPL-CCNC: 22 U/L (ref 2–50)
ANION GAP SERPL CALC-SCNC: 11 MMOL/L (ref 7–16)
AST SERPL-CCNC: 19 U/L (ref 12–45)
BILIRUB SERPL-MCNC: 0.9 MG/DL (ref 0.1–1.5)
BUN SERPL-MCNC: 16 MG/DL (ref 8–22)
CALCIUM ALBUM COR SERPL-MCNC: 9.1 MG/DL (ref 8.5–10.5)
CALCIUM SERPL-MCNC: 9.4 MG/DL (ref 8.5–10.5)
CHLORIDE SERPL-SCNC: 105 MMOL/L (ref 96–112)
CHOLEST SERPL-MCNC: 245 MG/DL (ref 100–199)
CO2 SERPL-SCNC: 25 MMOL/L (ref 20–33)
CREAT SERPL-MCNC: 1.03 MG/DL (ref 0.5–1.4)
FASTING STATUS PATIENT QL REPORTED: NORMAL
GFR SERPLBLD CREATININE-BSD FMLA CKD-EPI: 72 ML/MIN/1.73 M 2
GLOBULIN SER CALC-MCNC: 3.3 G/DL (ref 1.9–3.5)
GLUCOSE SERPL-MCNC: 87 MG/DL (ref 65–99)
HDLC SERPL-MCNC: 47 MG/DL
LDLC SERPL CALC-MCNC: 169 MG/DL
POTASSIUM SERPL-SCNC: 4.5 MMOL/L (ref 3.6–5.5)
PROT SERPL-MCNC: 7.7 G/DL (ref 6–8.2)
SODIUM SERPL-SCNC: 141 MMOL/L (ref 135–145)
T4 FREE SERPL-MCNC: 1 NG/DL (ref 0.93–1.7)
TRIGL SERPL-MCNC: 144 MG/DL (ref 0–149)
TSH SERPL DL<=0.005 MIU/L-ACNC: 6.43 UIU/ML (ref 0.38–5.33)
VIT B12 SERPL-MCNC: 903 PG/ML (ref 211–911)

## 2025-07-08 ENCOUNTER — HOSPITAL ENCOUNTER (OUTPATIENT)
Dept: LAB | Facility: MEDICAL CENTER | Age: 40
End: 2025-07-08
Payer: COMMERCIAL

## 2025-07-08 LAB
T3FREE SERPL-MCNC: 2.94 PG/ML (ref 2–4.4)
T4 FREE SERPL-MCNC: 0.93 NG/DL (ref 0.93–1.7)
TSH SERPL-ACNC: 3.99 UIU/ML (ref 0.38–5.33)

## 2025-07-08 PROCEDURE — 84481 FREE ASSAY (FT-3): CPT

## 2025-07-08 PROCEDURE — 84439 ASSAY OF FREE THYROXINE: CPT

## 2025-07-08 PROCEDURE — 36415 COLL VENOUS BLD VENIPUNCTURE: CPT

## 2025-07-08 PROCEDURE — 84443 ASSAY THYROID STIM HORMONE: CPT

## (undated) DEVICE — GLOVE BIOGEL SZ 8 SURGICAL PF LTX - (50PR/BX 4BX/CA)

## (undated) DEVICE — SLEEVE, VASO, THIGH, MED

## (undated) DEVICE — STAPLER SKIN DISP - (6/BX 10BX/CA) VISISTAT

## (undated) DEVICE — PACK C-SECTION (2EA/CA)

## (undated) DEVICE — TRAY BLADDER CARE W/ 16 FR FOLEY CATHETER STATLOCK  (10/CA)

## (undated) DEVICE — SUTURE 1 CHROMIC CTX ETHICON - (36PK/BX)

## (undated) DEVICE — SUTURE 0 COATED VICRYL 6-18IN - (12PK/BX)

## (undated) DEVICE — WATER IRRIG. STER. 1500 ML - (9/CA)

## (undated) DEVICE — SET EXTENSION WITH 2 PORTS (48EA/CA) ***PART #2C8610 IS A SUBSTITUTE*****

## (undated) DEVICE — SET LEADWIRE 5 LEAD BEDSIDE DISPOSABLE ECG (1SET OF 5/EA)

## (undated) DEVICE — GLOVE BIOGEL SZ 7 SURGICAL PF LTX - (50PR/BX 4BX/CA)

## (undated) DEVICE — LACTATED RINGERS INJ 1000 ML - (14EA/CA 60CA/PF)

## (undated) DEVICE — KIT ROOM DECONTAMINATION

## (undated) DEVICE — SUTURE 3-0 VICRYL PLUS CT-1 - 36 INCH (36/BX)

## (undated) DEVICE — GOWN WARMING STANDARD FLEX - (30/CA)

## (undated) DEVICE — BOVIE BLADE COATED &INSULATED - 25/PK

## (undated) DEVICE — ELECTRODE 850 FOAM ADHESIVE - HYDROGEL RADIOTRNSPRNT (50/PK)

## (undated) DEVICE — CLIP LG INTNL HRZN TI ESCP LGT - (20/BX)

## (undated) DEVICE — SUTURE GENERAL

## (undated) DEVICE — PAD LAP STERILE 18 X 18 - (5/PK 40PK/CA)

## (undated) DEVICE — ELECTRODE DUAL RETURN W/ CORD - (50/PK)

## (undated) DEVICE — SENSOR SPO2 NEO LNCS ADHESIVE (20/BX) SEE USER NOTES

## (undated) DEVICE — SUTURE 2-0 CHROMIC GUT CT-1 27 (36PK/BX)"

## (undated) DEVICE — DRAPE LAPAROTOMY T SHEET - (12EA/CA)

## (undated) DEVICE — SUTURE 2-0 VICRYL PLUS CT-1 36 (36PK/BX)"

## (undated) DEVICE — CLIP MED INTNL HRZN TI ESCP - (25/BX)

## (undated) DEVICE — SUCTION INSTRUMENT YANKAUER BULBOUS TIP W/O VENT (50EA/CA)

## (undated) DEVICE — SUTURE 0 VICRYL PLUS CT 36 (36PK/BX)"

## (undated) DEVICE — SODIUM CHL IRRIGATION 0.9% 1000ML (12EA/CA)

## (undated) DEVICE — BLADE SURGICAL #10 - (50/BX)

## (undated) DEVICE — KIT  I.V. START (100EA/CA)

## (undated) DEVICE — TUBING CLEARLINK DUO-VENT - C-FLO (48EA/CA)

## (undated) DEVICE — TAPE CLOTH MEDIPORE 6 INCH - (12RL/CA)

## (undated) DEVICE — TUBE E-T HI-LO CUFF 7.0MM (10EA/PK)

## (undated) DEVICE — KIT ANESTHESIA W/CIRCUIT & 3/LT BAG W/FILTER (20EA/CA)

## (undated) DEVICE — HEAD HOLDER JUNIOR/ADULT

## (undated) DEVICE — CANISTER SUCTION 3000ML MECHANICAL FILTER AUTO SHUTOFF MEDI-VAC NONSTERILE LF DISP  (40EA/CA)

## (undated) DEVICE — TRAY SRGPRP PVP IOD WT PRP - (20/CA)

## (undated) DEVICE — SUTURE 0 VICRYL PLUS CT-1 - 8 X 18 INCH (12/BX)

## (undated) DEVICE — GLOVE BIOGEL INDICATOR SZ 7SURGICAL PF LTX - (50/BX 4BX/CA)

## (undated) DEVICE — CLIP MED LG INTNL HRZN TI ESCP - (20/BX)

## (undated) DEVICE — BLANKET UNDERBODY FULL ACCES - (5/CA)

## (undated) DEVICE — CHLORAPREP 26 ML APPLICATOR - ORANGE TINT(25/CA)

## (undated) DEVICE — SHEATH RO 4F 10CM (10EA/BX)

## (undated) DEVICE — DRESSING INTERCEED ABSORBABLE ADHESION BARRIER TC7 (10EA/CA)

## (undated) DEVICE — CATHETER IV NON-SAFETY 18 GA X 1 1/4 (50/BX 4BX/CA)

## (undated) DEVICE — DETERGENT RENUZYME PLUS 10 OZ PACKET (50/BX)

## (undated) DEVICE — NEPTUNE 4 PORT MANIFOLD - (20/PK)

## (undated) DEVICE — SUTURE 0 VICRYL PLUS CT-1 - 36 INCH (36/BX)

## (undated) DEVICE — PACK MAJOR BASIN - (2EA/CA)

## (undated) DEVICE — SLEEVE, SEQUENTIAL CALF REG

## (undated) DEVICE — GLOVE BIOGEL INDICATOR SZ 7.5 SURGICAL PF LTX - (50PR/BX 4BX/CA)

## (undated) DEVICE — SUTURE 0 PROLENE CT-1 30 (36PK/BX)"

## (undated) DEVICE — SPONGE RADIOPAQUE CTN X-LG - STERILE (50PK/CA) MADE TO ORDER ITEM AND HAS A 4-6 WEEK LEAD TIME

## (undated) DEVICE — TRAY CATHETER FOLEY URINE METER W/STATLOCK 350ML (10EA/CA)

## (undated) DEVICE — BLADE SURGICAL CLIPPER - (50EA/CA)

## (undated) DEVICE — TRAY SPINAL ANESTHESIA NON-SAFETY (10/CA)

## (undated) DEVICE — PROTECTOR ULNA NERVE - (36PR/CA)

## (undated) DEVICE — MASK ANESTHESIA ADULT  - (100/CA)

## (undated) DEVICE — SPONGE GAUZESTER 4 X 4 4PLY - (128PK/CA)

## (undated) DEVICE — SUTURE 3-0 VICRYL PLUS SH - 8X 18 INCH (12/BX)

## (undated) DEVICE — SPONGE PREP 10/PK (CENTRAL)